# Patient Record
Sex: FEMALE | Race: WHITE | NOT HISPANIC OR LATINO | Employment: FULL TIME | URBAN - METROPOLITAN AREA
[De-identification: names, ages, dates, MRNs, and addresses within clinical notes are randomized per-mention and may not be internally consistent; named-entity substitution may affect disease eponyms.]

---

## 2017-06-08 ENCOUNTER — OFFICE VISIT - HEALTHEAST (OUTPATIENT)
Dept: FAMILY MEDICINE | Facility: CLINIC | Age: 52
End: 2017-06-08

## 2017-06-08 DIAGNOSIS — Z12.31 VISIT FOR SCREENING MAMMOGRAM: ICD-10-CM

## 2017-06-08 DIAGNOSIS — Z00.00 ROUTINE GENERAL MEDICAL EXAMINATION AT A HEALTH CARE FACILITY: ICD-10-CM

## 2017-06-08 DIAGNOSIS — M25.562 CHRONIC ARTHRALGIAS OF KNEES AND HIPS: ICD-10-CM

## 2017-06-08 DIAGNOSIS — E11.9 TYPE 2 DIABETES MELLITUS WITHOUT COMPLICATION, WITHOUT LONG-TERM CURRENT USE OF INSULIN (H): ICD-10-CM

## 2017-06-08 DIAGNOSIS — G89.29 CHRONIC ARTHRALGIAS OF KNEES AND HIPS: ICD-10-CM

## 2017-06-08 DIAGNOSIS — M25.552 CHRONIC ARTHRALGIAS OF KNEES AND HIPS: ICD-10-CM

## 2017-06-08 DIAGNOSIS — E78.2 MIXED HYPERLIPIDEMIA: ICD-10-CM

## 2017-06-08 DIAGNOSIS — M25.561 CHRONIC ARTHRALGIAS OF KNEES AND HIPS: ICD-10-CM

## 2017-06-08 DIAGNOSIS — I10 ESSENTIAL HYPERTENSION, BENIGN: ICD-10-CM

## 2017-06-08 DIAGNOSIS — E66.9 OBESITY, UNSPECIFIED: ICD-10-CM

## 2017-06-08 DIAGNOSIS — M25.551 CHRONIC ARTHRALGIAS OF KNEES AND HIPS: ICD-10-CM

## 2017-06-08 DIAGNOSIS — E55.9 VITAMIN D DEFICIENCY: ICD-10-CM

## 2017-06-08 LAB
CHOLEST SERPL-MCNC: 137 MG/DL
FASTING STATUS PATIENT QL REPORTED: YES
HBA1C MFR BLD: 6.9 % (ref 3.5–6)
HDLC SERPL-MCNC: 40 MG/DL
LDLC SERPL CALC-MCNC: 81 MG/DL
TRIGL SERPL-MCNC: 79 MG/DL

## 2017-06-08 ASSESSMENT — MIFFLIN-ST. JEOR: SCORE: 1695.67

## 2017-06-09 ENCOUNTER — COMMUNICATION - HEALTHEAST (OUTPATIENT)
Dept: FAMILY MEDICINE | Facility: CLINIC | Age: 52
End: 2017-06-09

## 2017-06-23 ENCOUNTER — RECORDS - HEALTHEAST (OUTPATIENT)
Dept: ADMINISTRATIVE | Facility: OTHER | Age: 52
End: 2017-06-23

## 2017-06-28 ENCOUNTER — HOSPITAL ENCOUNTER (OUTPATIENT)
Dept: MAMMOGRAPHY | Facility: HOSPITAL | Age: 52
Discharge: HOME | End: 2017-06-28
Attending: FAMILY MEDICINE

## 2017-06-28 DIAGNOSIS — Z00.00 ROUTINE GENERAL MEDICAL EXAMINATION AT A HEALTH CARE FACILITY: ICD-10-CM

## 2017-06-28 DIAGNOSIS — Z12.31 VISIT FOR SCREENING MAMMOGRAM: ICD-10-CM

## 2017-09-13 ENCOUNTER — COMMUNICATION - HEALTHEAST (OUTPATIENT)
Dept: FAMILY MEDICINE | Facility: CLINIC | Age: 52
End: 2017-09-13

## 2017-09-13 ENCOUNTER — OFFICE VISIT - HEALTHEAST (OUTPATIENT)
Dept: FAMILY MEDICINE | Facility: CLINIC | Age: 52
End: 2017-09-13

## 2017-09-13 DIAGNOSIS — E78.2 MIXED HYPERLIPIDEMIA: ICD-10-CM

## 2017-09-13 DIAGNOSIS — R23.2 HOT FLASHES: ICD-10-CM

## 2017-09-13 DIAGNOSIS — I10 ESSENTIAL HYPERTENSION, BENIGN: ICD-10-CM

## 2017-09-13 DIAGNOSIS — M67.921 BICEPS TENDINOPATHY, RIGHT: ICD-10-CM

## 2017-09-13 DIAGNOSIS — E11.9 TYPE 2 DIABETES MELLITUS WITHOUT COMPLICATION, WITHOUT LONG-TERM CURRENT USE OF INSULIN (H): ICD-10-CM

## 2017-09-13 LAB — HBA1C MFR BLD: 7.4 % (ref 3.5–6)

## 2017-09-13 RX ORDER — LORATADINE 10 MG/1
10 TABLET ORAL DAILY
Status: SHIPPED | COMMUNITY
Start: 2017-04-01

## 2017-09-14 ENCOUNTER — COMMUNICATION - HEALTHEAST (OUTPATIENT)
Dept: FAMILY MEDICINE | Facility: CLINIC | Age: 52
End: 2017-09-14

## 2017-09-14 ENCOUNTER — RECORDS - HEALTHEAST (OUTPATIENT)
Dept: ADMINISTRATIVE | Facility: OTHER | Age: 52
End: 2017-09-14

## 2017-10-04 ENCOUNTER — COMMUNICATION - HEALTHEAST (OUTPATIENT)
Dept: FAMILY MEDICINE | Facility: CLINIC | Age: 52
End: 2017-10-04

## 2017-10-05 ENCOUNTER — COMMUNICATION - HEALTHEAST (OUTPATIENT)
Dept: FAMILY MEDICINE | Facility: CLINIC | Age: 52
End: 2017-10-05

## 2017-11-04 ENCOUNTER — COMMUNICATION - HEALTHEAST (OUTPATIENT)
Dept: FAMILY MEDICINE | Facility: CLINIC | Age: 52
End: 2017-11-04

## 2017-11-04 DIAGNOSIS — I10 ESSENTIAL HYPERTENSION, BENIGN: ICD-10-CM

## 2017-11-14 ENCOUNTER — COMMUNICATION - HEALTHEAST (OUTPATIENT)
Dept: FAMILY MEDICINE | Facility: CLINIC | Age: 52
End: 2017-11-14

## 2017-12-06 ENCOUNTER — OFFICE VISIT - HEALTHEAST (OUTPATIENT)
Dept: FAMILY MEDICINE | Facility: CLINIC | Age: 52
End: 2017-12-06

## 2017-12-06 DIAGNOSIS — E78.2 MIXED HYPERLIPIDEMIA: ICD-10-CM

## 2017-12-06 DIAGNOSIS — E11.9 TYPE 2 DIABETES MELLITUS WITHOUT COMPLICATION, WITHOUT LONG-TERM CURRENT USE OF INSULIN (H): ICD-10-CM

## 2017-12-06 DIAGNOSIS — I10 ESSENTIAL HYPERTENSION, BENIGN: ICD-10-CM

## 2017-12-18 ENCOUNTER — COMMUNICATION - HEALTHEAST (OUTPATIENT)
Dept: FAMILY MEDICINE | Facility: CLINIC | Age: 52
End: 2017-12-18

## 2017-12-18 DIAGNOSIS — K21.9 GERD (GASTROESOPHAGEAL REFLUX DISEASE): ICD-10-CM

## 2018-02-05 ENCOUNTER — COMMUNICATION - HEALTHEAST (OUTPATIENT)
Dept: FAMILY MEDICINE | Facility: CLINIC | Age: 53
End: 2018-02-05

## 2018-02-05 DIAGNOSIS — I10 ESSENTIAL HYPERTENSION, BENIGN: ICD-10-CM

## 2018-03-06 ENCOUNTER — OFFICE VISIT - HEALTHEAST (OUTPATIENT)
Dept: FAMILY MEDICINE | Facility: CLINIC | Age: 53
End: 2018-03-06

## 2018-03-06 DIAGNOSIS — E11.9 TYPE 2 DIABETES MELLITUS WITHOUT COMPLICATION, WITHOUT LONG-TERM CURRENT USE OF INSULIN (H): ICD-10-CM

## 2018-03-06 DIAGNOSIS — E78.2 MIXED HYPERLIPIDEMIA: ICD-10-CM

## 2018-03-06 DIAGNOSIS — I10 ESSENTIAL HYPERTENSION, BENIGN: ICD-10-CM

## 2018-03-06 LAB
ANION GAP SERPL CALCULATED.3IONS-SCNC: 9 MMOL/L (ref 5–18)
BUN SERPL-MCNC: 15 MG/DL (ref 8–22)
CALCIUM SERPL-MCNC: 9.8 MG/DL (ref 8.5–10.5)
CHLORIDE BLD-SCNC: 105 MMOL/L (ref 98–107)
CO2 SERPL-SCNC: 29 MMOL/L (ref 22–31)
CREAT SERPL-MCNC: 0.77 MG/DL (ref 0.6–1.1)
GFR SERPL CREATININE-BSD FRML MDRD: >60 ML/MIN/1.73M2
GLUCOSE BLD-MCNC: 104 MG/DL (ref 70–125)
HBA1C MFR BLD: 6.9 % (ref 3.5–6)
POTASSIUM BLD-SCNC: 4.3 MMOL/L (ref 3.5–5)
SODIUM SERPL-SCNC: 143 MMOL/L (ref 136–145)

## 2018-04-30 ENCOUNTER — COMMUNICATION - HEALTHEAST (OUTPATIENT)
Dept: FAMILY MEDICINE | Facility: CLINIC | Age: 53
End: 2018-04-30

## 2018-04-30 DIAGNOSIS — E11.9 TYPE 2 DIABETES MELLITUS WITHOUT COMPLICATION, WITHOUT LONG-TERM CURRENT USE OF INSULIN (H): ICD-10-CM

## 2018-04-30 DIAGNOSIS — L30.9 DERMATITIS: ICD-10-CM

## 2018-05-24 ENCOUNTER — RECORDS - HEALTHEAST (OUTPATIENT)
Dept: ADMINISTRATIVE | Facility: OTHER | Age: 53
End: 2018-05-24

## 2018-06-11 ENCOUNTER — OFFICE VISIT - HEALTHEAST (OUTPATIENT)
Dept: FAMILY MEDICINE | Facility: CLINIC | Age: 53
End: 2018-06-11

## 2018-06-11 DIAGNOSIS — E11.9 TYPE 2 DIABETES MELLITUS WITHOUT COMPLICATION, WITHOUT LONG-TERM CURRENT USE OF INSULIN (H): ICD-10-CM

## 2018-06-11 DIAGNOSIS — R06.83 SNORING: ICD-10-CM

## 2018-06-11 DIAGNOSIS — E66.9 OBESITY: ICD-10-CM

## 2018-06-11 DIAGNOSIS — E78.2 MIXED HYPERLIPIDEMIA: ICD-10-CM

## 2018-06-11 DIAGNOSIS — Z00.00 ROUTINE GENERAL MEDICAL EXAMINATION AT A HEALTH CARE FACILITY: ICD-10-CM

## 2018-06-11 DIAGNOSIS — I10 ESSENTIAL HYPERTENSION, BENIGN: ICD-10-CM

## 2018-06-11 LAB
ALBUMIN SERPL-MCNC: 3.7 G/DL (ref 3.5–5)
ALP SERPL-CCNC: 79 U/L (ref 45–120)
ALT SERPL W P-5'-P-CCNC: 21 U/L (ref 0–45)
ANION GAP SERPL CALCULATED.3IONS-SCNC: 8 MMOL/L (ref 5–18)
AST SERPL W P-5'-P-CCNC: 17 U/L (ref 0–40)
BILIRUB SERPL-MCNC: 0.5 MG/DL (ref 0–1)
BUN SERPL-MCNC: 17 MG/DL (ref 8–22)
CALCIUM SERPL-MCNC: 9.5 MG/DL (ref 8.5–10.5)
CHLORIDE BLD-SCNC: 106 MMOL/L (ref 98–107)
CHOLEST SERPL-MCNC: 165 MG/DL
CO2 SERPL-SCNC: 27 MMOL/L (ref 22–31)
CREAT SERPL-MCNC: 0.79 MG/DL (ref 0.6–1.1)
FASTING STATUS PATIENT QL REPORTED: YES
GFR SERPL CREATININE-BSD FRML MDRD: >60 ML/MIN/1.73M2
GLUCOSE BLD-MCNC: 114 MG/DL (ref 70–125)
HBA1C MFR BLD: 6.3 % (ref 3.5–6)
HDLC SERPL-MCNC: 40 MG/DL
LDLC SERPL CALC-MCNC: 109 MG/DL
POTASSIUM BLD-SCNC: 4.2 MMOL/L (ref 3.5–5)
PROT SERPL-MCNC: 6.7 G/DL (ref 6–8)
SODIUM SERPL-SCNC: 141 MMOL/L (ref 136–145)
TRIGL SERPL-MCNC: 78 MG/DL

## 2018-06-11 ASSESSMENT — MIFFLIN-ST. JEOR: SCORE: 1690.38

## 2018-06-12 LAB
HBV SURFACE AB SERPL IA-ACNC: NEGATIVE M[IU]/ML
HBV SURFACE AG SERPL QL IA: NEGATIVE
HCV AB SERPL QL IA: NEGATIVE

## 2018-06-15 ENCOUNTER — AMBULATORY - HEALTHEAST (OUTPATIENT)
Dept: FAMILY MEDICINE | Facility: CLINIC | Age: 53
End: 2018-06-15

## 2018-06-15 ENCOUNTER — COMMUNICATION - HEALTHEAST (OUTPATIENT)
Dept: FAMILY MEDICINE | Facility: CLINIC | Age: 53
End: 2018-06-15

## 2018-07-02 ENCOUNTER — HOSPITAL ENCOUNTER (OUTPATIENT)
Dept: MAMMOGRAPHY | Facility: CLINIC | Age: 53
Discharge: HOME | End: 2018-07-02
Attending: FAMILY MEDICINE

## 2018-07-02 DIAGNOSIS — Z12.31 VISIT FOR SCREENING MAMMOGRAM: ICD-10-CM

## 2018-07-24 ENCOUNTER — OFFICE VISIT - HEALTHEAST (OUTPATIENT)
Dept: SLEEP MEDICINE | Facility: CLINIC | Age: 53
End: 2018-07-24

## 2018-07-24 DIAGNOSIS — G47.10 HYPERSOMNIA: ICD-10-CM

## 2018-07-24 DIAGNOSIS — G47.8 SLEEP DYSFUNCTION WITH SLEEP STAGE DISTURBANCE: ICD-10-CM

## 2018-07-24 DIAGNOSIS — R03.0 ELEVATED BLOOD PRESSURE READING: ICD-10-CM

## 2018-07-24 DIAGNOSIS — R06.83 SNORING: ICD-10-CM

## 2018-07-24 ASSESSMENT — MIFFLIN-ST. JEOR: SCORE: 1706.26

## 2018-08-05 ENCOUNTER — COMMUNICATION - HEALTHEAST (OUTPATIENT)
Dept: FAMILY MEDICINE | Facility: CLINIC | Age: 53
End: 2018-08-05

## 2018-08-05 DIAGNOSIS — E11.9 TYPE 2 DIABETES MELLITUS WITHOUT COMPLICATION, WITHOUT LONG-TERM CURRENT USE OF INSULIN (H): ICD-10-CM

## 2018-08-06 ENCOUNTER — COMMUNICATION - HEALTHEAST (OUTPATIENT)
Dept: FAMILY MEDICINE | Facility: CLINIC | Age: 53
End: 2018-08-06

## 2018-08-17 ENCOUNTER — COMMUNICATION - HEALTHEAST (OUTPATIENT)
Dept: SLEEP MEDICINE | Facility: CLINIC | Age: 53
End: 2018-08-17

## 2018-08-17 DIAGNOSIS — R06.83 SNORING: ICD-10-CM

## 2018-08-17 DIAGNOSIS — G47.10 HYPERSOMNIA: ICD-10-CM

## 2018-09-15 ENCOUNTER — COMMUNICATION - HEALTHEAST (OUTPATIENT)
Dept: FAMILY MEDICINE | Facility: CLINIC | Age: 53
End: 2018-09-15

## 2018-09-15 DIAGNOSIS — E78.2 MIXED HYPERLIPIDEMIA: ICD-10-CM

## 2018-09-15 DIAGNOSIS — E11.9 TYPE 2 DIABETES MELLITUS WITHOUT COMPLICATION, WITHOUT LONG-TERM CURRENT USE OF INSULIN (H): ICD-10-CM

## 2018-10-02 ENCOUNTER — RECORDS - HEALTHEAST (OUTPATIENT)
Dept: ADMINISTRATIVE | Facility: OTHER | Age: 53
End: 2018-10-02

## 2018-10-02 ENCOUNTER — RECORDS - HEALTHEAST (OUTPATIENT)
Dept: SLEEP MEDICINE | Facility: CLINIC | Age: 53
End: 2018-10-02

## 2018-10-02 DIAGNOSIS — G47.10 HYPERSOMNIA, UNSPECIFIED: ICD-10-CM

## 2018-10-02 DIAGNOSIS — R06.83 SNORING: ICD-10-CM

## 2018-10-05 ENCOUNTER — COMMUNICATION - HEALTHEAST (OUTPATIENT)
Dept: SLEEP MEDICINE | Facility: CLINIC | Age: 53
End: 2018-10-05

## 2018-10-05 DIAGNOSIS — G47.33 OSA (OBSTRUCTIVE SLEEP APNEA): ICD-10-CM

## 2018-10-08 ENCOUNTER — COMMUNICATION - HEALTHEAST (OUTPATIENT)
Dept: SLEEP MEDICINE | Facility: CLINIC | Age: 53
End: 2018-10-08

## 2018-10-08 ENCOUNTER — AMBULATORY - HEALTHEAST (OUTPATIENT)
Dept: SLEEP MEDICINE | Facility: CLINIC | Age: 53
End: 2018-10-08

## 2018-10-09 ENCOUNTER — AMBULATORY - HEALTHEAST (OUTPATIENT)
Dept: SLEEP MEDICINE | Facility: CLINIC | Age: 53
End: 2018-10-09

## 2018-10-11 ENCOUNTER — OFFICE VISIT - HEALTHEAST (OUTPATIENT)
Dept: FAMILY MEDICINE | Facility: CLINIC | Age: 53
End: 2018-10-11

## 2018-10-11 DIAGNOSIS — E66.01 MORBID OBESITY (H): ICD-10-CM

## 2018-10-11 DIAGNOSIS — I10 ESSENTIAL HYPERTENSION, BENIGN: ICD-10-CM

## 2018-10-11 DIAGNOSIS — E11.9 TYPE 2 DIABETES MELLITUS WITHOUT COMPLICATION, WITHOUT LONG-TERM CURRENT USE OF INSULIN (H): ICD-10-CM

## 2018-10-11 DIAGNOSIS — G47.33 OSA (OBSTRUCTIVE SLEEP APNEA): ICD-10-CM

## 2018-10-11 DIAGNOSIS — Z71.85 VACCINE COUNSELING: ICD-10-CM

## 2018-10-11 LAB
ANION GAP SERPL CALCULATED.3IONS-SCNC: 11 MMOL/L (ref 5–18)
BUN SERPL-MCNC: 12 MG/DL (ref 8–22)
CALCIUM SERPL-MCNC: 9.7 MG/DL (ref 8.5–10.5)
CHLORIDE BLD-SCNC: 105 MMOL/L (ref 98–107)
CO2 SERPL-SCNC: 27 MMOL/L (ref 22–31)
CREAT SERPL-MCNC: 0.79 MG/DL (ref 0.6–1.1)
CREAT UR-MCNC: 89.5 MG/DL
GFR SERPL CREATININE-BSD FRML MDRD: >60 ML/MIN/1.73M2
GLUCOSE BLD-MCNC: 108 MG/DL (ref 70–125)
HBA1C MFR BLD: 7.1 % (ref 3.5–6)
MICROALBUMIN UR-MCNC: 0.5 MG/DL (ref 0–1.99)
MICROALBUMIN/CREAT UR: 5.6 MG/G
POTASSIUM BLD-SCNC: 4.3 MMOL/L (ref 3.5–5)
SODIUM SERPL-SCNC: 143 MMOL/L (ref 136–145)

## 2018-10-19 ENCOUNTER — AMBULATORY - HEALTHEAST (OUTPATIENT)
Dept: SLEEP MEDICINE | Facility: CLINIC | Age: 53
End: 2018-10-19

## 2018-11-14 ENCOUNTER — COMMUNICATION - HEALTHEAST (OUTPATIENT)
Dept: FAMILY MEDICINE | Facility: CLINIC | Age: 53
End: 2018-11-14

## 2018-12-03 ENCOUNTER — COMMUNICATION - HEALTHEAST (OUTPATIENT)
Dept: FAMILY MEDICINE | Facility: CLINIC | Age: 53
End: 2018-12-03

## 2018-12-03 ENCOUNTER — OFFICE VISIT - HEALTHEAST (OUTPATIENT)
Dept: SLEEP MEDICINE | Facility: CLINIC | Age: 53
End: 2018-12-03

## 2018-12-03 DIAGNOSIS — L30.9 DERMATITIS: ICD-10-CM

## 2018-12-03 DIAGNOSIS — G47.8 SLEEP DYSFUNCTION WITH SLEEP STAGE DISTURBANCE: ICD-10-CM

## 2018-12-03 DIAGNOSIS — G47.33 OSA ON CPAP: ICD-10-CM

## 2018-12-17 ENCOUNTER — COMMUNICATION - HEALTHEAST (OUTPATIENT)
Dept: FAMILY MEDICINE | Facility: CLINIC | Age: 53
End: 2018-12-17

## 2018-12-17 DIAGNOSIS — K21.9 GERD (GASTROESOPHAGEAL REFLUX DISEASE): ICD-10-CM

## 2019-01-11 ENCOUNTER — OFFICE VISIT - HEALTHEAST (OUTPATIENT)
Dept: FAMILY MEDICINE | Facility: CLINIC | Age: 54
End: 2019-01-11

## 2019-01-11 ENCOUNTER — COMMUNICATION - HEALTHEAST (OUTPATIENT)
Dept: FAMILY MEDICINE | Facility: CLINIC | Age: 54
End: 2019-01-11

## 2019-01-11 ENCOUNTER — RECORDS - HEALTHEAST (OUTPATIENT)
Dept: ADMINISTRATIVE | Facility: OTHER | Age: 54
End: 2019-01-11

## 2019-01-11 DIAGNOSIS — I10 ESSENTIAL HYPERTENSION, BENIGN: ICD-10-CM

## 2019-01-11 DIAGNOSIS — E11.9 TYPE 2 DIABETES MELLITUS WITHOUT COMPLICATION, WITHOUT LONG-TERM CURRENT USE OF INSULIN (H): ICD-10-CM

## 2019-01-11 DIAGNOSIS — Z71.85 VACCINE COUNSELING: ICD-10-CM

## 2019-01-11 LAB
ANION GAP SERPL CALCULATED.3IONS-SCNC: 7 MMOL/L (ref 5–18)
BUN SERPL-MCNC: 16 MG/DL (ref 8–22)
CALCIUM SERPL-MCNC: 9.6 MG/DL (ref 8.5–10.5)
CHLORIDE BLD-SCNC: 107 MMOL/L (ref 98–107)
CO2 SERPL-SCNC: 28 MMOL/L (ref 22–31)
CREAT SERPL-MCNC: 0.78 MG/DL (ref 0.6–1.1)
GFR SERPL CREATININE-BSD FRML MDRD: >60 ML/MIN/1.73M2
GLUCOSE BLD-MCNC: 108 MG/DL (ref 70–125)
HBA1C MFR BLD: 6.6 % (ref 3.5–6)
POTASSIUM BLD-SCNC: 4.4 MMOL/L (ref 3.5–5)
RETINOPATHY: NEGATIVE
SODIUM SERPL-SCNC: 142 MMOL/L (ref 136–145)

## 2019-05-06 ENCOUNTER — OFFICE VISIT - HEALTHEAST (OUTPATIENT)
Dept: FAMILY MEDICINE | Facility: CLINIC | Age: 54
End: 2019-05-06

## 2019-05-06 DIAGNOSIS — Z82.49 FAMILY HISTORY OF ISCHEMIC HEART DISEASE: ICD-10-CM

## 2019-05-06 DIAGNOSIS — I10 ESSENTIAL HYPERTENSION, BENIGN: ICD-10-CM

## 2019-05-06 DIAGNOSIS — E66.01 MORBID OBESITY (H): ICD-10-CM

## 2019-05-06 DIAGNOSIS — Z00.00 ROUTINE GENERAL MEDICAL EXAMINATION AT A HEALTH CARE FACILITY: ICD-10-CM

## 2019-05-06 DIAGNOSIS — E11.9 TYPE 2 DIABETES MELLITUS WITHOUT COMPLICATION, WITHOUT LONG-TERM CURRENT USE OF INSULIN (H): ICD-10-CM

## 2019-05-06 LAB
ALBUMIN SERPL-MCNC: 4.2 G/DL (ref 3.5–5)
ALP SERPL-CCNC: 80 U/L (ref 45–120)
ALT SERPL W P-5'-P-CCNC: 21 U/L (ref 0–45)
ANION GAP SERPL CALCULATED.3IONS-SCNC: 12 MMOL/L (ref 5–18)
AST SERPL W P-5'-P-CCNC: 16 U/L (ref 0–40)
BILIRUB SERPL-MCNC: 0.7 MG/DL (ref 0–1)
BUN SERPL-MCNC: 15 MG/DL (ref 8–22)
CALCIUM SERPL-MCNC: 10.2 MG/DL (ref 8.5–10.5)
CHLORIDE BLD-SCNC: 105 MMOL/L (ref 98–107)
CHOLEST SERPL-MCNC: 167 MG/DL
CO2 SERPL-SCNC: 26 MMOL/L (ref 22–31)
CREAT SERPL-MCNC: 0.77 MG/DL (ref 0.6–1.1)
FASTING STATUS PATIENT QL REPORTED: YES
GFR SERPL CREATININE-BSD FRML MDRD: >60 ML/MIN/1.73M2
GLUCOSE BLD-MCNC: 99 MG/DL (ref 70–125)
HBA1C MFR BLD: 6 % (ref 3.5–6)
HDLC SERPL-MCNC: 43 MG/DL
LDLC SERPL CALC-MCNC: 104 MG/DL
POTASSIUM BLD-SCNC: 4 MMOL/L (ref 3.5–5)
PROT SERPL-MCNC: 7.3 G/DL (ref 6–8)
SODIUM SERPL-SCNC: 143 MMOL/L (ref 136–145)
TRIGL SERPL-MCNC: 101 MG/DL

## 2019-05-06 ASSESSMENT — MIFFLIN-ST. JEOR: SCORE: 1609.27

## 2019-05-14 ENCOUNTER — COMMUNICATION - HEALTHEAST (OUTPATIENT)
Dept: FAMILY MEDICINE | Facility: CLINIC | Age: 54
End: 2019-05-14

## 2019-06-07 ENCOUNTER — COMMUNICATION - HEALTHEAST (OUTPATIENT)
Dept: FAMILY MEDICINE | Facility: CLINIC | Age: 54
End: 2019-06-07

## 2019-06-07 DIAGNOSIS — I10 ESSENTIAL HYPERTENSION, BENIGN: ICD-10-CM

## 2019-06-28 ENCOUNTER — OFFICE VISIT - HEALTHEAST (OUTPATIENT)
Dept: CARDIOLOGY | Facility: CLINIC | Age: 54
End: 2019-06-28

## 2019-06-28 DIAGNOSIS — E66.01 MORBID OBESITY (H): ICD-10-CM

## 2019-06-28 DIAGNOSIS — Z82.49 FAMILY HISTORY OF ATHEROSCLEROSIS: ICD-10-CM

## 2019-06-28 DIAGNOSIS — E11.9 TYPE 2 DIABETES MELLITUS WITHOUT COMPLICATION, WITHOUT LONG-TERM CURRENT USE OF INSULIN (H): ICD-10-CM

## 2019-06-28 DIAGNOSIS — I10 ESSENTIAL HYPERTENSION, BENIGN: ICD-10-CM

## 2019-06-28 DIAGNOSIS — G47.33 OSA (OBSTRUCTIVE SLEEP APNEA): ICD-10-CM

## 2019-06-28 DIAGNOSIS — E78.2 MIXED HYPERLIPIDEMIA: ICD-10-CM

## 2019-06-28 ASSESSMENT — MIFFLIN-ST. JEOR: SCORE: 1626.88

## 2019-07-02 ENCOUNTER — AMBULATORY - HEALTHEAST (OUTPATIENT)
Dept: CARDIOLOGY | Facility: CLINIC | Age: 54
End: 2019-07-02

## 2019-07-02 DIAGNOSIS — I10 ESSENTIAL HYPERTENSION, BENIGN: ICD-10-CM

## 2019-07-02 DIAGNOSIS — E11.9 TYPE 2 DIABETES MELLITUS WITHOUT COMPLICATION, WITHOUT LONG-TERM CURRENT USE OF INSULIN (H): ICD-10-CM

## 2019-07-02 DIAGNOSIS — E78.2 MIXED HYPERLIPIDEMIA: ICD-10-CM

## 2019-07-12 ENCOUNTER — HOSPITAL ENCOUNTER (OUTPATIENT)
Dept: CT IMAGING | Facility: CLINIC | Age: 54
Discharge: HOME | End: 2019-07-12
Attending: INTERNAL MEDICINE

## 2019-07-12 DIAGNOSIS — E11.9 TYPE 2 DIABETES MELLITUS WITHOUT COMPLICATION, WITHOUT LONG-TERM CURRENT USE OF INSULIN (H): ICD-10-CM

## 2019-07-12 DIAGNOSIS — E78.2 MIXED HYPERLIPIDEMIA: ICD-10-CM

## 2019-07-12 DIAGNOSIS — I10 ESSENTIAL HYPERTENSION, BENIGN: ICD-10-CM

## 2019-07-12 LAB
CV CALCIUM SCORE AGATSTON LM: 0
CV CALCIUM SCORING AGATSON LAD: 13
CV CALCIUM SCORING AGATSTON CX: 0
CV CALCIUM SCORING AGATSTON RCA: 0
CV CALCIUM SCORING AGATSTON TOTAL: 13

## 2019-08-07 ENCOUNTER — HOSPITAL ENCOUNTER (OUTPATIENT)
Dept: MAMMOGRAPHY | Facility: CLINIC | Age: 54
Discharge: HOME | End: 2019-08-07
Attending: FAMILY MEDICINE

## 2019-08-07 DIAGNOSIS — Z12.31 VISIT FOR SCREENING MAMMOGRAM: ICD-10-CM

## 2019-08-15 ENCOUNTER — COMMUNICATION - HEALTHEAST (OUTPATIENT)
Dept: FAMILY MEDICINE | Facility: CLINIC | Age: 54
End: 2019-08-15

## 2019-08-15 DIAGNOSIS — E11.9 TYPE 2 DIABETES MELLITUS WITHOUT COMPLICATION, WITHOUT LONG-TERM CURRENT USE OF INSULIN (H): ICD-10-CM

## 2019-09-12 ENCOUNTER — COMMUNICATION - HEALTHEAST (OUTPATIENT)
Dept: FAMILY MEDICINE | Facility: CLINIC | Age: 54
End: 2019-09-12

## 2019-09-12 DIAGNOSIS — I10 ESSENTIAL HYPERTENSION, BENIGN: ICD-10-CM

## 2019-09-18 ENCOUNTER — COMMUNICATION - HEALTHEAST (OUTPATIENT)
Dept: FAMILY MEDICINE | Facility: CLINIC | Age: 54
End: 2019-09-18

## 2019-09-18 DIAGNOSIS — K21.9 GERD (GASTROESOPHAGEAL REFLUX DISEASE): ICD-10-CM

## 2019-09-18 DIAGNOSIS — E78.2 MIXED HYPERLIPIDEMIA: ICD-10-CM

## 2019-09-18 DIAGNOSIS — E11.9 TYPE 2 DIABETES MELLITUS WITHOUT COMPLICATION, WITHOUT LONG-TERM CURRENT USE OF INSULIN (H): ICD-10-CM

## 2019-10-10 ENCOUNTER — RECORDS - HEALTHEAST (OUTPATIENT)
Dept: ADMINISTRATIVE | Facility: OTHER | Age: 54
End: 2019-10-10

## 2019-10-17 RX ORDER — ACETAMINOPHEN 500 MG
TABLET ORAL
Status: SHIPPED | COMMUNITY
Start: 2019-10-11

## 2019-10-21 ENCOUNTER — OFFICE VISIT - HEALTHEAST (OUTPATIENT)
Dept: FAMILY MEDICINE | Facility: CLINIC | Age: 54
End: 2019-10-21

## 2019-10-21 DIAGNOSIS — I10 ESSENTIAL HYPERTENSION, BENIGN: ICD-10-CM

## 2019-10-21 DIAGNOSIS — Z71.85 VACCINE COUNSELING: ICD-10-CM

## 2019-10-21 DIAGNOSIS — Z23 NEED FOR INFLUENZA VACCINATION: ICD-10-CM

## 2019-10-21 DIAGNOSIS — M54.50 ACUTE LEFT-SIDED LOW BACK PAIN WITHOUT SCIATICA: ICD-10-CM

## 2019-10-21 DIAGNOSIS — E11.9 TYPE 2 DIABETES MELLITUS WITHOUT COMPLICATION, WITHOUT LONG-TERM CURRENT USE OF INSULIN (H): ICD-10-CM

## 2019-10-21 LAB
CREAT UR-MCNC: 82.7 MG/DL
HBA1C MFR BLD: 6.1 % (ref 3.5–6)
MICROALBUMIN UR-MCNC: <0.5 MG/DL (ref 0–1.99)
MICROALBUMIN/CREAT UR: NORMAL MG/G{CREAT}

## 2019-10-30 ENCOUNTER — COMMUNICATION - HEALTHEAST (OUTPATIENT)
Dept: FAMILY MEDICINE | Facility: CLINIC | Age: 54
End: 2019-10-30

## 2019-10-30 ENCOUNTER — COMMUNICATION - HEALTHEAST (OUTPATIENT)
Dept: SCHEDULING | Facility: CLINIC | Age: 54
End: 2019-10-30

## 2019-10-30 DIAGNOSIS — D22.9 CHANGE IN MOLE: ICD-10-CM

## 2019-11-07 ENCOUNTER — COMMUNICATION - HEALTHEAST (OUTPATIENT)
Dept: FAMILY MEDICINE | Facility: CLINIC | Age: 54
End: 2019-11-07

## 2019-11-12 ENCOUNTER — RECORDS - HEALTHEAST (OUTPATIENT)
Dept: ADMINISTRATIVE | Facility: OTHER | Age: 54
End: 2019-11-12

## 2019-12-11 ENCOUNTER — AMBULATORY - HEALTHEAST (OUTPATIENT)
Dept: FAMILY MEDICINE | Facility: CLINIC | Age: 54
End: 2019-12-11

## 2019-12-20 ENCOUNTER — AMBULATORY - HEALTHEAST (OUTPATIENT)
Dept: NURSING | Facility: CLINIC | Age: 54
End: 2019-12-20

## 2019-12-20 DIAGNOSIS — Z23 NEED FOR VACCINATION: ICD-10-CM

## 2020-01-07 ENCOUNTER — OFFICE VISIT - HEALTHEAST (OUTPATIENT)
Dept: FAMILY MEDICINE | Facility: CLINIC | Age: 55
End: 2020-01-07

## 2020-01-07 DIAGNOSIS — H66.003 NON-RECURRENT ACUTE SUPPURATIVE OTITIS MEDIA OF BOTH EARS WITHOUT SPONTANEOUS RUPTURE OF TYMPANIC MEMBRANES: ICD-10-CM

## 2020-01-07 DIAGNOSIS — H60.502 ACUTE OTITIS EXTERNA OF LEFT EAR, UNSPECIFIED TYPE: ICD-10-CM

## 2020-01-07 DIAGNOSIS — J02.0 STREPTOCOCCAL PHARYNGITIS: ICD-10-CM

## 2020-03-12 ENCOUNTER — COMMUNICATION - HEALTHEAST (OUTPATIENT)
Dept: FAMILY MEDICINE | Facility: CLINIC | Age: 55
End: 2020-03-12

## 2020-03-12 DIAGNOSIS — L30.9 DERMATITIS: ICD-10-CM

## 2020-03-15 RX ORDER — TRIAMCINOLONE ACETONIDE 5 MG/G
CREAM TOPICAL
Qty: 30 G | Refills: 6 | Status: SHIPPED | OUTPATIENT
Start: 2020-03-15 | End: 2022-01-24

## 2020-04-06 ENCOUNTER — AMBULATORY - HEALTHEAST (OUTPATIENT)
Dept: FAMILY MEDICINE | Facility: CLINIC | Age: 55
End: 2020-04-06

## 2020-04-07 ENCOUNTER — RECORDS - HEALTHEAST (OUTPATIENT)
Dept: HEALTH INFORMATION MANAGEMENT | Facility: CLINIC | Age: 55
End: 2020-04-07

## 2020-06-08 ENCOUNTER — COMMUNICATION - HEALTHEAST (OUTPATIENT)
Dept: FAMILY MEDICINE | Facility: CLINIC | Age: 55
End: 2020-06-08

## 2020-06-08 DIAGNOSIS — I10 ESSENTIAL HYPERTENSION, BENIGN: ICD-10-CM

## 2020-06-08 RX ORDER — AMLODIPINE BESYLATE 10 MG/1
TABLET ORAL
Qty: 90 TABLET | Refills: 3 | Status: SHIPPED | OUTPATIENT
Start: 2020-06-08 | End: 2021-12-21

## 2020-06-22 ENCOUNTER — COMMUNICATION - HEALTHEAST (OUTPATIENT)
Dept: FAMILY MEDICINE | Facility: CLINIC | Age: 55
End: 2020-06-22

## 2020-06-22 DIAGNOSIS — I10 ESSENTIAL HYPERTENSION, BENIGN: ICD-10-CM

## 2020-06-29 ENCOUNTER — COMMUNICATION - HEALTHEAST (OUTPATIENT)
Dept: FAMILY MEDICINE | Facility: CLINIC | Age: 55
End: 2020-06-29

## 2020-06-29 DIAGNOSIS — E78.2 MIXED HYPERLIPIDEMIA: ICD-10-CM

## 2020-06-29 DIAGNOSIS — E11.9 TYPE 2 DIABETES MELLITUS WITHOUT COMPLICATION, WITHOUT LONG-TERM CURRENT USE OF INSULIN (H): ICD-10-CM

## 2020-06-29 DIAGNOSIS — K21.9 GERD (GASTROESOPHAGEAL REFLUX DISEASE): ICD-10-CM

## 2020-08-17 ENCOUNTER — HOSPITAL ENCOUNTER (OUTPATIENT)
Dept: MAMMOGRAPHY | Facility: CLINIC | Age: 55
Discharge: HOME | End: 2020-08-17
Attending: FAMILY MEDICINE

## 2020-08-17 DIAGNOSIS — Z12.31 SCREENING MAMMOGRAM, ENCOUNTER FOR: ICD-10-CM

## 2020-09-03 ENCOUNTER — OFFICE VISIT - HEALTHEAST (OUTPATIENT)
Dept: FAMILY MEDICINE | Facility: CLINIC | Age: 55
End: 2020-09-03

## 2020-09-03 DIAGNOSIS — E66.01 MORBID OBESITY (H): ICD-10-CM

## 2020-09-03 DIAGNOSIS — E11.9 TYPE 2 DIABETES MELLITUS WITHOUT COMPLICATION, WITHOUT LONG-TERM CURRENT USE OF INSULIN (H): ICD-10-CM

## 2020-09-03 DIAGNOSIS — R12 HEARTBURN: ICD-10-CM

## 2020-09-03 DIAGNOSIS — Z00.00 ROUTINE GENERAL MEDICAL EXAMINATION AT A HEALTH CARE FACILITY: ICD-10-CM

## 2020-09-03 DIAGNOSIS — I10 ESSENTIAL HYPERTENSION, BENIGN: ICD-10-CM

## 2020-09-03 DIAGNOSIS — E78.2 MIXED HYPERLIPIDEMIA: ICD-10-CM

## 2020-09-03 LAB
ALBUMIN SERPL-MCNC: 3.8 G/DL (ref 3.5–5)
ALBUMIN UR-MCNC: NEGATIVE MG/DL
ALP SERPL-CCNC: 81 U/L (ref 45–120)
ALT SERPL W P-5'-P-CCNC: 21 U/L (ref 0–45)
ANION GAP SERPL CALCULATED.3IONS-SCNC: 9 MMOL/L (ref 5–18)
APPEARANCE UR: CLEAR
AST SERPL W P-5'-P-CCNC: 17 U/L (ref 0–40)
BACTERIA #/AREA URNS HPF: ABNORMAL HPF
BILIRUB SERPL-MCNC: 0.5 MG/DL (ref 0–1)
BILIRUB UR QL STRIP: NEGATIVE
BUN SERPL-MCNC: 14 MG/DL (ref 8–22)
CALCIUM SERPL-MCNC: 9.4 MG/DL (ref 8.5–10.5)
CHLORIDE BLD-SCNC: 107 MMOL/L (ref 98–107)
CHOLEST SERPL-MCNC: 171 MG/DL
CO2 SERPL-SCNC: 27 MMOL/L (ref 22–31)
COLOR UR AUTO: YELLOW
CREAT SERPL-MCNC: 0.75 MG/DL (ref 0.6–1.1)
CREAT UR-MCNC: 74.7 MG/DL
ERYTHROCYTE [DISTWIDTH] IN BLOOD BY AUTOMATED COUNT: 13.2 % (ref 11–14.5)
FASTING STATUS PATIENT QL REPORTED: YES
GFR SERPL CREATININE-BSD FRML MDRD: >60 ML/MIN/1.73M2
GLUCOSE BLD-MCNC: 117 MG/DL (ref 70–125)
GLUCOSE UR STRIP-MCNC: NEGATIVE MG/DL
HBA1C MFR BLD: 6.5 %
HCT VFR BLD AUTO: 40 % (ref 35–47)
HDLC SERPL-MCNC: 40 MG/DL
HGB BLD-MCNC: 13.1 G/DL (ref 12–16)
HGB UR QL STRIP: NEGATIVE
KETONES UR STRIP-MCNC: NEGATIVE MG/DL
LDLC SERPL CALC-MCNC: 113 MG/DL
LEUKOCYTE ESTERASE UR QL STRIP: ABNORMAL
MCH RBC QN AUTO: 29.8 PG (ref 27–34)
MCHC RBC AUTO-ENTMCNC: 32.8 G/DL (ref 32–36)
MCV RBC AUTO: 91 FL (ref 80–100)
MICROALBUMIN UR-MCNC: 0.51 MG/DL (ref 0–1.99)
MICROALBUMIN/CREAT UR: 6.8 MG/G
NITRATE UR QL: NEGATIVE
PH UR STRIP: 5.5 [PH] (ref 5–8)
PLATELET # BLD AUTO: 226 THOU/UL (ref 140–440)
PMV BLD AUTO: 13.6 FL (ref 8.5–12.5)
POTASSIUM BLD-SCNC: 4.2 MMOL/L (ref 3.5–5)
PROT SERPL-MCNC: 6.8 G/DL (ref 6–8)
RBC # BLD AUTO: 4.39 MILL/UL (ref 3.8–5.4)
RBC #/AREA URNS AUTO: ABNORMAL HPF
SODIUM SERPL-SCNC: 143 MMOL/L (ref 136–145)
SP GR UR STRIP: 1.02 (ref 1–1.03)
SQUAMOUS #/AREA URNS AUTO: ABNORMAL LPF
TRIGL SERPL-MCNC: 89 MG/DL
UROBILINOGEN UR STRIP-ACNC: ABNORMAL
WBC #/AREA URNS AUTO: ABNORMAL HPF
WBC: 6.2 THOU/UL (ref 4–11)

## 2020-09-03 ASSESSMENT — MIFFLIN-ST. JEOR: SCORE: 1703.99

## 2020-09-04 ENCOUNTER — AMBULATORY - HEALTHEAST (OUTPATIENT)
Dept: FAMILY MEDICINE | Facility: CLINIC | Age: 55
End: 2020-09-04

## 2020-09-04 DIAGNOSIS — E78.2 MIXED HYPERLIPIDEMIA: ICD-10-CM

## 2020-09-04 DIAGNOSIS — E11.9 TYPE 2 DIABETES MELLITUS WITHOUT COMPLICATION, WITHOUT LONG-TERM CURRENT USE OF INSULIN (H): ICD-10-CM

## 2020-09-04 LAB — BACTERIA SPEC CULT: NO GROWTH

## 2020-09-04 RX ORDER — ATORVASTATIN CALCIUM 80 MG/1
80 TABLET, FILM COATED ORAL DAILY
Qty: 90 TABLET | Refills: 4 | Status: SHIPPED | OUTPATIENT
Start: 2020-09-04 | End: 2021-10-05

## 2020-09-21 ENCOUNTER — COMMUNICATION - HEALTHEAST (OUTPATIENT)
Dept: FAMILY MEDICINE | Facility: CLINIC | Age: 55
End: 2020-09-21

## 2020-09-21 DIAGNOSIS — I10 ESSENTIAL HYPERTENSION, BENIGN: ICD-10-CM

## 2020-09-22 RX ORDER — LOSARTAN POTASSIUM AND HYDROCHLOROTHIAZIDE 25; 100 MG/1; MG/1
1 TABLET ORAL DAILY
Qty: 90 TABLET | Refills: 4 | Status: SHIPPED | OUTPATIENT
Start: 2020-09-22 | End: 2021-09-26

## 2021-03-26 ENCOUNTER — AMBULATORY - HEALTHEAST (OUTPATIENT)
Dept: FAMILY MEDICINE | Facility: CLINIC | Age: 56
End: 2021-03-26

## 2021-03-26 ENCOUNTER — RECORDS - HEALTHEAST (OUTPATIENT)
Dept: GENERAL RADIOLOGY | Facility: CLINIC | Age: 56
End: 2021-03-26

## 2021-03-26 ENCOUNTER — OFFICE VISIT - HEALTHEAST (OUTPATIENT)
Dept: FAMILY MEDICINE | Facility: CLINIC | Age: 56
End: 2021-03-26

## 2021-03-26 DIAGNOSIS — M25.552 PAIN IN LEFT HIP: ICD-10-CM

## 2021-03-26 DIAGNOSIS — M16.0 PRIMARY OSTEOARTHRITIS OF BOTH HIPS: ICD-10-CM

## 2021-03-26 DIAGNOSIS — E78.2 MIXED HYPERLIPIDEMIA: ICD-10-CM

## 2021-03-26 DIAGNOSIS — E66.01 MORBID OBESITY (H): ICD-10-CM

## 2021-03-26 DIAGNOSIS — E11.9 TYPE 2 DIABETES MELLITUS WITHOUT COMPLICATION, WITHOUT LONG-TERM CURRENT USE OF INSULIN (H): ICD-10-CM

## 2021-03-26 DIAGNOSIS — M25.552 HIP PAIN, LEFT: ICD-10-CM

## 2021-03-26 DIAGNOSIS — I10 ESSENTIAL HYPERTENSION, BENIGN: ICD-10-CM

## 2021-03-26 LAB
ANION GAP SERPL CALCULATED.3IONS-SCNC: 11 MMOL/L (ref 5–18)
BUN SERPL-MCNC: 13 MG/DL (ref 8–22)
CALCIUM SERPL-MCNC: 9.4 MG/DL (ref 8.5–10.5)
CHLORIDE BLD-SCNC: 105 MMOL/L (ref 98–107)
CHOLEST SERPL-MCNC: 176 MG/DL
CO2 SERPL-SCNC: 27 MMOL/L (ref 22–31)
CREAT SERPL-MCNC: 0.73 MG/DL (ref 0.6–1.1)
FASTING STATUS PATIENT QL REPORTED: YES
GFR SERPL CREATININE-BSD FRML MDRD: >60 ML/MIN/1.73M2
GLUCOSE BLD-MCNC: 118 MG/DL (ref 70–125)
HBA1C MFR BLD: 6.9 %
HDLC SERPL-MCNC: 43 MG/DL
LDLC SERPL CALC-MCNC: 101 MG/DL
POTASSIUM BLD-SCNC: 4.2 MMOL/L (ref 3.5–5)
SODIUM SERPL-SCNC: 143 MMOL/L (ref 136–145)
TRIGL SERPL-MCNC: 161 MG/DL

## 2021-03-31 ENCOUNTER — COMMUNICATION - HEALTHEAST (OUTPATIENT)
Dept: FAMILY MEDICINE | Facility: CLINIC | Age: 56
End: 2021-03-31

## 2021-03-31 DIAGNOSIS — K21.9 GERD (GASTROESOPHAGEAL REFLUX DISEASE): ICD-10-CM

## 2021-04-01 ENCOUNTER — AMBULATORY - HEALTHEAST (OUTPATIENT)
Dept: NURSING | Facility: CLINIC | Age: 56
End: 2021-04-01

## 2021-04-08 ENCOUNTER — RECORDS - HEALTHEAST (OUTPATIENT)
Dept: ADMINISTRATIVE | Facility: OTHER | Age: 56
End: 2021-04-08

## 2021-04-13 ENCOUNTER — OFFICE VISIT - HEALTHEAST (OUTPATIENT)
Dept: PHYSICAL THERAPY | Facility: REHABILITATION | Age: 56
End: 2021-04-13

## 2021-04-13 DIAGNOSIS — M62.81 GENERALIZED MUSCLE WEAKNESS: ICD-10-CM

## 2021-04-13 DIAGNOSIS — M16.0 BILATERAL PRIMARY OSTEOARTHRITIS OF HIP: ICD-10-CM

## 2021-04-22 ENCOUNTER — AMBULATORY - HEALTHEAST (OUTPATIENT)
Dept: NURSING | Facility: CLINIC | Age: 56
End: 2021-04-22

## 2021-05-26 ENCOUNTER — RECORDS - HEALTHEAST (OUTPATIENT)
Dept: ADMINISTRATIVE | Facility: CLINIC | Age: 56
End: 2021-05-26

## 2021-05-27 ENCOUNTER — RECORDS - HEALTHEAST (OUTPATIENT)
Dept: ADMINISTRATIVE | Facility: CLINIC | Age: 56
End: 2021-05-27

## 2021-05-28 ENCOUNTER — RECORDS - HEALTHEAST (OUTPATIENT)
Dept: ADMINISTRATIVE | Facility: CLINIC | Age: 56
End: 2021-05-28

## 2021-05-28 NOTE — PROGRESS NOTES
[facility] Maintenance Exam  [facility]      Assessment/Plan     1.  Healthy female exam.   Health maintenance discussed as appropriate for age and risk factors including:  Nutrition, exercise, alcohol use, tobacco use, safe sexual practices, dental health.  Cancer screening assessed and ordered as indicated. Routine labs as outlined below based on age and risk factors reviewed today. Immunizations reviewed and updated.       1. Routine general medical examination at a health care facility  - Lipid Cascade  - Glycosylated Hemoglobin A1c  - Comprehensive Metabolic Panel  - Ambulatory referral to Cardiology    2. Morbid obesity (H)  Reviewed LSM    3. Type 2 diabetes mellitus without complication, without long-term current use of insulin (H)  Addressed smoking status and aspirin therapy.  Recommended annual eye exam and dental cares. Reviewed foot cares and foot exam.  Blood pressure and lipid management reviewed today.  Vaccines reviewed and updated.  Plan for glucose management includes ongoing focus on healthy diabetic diet and increased activity, continue LSM.  Labs ordered as below including:     Lab Results   Component Value Date    HGBA1C 6.0 05/06/2019   , No results found for: LDL,   Lab Results   Component Value Date    CREATININE 0.77 05/06/2019       - Lipid Cascade  - Glycosylated Hemoglobin A1c  - Comprehensive Metabolic Panel    4. Benign Essential Hypertension  BP Readings from Last 3 Encounters:   05/06/19 112/62   01/11/19 156/90   10/11/18 142/80       well controlled today.  Plan for bloodpressure management includes ongoing focus on healthy DASH type diet and increased activity, encouraged to avoid tobacco products and limit alcohol use, stress reduction, continue amlodipine 10mg daily and losartan/hctz 100/25mg daily.  Labwork and meds ordered and reviewed as below  Lab Results   Component Value Date    K 4.0 05/06/2019      Lab Results   Component Value Date    CREATININE 0.77  05/06/2019       - Comprehensive Metabolic Panel    5. Family history of ischemic heart disease  Pt very worried about having a heart attack with extensive family history.  Continue healthy LSM and appreciate preventive cardiology consultation.    - Ambulatory referral to Cardiology      Return in about 4 months (around 9/6/2019) for Recheck.    Patient Education/AVS:  There are no Patient Instructions on file for this visit.    HPI:      Chief Complaint   Patient presents with     Annual Exam        Zaina Shaikh is a 53 y.o. female and is here for a comprehensive physical exam.     Other concerns today:   Blood pressure check since starting amlodipine this winter.  No side effects- no dizziness. Does get some mild ankle swelling but intermittent.      Back pain- Left lower back that radiates to her butt/hip.  Usually at end of her hour long Deidra class or with walking.  Can bey 7/10.  Feels like sciatica.  Has also noted bilateral hip achiness at night.  Hasn't tried any OTC meds, etc.  No changes in bowels/bladder.  No numbness/tingling in her legs.  No weakness.  Uncomfortable with sitting.  Wants to make sure no cancer.      Wants her heart checked    Ingrown hair in left breast area.  Wants this examined to make sure not cancer.  Will get mammo this summer.     Diabetes- checking sugars a couple days a week  107-113 fasting in past week.  Deidra, treadmill and walking outside.  Lost 25lb with weight watchers since Dec 2018.  Keeping up with eye exams, etc.      Does wear her hearing aids and keeps up with audiology checkups.       Healthy Habits: Body mass index is 36.27 kg/m .  The following high BMI interventions were performed this visit: encouragement to exercise and lifestyle education regarding diet  Regular Exercise: 3-4 days/week 60 min deidra, walking, etc  Healthy Diet:diabetic  Smoking:   Social History     Tobacco Use   Smoking Status Former Smoker     Packs/day: 0.50     Years: 32.00     Pack  years: 16.00     Types: Cigarettes     Start date:      Last attempt to quit: 2015     Years since quitting: 3.4   Smokeless Tobacco Former User     Quit date: 2015     Dental Visits Regularly: Yes  Eye exams: Yes  Pregnancy planning: na   Social History     Substance and Sexual Activity   Sexual Activity Not Currently     Partners: Male     Birth control/protection: Post-menopausal     Seat Belt: Yes  Prevention of Osteoporosis:vitamin D, calcium in diet  Last Dexa:na    Do you take any herbs or supplements that were not prescribed by a doctor? vitamin D      Cancer Screening:  Hemoccults/Colonoscopy: - repeat   Mammogram:  Due this summer  Pap Smear:  Repeat   Lung Cancer: not at risk       Immunization History   Administered Date(s) Administered     DT (pediatric) 2005     Hep A, historic 2007, 2007     Hep B, Adult 10/11/2018, 2019, 2019     Influenza,seasonal quad, PF, 36+MOS 10/11/2018     Pneumo Conj 13-V (2010&after) 2015     Pneumo Polysac 23-V 2017     Td, Adult, Absorbed 2017     Tdap 2007       OB History    Para Term  AB Living   0 0 0 0 0 0   SAB TAB Ectopic Multiple Live Births   0 0 0 0         Meds  Current Outpatient Medications on File Prior to Visit   Medication Sig Dispense Refill     amLODIPine (NORVASC) 10 MG tablet Take 1 tablet (10 mg total) by mouth daily. 30 tablet 4     aspirin 81 MG EC tablet Take 81 mg by mouth daily.       atorvastatin (LIPITOR) 40 MG tablet TAKE 1 TABLET(40 MG) BY MOUTH AT BEDTIME 90 tablet 3     cholecalciferol, vitamin D3, (VITAMIN D3) 2,000 unit Tab        CONTOUR NEXT TEST STRIPS strips TEST ONCE DAILY AS NEEDED 100 strip 3     generic lancets Use 1 each As Directed daily. Dispense brand per patient's insurance at pharmacy discretion. 50 each 6     loratadine (CLARITIN) 10 mg tablet Take 10 mg by mouth daily.       losartan-hydrochlorothiazide (HYZAAR) 100-25 mg per tablet  Take 1 tablet by mouth daily. 90 tablet 4     omeprazole (PRILOSEC) 20 MG capsule TAKE 1 CAPSULE(20 MG) BY MOUTH DAILY 90 capsule 2     triamcinolone (KENALOG) 0.5 % cream APPLY SPARINGLY TO THE AFFECTED AREA AND MASSAGE IN TWICE DAILY 30 g 6     No current facility-administered medications on file prior to visit.        Past Medical History  Past Medical History:   Diagnosis Date     Nicotine abuse 3/12/2015       Surgical History  History reviewed. No pertinent surgical history.    Allergies  Lisinopril    Family History  Family History   Problem Relation Age of Onset     Arthritis Mother      Heart disease Mother      Diabetes Mother      Hyperlipidemia Mother      Hypertension Mother      Nephrolithiasis Mother      Heart disease Father         Acute Anterolateral Myocardial Infarction     Diabetes Father      Hyperlipidemia Father      Diabetes Sister      Stroke Sister      Diabetes Brother      Colon cancer Paternal Grandmother 80        Adenocarcinoma of the large intestine     Hypertension Brother      Heart attack Brother      Stroke Sister      Diabetes Maternal Grandmother      Heart attack Paternal Grandfather      Sleep apnea Brother      No Medical Problems Sister      Breast cancer Neg Hx        Social History  Social History     Socioeconomic History     Marital status: Single     Spouse name: Not on file     Number of children: 0     Years of education: Not on file     Highest education level: Not on file   Occupational History     Occupation:      Employer: [employer]   Social Needs     Financial resource strain: Not hard at all     Food insecurity:     Worry: Never true     Inability: Never true     Transportation needs:     Medical: No     Non-medical: No   Tobacco Use     Smoking status: Former Smoker     Packs/day: 0.50     Years: 32.00     Pack years: 16.00     Types: Cigarettes     Start date: 1983     Last attempt to quit: 12/2015     Years since quitting: 3.4      "Smokeless tobacco: Former User     Quit date: 12/26/2015   Substance and Sexual Activity     Alcohol use: No     Comment: occasionally     Drug use: No     Sexual activity: Not Currently     Partners: Male     Birth control/protection: Post-menopausal   Lifestyle     Physical activity:     Days per week: 4 days     Minutes per session: 60 min     Stress: To some extent   Relationships     Social connections:     Talks on phone: Not on file     Gets together: Not on file     Attends [employer] service: Not on file     Active member of club or organization: Not on file     Attends meetings of clubs or organizations: Not on file     Relationship status: Not on file     Intimate partner violence:     Fear of current or ex partner: Not on file     Emotionally abused: Not on file     Physically abused: Not on file     Forced sexual activity: Not on file   Other Topics Concern     Not on file   Social History Narrative    Lives with mom         Review of Systems   Complete ROS including General, HEENT, CV, Pulmonary, GI, , Genital, Neuro, Psych, MSK, Heme, Endocrine all within normal except as noted in the HPI.      Objective:   /62 (Patient Site: Right Arm, Patient Position: Sitting, Cuff Size: Adult Large)   Pulse 68   Temp 98.7  F (37.1  C) (Oral)   Ht 5' 5.75\" (1.67 m)   Wt (!) 223 lb (101.2 kg)   BMI 36.27 kg/m   Body mass index is 36.27 kg/m .  Wt Readings from Last 3 Encounters:   05/06/19 (!) 223 lb (101.2 kg)   01/11/19 (!) 240 lb (108.9 kg)   10/11/18 (!) 244 lb (110.7 kg)     PHQ-9 Total Score: 2 (6/11/2018 11:00 AM)    No data recorded  PHQ-2 Total Score: 0 (5/6/2019  8:48 AM)  Depression Follow-up Plan: mental health care assessment (6/11/2018 11:00 AM)      Complete physical exam including:  General, HEENT, Neck, breast, back, CV, Pulmonary, Abdominal, extremities, skin, neuro, psych, lymph exams all within normal.    Abnormalities include:  GYN exam deferred      Results for orders placed or " performed in visit on 05/06/19   Lipid Cascade   Result Value Ref Range    Cholesterol 167 <=199 mg/dL    Triglycerides 101 <=149 mg/dL    HDL Cholesterol 43 (L) >=50 mg/dL    LDL Calculated 104 <=129 mg/dL    Patient Fasting > 8hrs? Yes    Glycosylated Hemoglobin A1c   Result Value Ref Range    Hemoglobin A1c 6.0 3.5 - 6.0 %   Comprehensive Metabolic Panel   Result Value Ref Range    Sodium 143 136 - 145 mmol/L    Potassium 4.0 3.5 - 5.0 mmol/L    Chloride 105 98 - 107 mmol/L    CO2 26 22 - 31 mmol/L    Anion Gap, Calculation 12 5 - 18 mmol/L    Glucose 99 70 - 125 mg/dL    BUN 15 8 - 22 mg/dL    Creatinine 0.77 0.60 - 1.10 mg/dL    GFR MDRD Af Amer >60 >60 mL/min/1.73m2    GFR MDRD Non Af Amer >60 >60 mL/min/1.73m2    Bilirubin, Total 0.7 0.0 - 1.0 mg/dL    Calcium 10.2 8.5 - 10.5 mg/dL    Protein, Total 7.3 6.0 - 8.0 g/dL    Albumin 4.2 3.5 - 5.0 g/dL    Alkaline Phosphatase 80 45 - 120 U/L    AST 16 0 - 40 U/L    ALT 21 0 - 45 U/L

## 2021-05-29 NOTE — TELEPHONE ENCOUNTER
Refill Approved    Rx renewed per Medication Renewal Policy. Medication was last renewed on 1/11/19.    Danelle Sinha, [facility] Triage/Med Refill 6/10/2019     Requested Prescriptions   Pending Prescriptions Disp Refills     amLODIPine (NORVASC) 10 MG tablet [Pharmacy Med Name: AMLODIPINE BESYLATE 10MG TABLETS] 30 tablet 0     Sig: TAKE 1 TABLET(10 MG) BY MOUTH DAILY       Calcium-Channel Blockers Protocol Passed - 6/7/2019  7:36 AM        Passed - PCP or prescribing provider visit in past 12 months or next 3 months     Last office visit with prescriber/PCP: 1/11/2019 Madisyn Stacy MD OR same dept: 1/11/2019 Madisyn Stacy MD OR same specialty: 1/11/2019 Madisyn Stacy MD  Last physical: 5/6/2019 Last MTM visit: Visit date not found   Next visit within 3 mo: Visit date not found  Next physical within 3 mo: Visit date not found  Prescriber OR PCP: Madisyn Stacy MD  Last diagnosis associated with med order: 1. Benign Essential Hypertension  - amLODIPine (NORVASC) 10 MG tablet [Pharmacy Med Name: AMLODIPINE BESYLATE 10MG TABLETS]; TAKE 1 TABLET(10 MG) BY MOUTH DAILY  Dispense: 30 tablet; Refill: 0    If protocol passes may refill for 12 months if within 3 months of last provider visit (or a total of 15 months).             Passed - Blood pressure filed in past 12 months     BP Readings from Last 1 Encounters:   05/06/19 112/62

## 2021-05-30 NOTE — PROGRESS NOTES
Thank you for asking the [facility] team to see Zaina Shaikh in consultation  to evaluate family history.      Assessment/Plan:   High cardiac risk given her family history, recent smoking, diabetes, obesity, hypertension, hyperlipidemia. Continue low dose aspirin and high intensity statin. Currently, she is asymptomatic. Zaina will pursue a coronary calcium score to help further risk stratify. If her score is high we discussed stress testing and increasing her atorvastatin to 80mg daily.     Otherwise, we discussed risk modification with the mediterranean diet, cardiovascular exercise, weight loss, never smoking again.    Plan f/u pending the coronary calcium score results       Current History:   Zaina Shaikh is a 53 y.o. obese woman with history of smoking (quit 2016), diabetes, hypertension, hyperlipidemia, and sleep apnea who comes to cardiology for risk stratification. Zaina has bad knees but does participate in patti 3 times per week and walk for an hour other days. She denies any chest pain, dyspnea, dizziness. She has mild pedal edema since starting amlodipine and does get palpitations when she gets anxious. There is no pnd/orthopnea. Zaina has lost 25 lbs this year with weight watchers. She has never undergone any cardiac testing per her recall and had a normal EKG in 2016. LDL was 104 on atorvastatin 40mg recently    Past Medical History:     Past Medical History:   Diagnosis Date     Diabetes mellitus (H)      Hyperlipidemia      Hypertension      Nicotine abuse 3/12/2015     Obesity      Sleep apnea        Past Surgical History:   History reviewed. No pertinent surgical history.    Family History:     Family History   Problem Relation Age of Onset     Arthritis Mother      Heart disease Mother      Diabetes Mother      Hyperlipidemia Mother      Hypertension Mother      Nephrolithiasis Mother      Heart disease Father         Acute Anterolateral Myocardial Infarction     Diabetes Father       Hyperlipidemia Father      Heart attack Father      Diabetes Sister      Stroke Sister      Diabetes Brother      Colon cancer Paternal Grandmother 80        Adenocarcinoma of the large intestine     Hypertension Brother      Heart attack Brother      Stroke Sister      Diabetes Maternal Grandmother      Heart attack Paternal Grandfather      Sleep apnea Brother      No Medical Problems Sister      Breast cancer Neg Hx        Social History:    reports that she quit smoking about 3 years ago. Her smoking use included cigarettes. She started smoking about 36 years ago. She has a 16.00 pack-year smoking history. She quit smokeless tobacco use about 3 years ago. She reports that she does not drink alcohol or use drugs.    Meds:     Current Outpatient Medications   Medication Sig Note     amLODIPine (NORVASC) 10 MG tablet Take 1 tablet (10 mg total) by mouth daily.      aspirin 81 MG EC tablet Take 81 mg by mouth daily.      atorvastatin (LIPITOR) 40 MG tablet TAKE 1 TABLET(40 MG) BY MOUTH AT BEDTIME      cholecalciferol, vitamin D3, (VITAMIN D3) 2,000 unit Tab       CONTOUR NEXT TEST STRIPS strips TEST ONCE DAILY AS NEEDED      generic lancets Use 1 each As Directed daily. Dispense brand per patient's insurance at pharmacy discretion.      loratadine (CLARITIN) 10 mg tablet Take 10 mg by mouth daily. 9/13/2017: take one a day to stay on top of allergy season Received from: Patient     losartan-hydrochlorothiazide (HYZAAR) 100-25 mg per tablet Take 1 tablet by mouth daily.      omeprazole (PRILOSEC) 20 MG capsule TAKE 1 CAPSULE(20 MG) BY MOUTH DAILY      triamcinolone (KENALOG) 0.5 % cream APPLY SPARINGLY TO THE AFFECTED AREA AND MASSAGE IN TWICE DAILY        Allergies:   Lisinopril    Review of Systems:   Review of Systems:   General: WNL  Eyes: WNL  Ears/Nose/Throat: Hearing Loss  Lungs: WNL  Heart: WNL  Stomach: WNL  Bladder: WNL  Muscle/Joints: WNL  Skin: WNL  Nervous System: WNL  Mental Health: WNL     Blood:  "WNL       Objective:      Physical Exam  @LASTENCWT:3@  5' 6\" (1.676 m)  @BMI:3@  /72 (Patient Site: Left Arm, Patient Position: Sitting, Cuff Size: Adult Large)   Pulse 68   Resp 16   Ht 5' 6\" (1.676 m)   Wt (!) 226 lb (102.5 kg)   BMI 36.48 kg/m      General Appearance:   Alert, cooperative and in no acute distress.   HEENT:  No scleral icterus; the mucous membranes were pink and moist.   Neck: JVP flat. No thyromegaly. No HJR   Chest: The spine was straight. The chest was symmetric.   Lungs:   Respirations unlabored; the lungs are clear to auscultation.   Cardiovascular:   S1 and S2 normal and without murmur. No clicks or rubs. No carotid bruits noted. Right DP, PT, and radial pulses 2+. Left DP, PT, and radial pulses 2+.   Abdomen:  No organomegaly, masses, bruits, or tenderness. Bowels sounds are present   Extremities: No cyanosis, clubbing, or edema.   Skin: No xanthelasma.   Neurologic: Mood and affect are appropriate.         Lab Review   Lab Results   Component Value Date     05/06/2019     01/11/2019     10/11/2018    K 4.0 05/06/2019    K 4.4 01/11/2019    K 4.3 10/11/2018     05/06/2019     01/11/2019     10/11/2018    CO2 26 05/06/2019    CO2 28 01/11/2019    CO2 27 10/11/2018    BUN 15 05/06/2019    BUN 16 01/11/2019    BUN 12 10/11/2018    CREATININE 0.77 05/06/2019    CREATININE 0.78 01/11/2019    CREATININE 0.79 10/11/2018    CALCIUM 10.2 05/06/2019    CALCIUM 9.6 01/11/2019    CALCIUM 9.7 10/11/2018     Lab Results   Component Value Date    WBC 5.9 06/08/2017    HGB 12.8 06/08/2017    HGB 12.9 05/08/2014    HGB 13.7 04/20/2012    HCT 38.6 06/08/2017    MCV 81 06/08/2017     06/08/2017     Lab Results   Component Value Date    CHOL 167 05/06/2019    CHOL 165 06/11/2018    CHOL 137 06/08/2017    TRIG 101 05/06/2019    TRIG 78 06/11/2018    TRIG 79 06/08/2017    HDL 43 (L) 05/06/2019    HDL 40 (L) 06/11/2018    HDL 40 (L) 06/08/2017     No results " found for: NII Ordoñez M.D.

## 2021-05-30 NOTE — PATIENT INSTRUCTIONS - HE
- Mediterranean diet, which is no processed foods and very limited animal fats    - Consider a coronary calcium score    - Cardiovascular exercise 30 minutes daily    - Never smoke again    - Weight loss

## 2021-05-31 VITALS — BODY MASS INDEX: 38.54 KG/M2 | WEIGHT: 237 LBS

## 2021-05-31 VITALS — WEIGHT: 242.04 LBS | HEIGHT: 66 IN | BODY MASS INDEX: 38.9 KG/M2

## 2021-05-31 VITALS — BODY MASS INDEX: 39.52 KG/M2 | WEIGHT: 243 LBS

## 2021-05-31 NOTE — TELEPHONE ENCOUNTER
Refill Approved    Rx renewed per Medication Renewal Policy. Medication was last renewed on 8/5/18.    Savannah Manuel, [facility] Triage/Med Refill 8/15/2019     Requested Prescriptions   Pending Prescriptions Disp Refills     CONTOUR NEXT TEST STRIPS strips [Pharmacy Med Name: CONTOUR NEXT TEST [id number]] 100 strip 0     Sig: TEST ONCE DAILY AS NEEDED       Diabetic Supplies Refill Protocol Passed - 8/15/2019  5:14 PM        Passed - Visit with PCP or prescribing provider visit in last 6 months     Last office visit with prescriber/PCP: 1/11/2019 Madisyn Stacy MD OR same dept: 1/11/2019 Madisyn Stacy MD OR same specialty: 1/11/2019 Madisyn Stacy MD  Last physical: 5/6/2019 Last MTM visit: Visit date not found   Next visit within 3 mo: Visit date not found  Next physical within 3 mo: Visit date not found  Prescriber OR PCP: Madisyn Stacy MD  Last diagnosis associated with med order: 1. Type 2 diabetes mellitus without complication, without long-term current use of insulin (H)  - CONTOUR NEXT TEST STRIPS strips [Pharmacy Med Name: CONTOUR NEXT TEST [id number]]; TEST ONCE DAILY AS NEEDED  Dispense: 100 strip; Refill: 0    If protocol passes may refill for 12 months if within 3 months of last provider visit (or a total of 15 months).             Passed - A1C in last 6 months     Hemoglobin A1c   Date Value Ref Range Status   05/06/2019 6.0 3.5 - 6.0 % Final

## 2021-06-01 VITALS — BODY MASS INDEX: 38.87 KG/M2 | WEIGHT: 239 LBS

## 2021-06-01 VITALS — WEIGHT: 243.5 LBS | BODY MASS INDEX: 39.13 KG/M2 | HEIGHT: 66 IN

## 2021-06-01 VITALS — BODY MASS INDEX: 38.57 KG/M2 | WEIGHT: 240 LBS | HEIGHT: 66 IN

## 2021-06-01 NOTE — TELEPHONE ENCOUNTER
Refill Approved    Rx renewed per Medication Renewal Policy. Medication was last renewed on 9/16/18.12/18/19    Savannah Manuel, [facility] Triage/Med Refill 9/18/2019     Requested Prescriptions   Pending Prescriptions Disp Refills     atorvastatin (LIPITOR) 40 MG tablet [Pharmacy Med Name: ATORVASTATIN 40MG TABLETS] 90 tablet 0     Sig: TAKE 1 TABLET(40 MG) BY MOUTH AT BEDTIME       Statins Refill Protocol (Hmg CoA Reductase Inhibitors) Passed - 9/18/2019  7:39 AM        Passed - PCP or prescribing provider visit in past 12 months      Last office visit with prescriber/PCP: 1/11/2019 Madisyn Stacy MD OR same dept: 1/11/2019 Madisyn Stacy MD OR same specialty: 1/11/2019 Madisyn Stacy MD  Last physical: 5/6/2019 Last MTM visit: Visit date not found   Next visit within 3 mo: Visit date not found  Next physical within 3 mo: Visit date not found  Prescriber OR PCP: Madisyn Stayc MD  Last diagnosis associated with med order: 1. Type 2 diabetes mellitus without complication, without long-term current use of insulin (H)  - atorvastatin (LIPITOR) 40 MG tablet [Pharmacy Med Name: ATORVASTATIN 40MG TABLETS]; TAKE 1 TABLET(40 MG) BY MOUTH AT BEDTIME  Dispense: 90 tablet; Refill: 0    2. Mixed hyperlipidemia  - atorvastatin (LIPITOR) 40 MG tablet [Pharmacy Med Name: ATORVASTATIN 40MG TABLETS]; TAKE 1 TABLET(40 MG) BY MOUTH AT BEDTIME  Dispense: 90 tablet; Refill: 0    3. GERD (gastroesophageal reflux disease)  - omeprazole (PRILOSEC) 20 MG capsule [Pharmacy Med Name: OMEPRAZOLE 20MG CAPSULES]; TAKE 1 CAPSULE(20 MG) BY MOUTH DAILY  Dispense: 90 capsule; Refill: 0    If protocol passes may refill for 12 months if within 3 months of last provider visit (or a total of 15 months).             omeprazole (PRILOSEC) 20 MG capsule [Pharmacy Med Name: OMEPRAZOLE 20MG CAPSULES] 90 capsule 0     Sig: TAKE 1 CAPSULE(20 MG) BY MOUTH DAILY       GI Medications Refill Protocol Passed - 9/18/2019  7:39 AM         Passed - PCP or prescribing provider visit in last 12 or next 3 months.     Last office visit with prescriber/PCP: 1/11/2019 Madisyn Stacy MD OR same dept: 1/11/2019 Madisyn Stacy MD OR same specialty: 1/11/2019 Madisyn Stacy MD  Last physical: 5/6/2019 Last MTM visit: Visit date not found   Next visit within 3 mo: Visit date not found  Next physical within 3 mo: Visit date not found  Prescriber OR PCP: Madisyn Stacy MD  Last diagnosis associated with med order: 1. Type 2 diabetes mellitus without complication, without long-term current use of insulin (H)  - atorvastatin (LIPITOR) 40 MG tablet [Pharmacy Med Name: ATORVASTATIN 40MG TABLETS]; TAKE 1 TABLET(40 MG) BY MOUTH AT BEDTIME  Dispense: 90 tablet; Refill: 0    2. Mixed hyperlipidemia  - atorvastatin (LIPITOR) 40 MG tablet [Pharmacy Med Name: ATORVASTATIN 40MG TABLETS]; TAKE 1 TABLET(40 MG) BY MOUTH AT BEDTIME  Dispense: 90 tablet; Refill: 0    3. GERD (gastroesophageal reflux disease)  - omeprazole (PRILOSEC) 20 MG capsule [Pharmacy Med Name: OMEPRAZOLE 20MG CAPSULES]; TAKE 1 CAPSULE(20 MG) BY MOUTH DAILY  Dispense: 90 capsule; Refill: 0    If protocol passes may refill for 12 months if within 3 months of last provider visit (or a total of 15 months).

## 2021-06-01 NOTE — TELEPHONE ENCOUNTER
Refill Approved    Rx renewed per Medication Renewal Policy. Medication was last renewed on 6/11/18.    Savannah Manuel, [facility] Triage/Med Refill 9/12/2019     Requested Prescriptions   Pending Prescriptions Disp Refills     losartan-hydrochlorothiazide (HYZAAR) 100-25 mg per tablet [Pharmacy Med Name: LOSARTAN/HCTZ 100/25MG TABLETS] 90 tablet 0     Sig: TAKE 1 TABLET BY MOUTH DAILY       Diuretics/Combination Diuretics Refill Protocol  Passed - 9/12/2019  8:38 PM        Passed - Visit with PCP or prescribing provider visit in past 12 months     Last office visit with prescriber/PCP: 1/11/2019 Madisyn Stacy MD OR same dept: 1/11/2019 Madisyn Stacy MD OR same specialty: 1/11/2019 Madisyn Stacy MD  Last physical: 5/6/2019 Last MTM visit: Visit date not found   Next visit within 3 mo: Visit date not found  Next physical within 3 mo: Visit date not found  Prescriber OR PCP: Madisyn Stacy MD  Last diagnosis associated with med order: 1. Benign Essential Hypertension  - losartan-hydrochlorothiazide (HYZAAR) 100-25 mg per tablet [Pharmacy Med Name: LOSARTAN/HCTZ 100/25MG TABLETS]; Take 1 tablet by mouth daily.  Dispense: 90 tablet; Refill: 0    If protocol passes may refill for 12 months if within 3 months of last provider visit (or a total of 15 months).             Passed - Serum Potassium in past 12 months      Lab Results   Component Value Date    Potassium 4.0 05/06/2019             Passed - Serum Sodium in past 12 months      Lab Results   Component Value Date    Sodium 143 05/06/2019             Passed - Blood pressure on file in past 12 months     BP Readings from Last 1 Encounters:   06/28/19 120/72             Passed - Serum Creatinine in past 12 months      Creatinine   Date Value Ref Range Status   05/06/2019 0.77 0.60 - 1.10 mg/dL Final

## 2021-06-02 ENCOUNTER — RECORDS - HEALTHEAST (OUTPATIENT)
Dept: ADMINISTRATIVE | Facility: CLINIC | Age: 56
End: 2021-06-02

## 2021-06-02 VITALS — WEIGHT: 244 LBS | BODY MASS INDEX: 39.38 KG/M2

## 2021-06-02 VITALS — WEIGHT: 240 LBS | BODY MASS INDEX: 38.74 KG/M2

## 2021-06-02 NOTE — TELEPHONE ENCOUNTER
,  Pt called requesting referral for Dermatology change in mole. Please place referral or let me know if you wantn to see her .  Thank you

## 2021-06-02 NOTE — TELEPHONE ENCOUNTER
"Pt calling requesting a referral to Dermatology for mole.   \"mole\" on buttock- upper right.   \"at first I thought it was a tiny bruise\"- discoloration of skin around mole.   Not tender to touch.   Not warm to touch.   Mole is smaller than a aime, discoloration around it is about quarter sized.   Is not raised.   Just noticed last night.   No other associated symptoms, does report an ache in right hip- but this is not new.     Look like mole she has always had there, but the discoloration around it is new.     Had a mole removed on the same side about 2 years ago at a dermatology clinic in [place], [place]. Pt was unsure of the name of clinic.     Discussed with patient if changes in area especially warm touch, redness, fever s/s of infection should call clinic back to be seen by PCP. Pt verbalized understanding.       Pt is requesting a referral be placed to see Dermatology.       Alonzo PanchalRN Care Connection Triage      Reason for Disposition    Patient wants to be seen    Protocols used: SKIN LESION - MOLES OR GROWTHS-A-OH      "

## 2021-06-02 NOTE — PROGRESS NOTES
[facility] Office Visit    Chief Complaint:  Chief Complaint   Patient presents with     Follow-up     10/10/19 back pain     Concerns     a1c         Assessment/Plan:  1. Type 2 diabetes mellitus without complication, without long-term current use of insulin (H)  Well  Controlled.  Addressed smoking status and aspirin therapy.  Recommended annual eye exam and dental cares. Reviewed foot cares and foot exam.  Blood pressure and lipid management reviewed today.  Vaccines reviewed and updated.  Plan for glucose management includes ongoing focus on healthy diabetic diet and increased activity, continue LSM only.  Labs ordered as below including:     Lab Results   Component Value Date    HGBA1C 6.1 (H) 10/21/2019   , No results found for: LDL,   Lab Results   Component Value Date    CREATININE 0.77 05/06/2019       - Glycosylated Hemoglobin A1c; Future  - Microalbumin, Random Urine  - Glycosylated Hemoglobin A1c    2. Acute left-sided low back pain without sciatica  Decreased pain with rest.  Recommend course of PT to learn home program and prevent future back pain.  MRI in 6-8 weeks if ongoing sx.    - Ambulatory referral to PT/OT    3. Benign Essential Hypertension  BP Readings from Last 3 Encounters:   10/21/19 122/66   10/05/19 137/63   06/28/19 120/72       well controlled today.  Plan for bloodpressure management includes ongoing focus on healthy DASH type diet and increased activity, encouraged to avoid tobacco products and limit alcohol use, stress reduction, continue amlodine 10mg daily.  Labwork and meds ordered and reviewed as below  Lab Results   Component Value Date    K 4.0 05/06/2019      Lab Results   Component Value Date    CREATININE 0.77 05/06/2019         4. Vaccine counseling  - Varicella Zoster, Recombinant Vaccine IM    5. Need for influenza vaccination  - Influenza, Recombinant, Inj, Quadrivalent, PF, 18+YRS      No follow-ups on file.  The following high BMI interventions were  performed this visit: encouragement to exercise and lifestyle education regarding diet    Patient Education/AVS:  There are no Patient Instructions on file for this visit.    HPI:   Zaina Shaikh is a 54 y.o. female c/o f/u from recent ED visit for excruciating left flank/lower back pain. Pt notes that a shot in the butt in ED both times really helped the best.  Heating packs and icing helped.  Pt is stressed about the CT scan report.  We reviewed all her tests and scans from ED today and reassured pt.  Pt notes she does get an ache in her right butt/lateral hip/thigh for months.  No more groin pain since she stopped wearing flip flops.  Continues to do [employer]ra but hasn't been able to since starting her back pain.  Wondering if this is where she injured herself since she has been pushing it more at [employer]. Since her last ED visit has been doing tylenol every 6 hours and using a lumbar cushion at work.  Heat and ice during the day.  Using muscle relaxer at bedtime to help with achiness that starts in evenings.  Notes an overall achiness and no more sharp pain.      Pt notes she has been under more stress with her mom needing more help due to wrist fracture and multiple falls.  Best friend's dad becoming sick.  Boyfriend medical issues regarding peripheral vascular disease.  Car broke down during all this.  Mom is currently in TCU after her second fall.      Hasn't been checking sugars lately and tends to be 110-120 fasting.  Has been doing Weight Watchers.  Has been not eating as strictly with all her stress and not being able to follow her regular routine caring for others.      ROS:  Constitutional, CV, Resp, GI, , MSK, skin, neuro, psych all negative except as outlined in the HPI above and patient denies any other symptoms.      History summarized from1-2:ED 10/5/19-seen for left lower back pain that started this day. Normal ua.  Dx with muscle spasm. Treated with muscle relaxer, narcotic pain pill and medrol  chapin.       ED 10/10/19- pt seen for flank pain starting 5 days prior.  Left lower back that wasn't getting better with treatment from 10/5/19 in ED as above.  Pain was getting worse. Worse when she lays flat.  CT scanning as below not showing any concern for left kidney. Treated with left flank/lower back pain without sciatica.  Zanaflex, tylenol/ibuprofen and f/u with pcp to discuss PT next.    Radiology tests reviewed-1: CT abd/pelvis 10/10/19- tiny lymph nodes and stranding of central mesentery- nonspecific.  Normal left kidney.    Lab tests reviewed-1: normal ua, cmp, hm, d-dimer, lipase etc  ucx negative.     Physical Exam:  /66 (Patient Site: Right Arm, Patient Position: Sitting, Cuff Size: Adult Large)   Pulse 67   Temp 98.1  F (36.7  C) (Oral)   Resp 16   Wt (!) 224 lb (101.6 kg)   LMP 06/10/2017 (Approximate)   SpO2 98%   BMI 36.15 kg/m   Body mass index is 36.15 kg/m . Patient's last menstrual period was 06/10/2017 (approximate).  Vital signs reviewed  Wt Readings from Last 3 Encounters:   10/21/19 (!) 224 lb (101.6 kg)   10/05/19 (!) 224 lb (101.6 kg)   06/28/19 (!) 226 lb (102.5 kg)     Social History     Tobacco Use   Smoking Status Former Smoker     Packs/day: 0.50     Years: 32.00     Pack years: 16.00     Types: Cigarettes     Start date: 1983     Last attempt to quit: 12/2015     Years since quitting: 3.8   Smokeless Tobacco Former User     Quit date: 12/26/2015     Social History     Substance and Sexual Activity   Sexual Activity Not Currently     Partners: Male     Birth control/protection: Post-menopausal     No data recorded  No data recorded  PHQ-2 Total Score: 0 (5/6/2019  8:48 AM)    No data recorded    All normal as below except abnormalities include: vague tenderness in left mid to lower back in soft tissue only.    General is a  54 y.o. female sitting comfortably in no apparent distress.   Neck: Supple without lymphadenopathy or thyromegally  CV: Regular rate and rhythm  S1S2 without rubs, murmurs or gallops,   Lungs: Clear to auscultation bilaterally  Abd:  +BS, soft NT/ND,  No masses or organomegally  Extremities: Warm, No Edema, 2+ Pedal and radial pulses bilaterally  Skin: No lesions or rashes noted  Neuro/MSK: Able to ambulate around the exam room with equal movement, strength and normal coordination of the upper and lower extremeties symmetrically    Results for orders placed or performed in visit on 10/21/19   Glycosylated Hemoglobin A1c   Result Value Ref Range    Hemoglobin A1c 6.1 (H) 3.5 - 6.0 %       Med list and active problem list reviewed and updated as part of this encounter    Current Outpatient Medications on File Prior to Visit   Medication Sig Dispense Refill     acetaminophen (TYLENOL) 500 MG tablet        amLODIPine (NORVASC) 10 MG tablet Take 1 tablet (10 mg total) by mouth daily. 90 tablet 3     aspirin 81 MG EC tablet Take 81 mg by mouth daily.       atorvastatin (LIPITOR) 40 MG tablet TAKE 1 TABLET(40 MG) BY MOUTH AT BEDTIME 90 tablet 2     cholecalciferol, vitamin D3, (VITAMIN D3) 2,000 unit Tab        CONTOUR NEXT TEST STRIPS strips TEST ONCE DAILY AS NEEDED 100 strip 3     generic lancets Use 1 each As Directed daily. Dispense brand per patient's insurance at pharmacy discretion. 50 each 6     loratadine (CLARITIN) 10 mg tablet Take 10 mg by mouth daily.       losartan-hydrochlorothiazide (HYZAAR) 100-25 mg per tablet TAKE 1 TABLET BY MOUTH DAILY 90 tablet 2     omeprazole (PRILOSEC) 20 MG capsule TAKE 1 CAPSULE(20 MG) BY MOUTH DAILY 90 capsule 2     tiZANidine (ZANAFLEX) 4 MG tablet        triamcinolone (KENALOG) 0.5 % cream APPLY SPARINGLY TO THE AFFECTED AREA AND MASSAGE IN TWICE DAILY 30 g 6     No current facility-administered medications on file prior to visit.          Madisyn Stacy MD

## 2021-06-02 NOTE — TELEPHONE ENCOUNTER
Referral Request  Type of referral: dermatologist  Who s requesting: Patient  Why the request: mole has changed in appearance.  Transferred to RN for assessment.   Have you been seen for this request: No:  Appointment Offered:  transferred to RN for advise.   Does patient have a preference on a group/provider? Went to a dermatologist in [place] in past .  Ok to return there.   Okay to leave a detailed message?  Yes

## 2021-06-03 VITALS
TEMPERATURE: 98.1 F | BODY MASS INDEX: 36.15 KG/M2 | DIASTOLIC BLOOD PRESSURE: 66 MMHG | RESPIRATION RATE: 16 BRPM | SYSTOLIC BLOOD PRESSURE: 122 MMHG | OXYGEN SATURATION: 98 % | WEIGHT: 224 LBS | HEART RATE: 67 BPM

## 2021-06-03 VITALS — BODY MASS INDEX: 36.32 KG/M2 | WEIGHT: 226 LBS | HEIGHT: 66 IN

## 2021-06-03 VITALS — WEIGHT: 223 LBS | BODY MASS INDEX: 35.84 KG/M2 | HEIGHT: 66 IN

## 2021-06-03 NOTE — TELEPHONE ENCOUNTER
Who is calling:  Patient is calling.  Reason for Call:  A dermatology referral order was faxed to them on 10/31/19.  Fax number is:  [phone].  Patient states that they have not received the referral order and is asking us to refax it.    Date of last appointment with primary care: 10/21/19  Okay to leave a detailed message: Yes

## 2021-06-03 NOTE — TELEPHONE ENCOUNTER
Called and let patient know we faxed referral order to [facility] at fax number [phone]

## 2021-06-04 VITALS
TEMPERATURE: 98.4 F | HEART RATE: 82 BPM | HEIGHT: 66 IN | SYSTOLIC BLOOD PRESSURE: 131 MMHG | BODY MASS INDEX: 39.05 KG/M2 | DIASTOLIC BLOOD PRESSURE: 79 MMHG | WEIGHT: 243 LBS

## 2021-06-04 VITALS
OXYGEN SATURATION: 99 % | DIASTOLIC BLOOD PRESSURE: 78 MMHG | SYSTOLIC BLOOD PRESSURE: 135 MMHG | BODY MASS INDEX: 37.14 KG/M2 | TEMPERATURE: 98.2 F | HEART RATE: 81 BPM | WEIGHT: 230.1 LBS

## 2021-06-05 VITALS
OXYGEN SATURATION: 99 % | WEIGHT: 249 LBS | RESPIRATION RATE: 16 BRPM | DIASTOLIC BLOOD PRESSURE: 82 MMHG | SYSTOLIC BLOOD PRESSURE: 132 MMHG | BODY MASS INDEX: 40.19 KG/M2 | HEART RATE: 69 BPM

## 2021-06-05 NOTE — PATIENT INSTRUCTIONS - HE
"You were seen today for an infection of the middle ear, also called otitis media.    Treatment:  - Use antibiotics as prescribed until completion, even if symptoms improve  - May use tylenol or ibuprofen for pain and discomfort  - Should notice symptom improvement in the next 36-48 hours  - Recommend daily use of a probiotic while taking prescribed medication (a common brand is Culturelle, yogurt with \"active cultures\" are also appropriate)    When to come back sooner for re-evaluation?  - If symptoms have not begun improving after 72 hours of taking antibiotics  - Develop a fever or current fever worsens  - Become short of breath  - Neck stiffness  - Difficulty swallowing       You were also seen today for an infection of the outer ear, also called otitis externa.    Treatment:  - Use antibiotic drops as prescribed until completion, even if symptoms improve  - May use tylenol or ibuprofen for pain and discomfort  - Should notice symptom improvement in the next 72 hours  - Avoid swimming or getting fluid in the infected ear until 24 hours after symptoms resolve    When to return for re-evaluation?  - If symptoms have not begun improving in 72 hours  - Develop a fever of 100.4F or current fever worsens  - Becomes short of breath  - Neck stiffness  - Difficulty swallowing   - Worsening ear pain despite treatment  - Spreading redness, swelling, and tenderness    Throat    Based on your symptoms, I suspect you have a bacterial tonsil infection known as strep throat We will treat with a course of antibiotics. Please complete the full course of antibiotics. You can take your medication with food and with a probiotic such as Culturelle to prevent stomach irritation. You will be contagious for 24 hours following initiation of the medication.    You may use Tylenol or Motrin for pain and fevers.    May drink warm tea, gargle saline solution, or use throat lozenges to sooth throat pain.    Change toothbrush after 48 hours of " starting the antibiotic to prevent reinfection.    Watch for resolution of symptoms in the next few days. If you continue to have high fevers, begin to have difficulty swallowing or breathing, notice worsening neck pain, or difficulty moving neck, please return to clinic or present to the emergency room immediately. Otherwise, follow up with your primary care provider as needed.

## 2021-06-05 NOTE — PROGRESS NOTES
"  Assessment & Plan:       1. Non-recurrent acute suppurative otitis media of both ears without spontaneous rupture of tympanic membranes  amoxicillin (AMOXIL) 875 MG tablet   2. Streptococcal pharyngitis  amoxicillin (AMOXIL) 875 MG tablet   3. Acute otitis externa of left ear, unspecified type  neomycin-polymyxin-hydrocortisone (CORTISPORIN) otic solution     Medical Decision Making  Patient tested positive for strep pharyngitis and has signs of acute bilateral otitis media, with patient signs of otitis externa in the left ear canal.  Will treat patient with oral antibiotics as well as antibiotic eardrops.  Otherwise recommended over-the-counter analgesics, salt water gargles, and warm tea with honey as needed.  Discussed avoidance of water exposure in the ears.  Discussed signs of worsening symptoms and when to follow-up with PCP if no symptom improvement.    Patient Instructions   You were seen today for an infection of the middle ear, also called otitis media.    Treatment:  - Use antibiotics as prescribed until completion, even if symptoms improve  - May use tylenol or ibuprofen for pain and discomfort  - Should notice symptom improvement in the next 36-48 hours  - Recommend daily use of a probiotic while taking prescribed medication (a common brand is Culturelle, yogurt with \"active cultures\" are also appropriate)    When to come back sooner for re-evaluation?  - If symptoms have not begun improving after 72 hours of taking antibiotics  - Develop a fever or current fever worsens  - Become short of breath  - Neck stiffness  - Difficulty swallowing       You were also seen today for an infection of the outer ear, also called otitis externa.    Treatment:  - Use antibiotic drops as prescribed until completion, even if symptoms improve  - May use tylenol or ibuprofen for pain and discomfort  - Should notice symptom improvement in the next 72 hours  - Avoid swimming or getting fluid in the infected ear until 24 " hours after symptoms resolve    When to return for re-evaluation?  - If symptoms have not begun improving in 72 hours  - Develop a fever of 100.4F or current fever worsens  - Becomes short of breath  - Neck stiffness  - Difficulty swallowing   - Worsening ear pain despite treatment  - Spreading redness, swelling, and tenderness    Throat    Based on your symptoms, I suspect you have a bacterial tonsil infection known as strep throat We will treat with a course of antibiotics. Please complete the full course of antibiotics. You can take your medication with food and with a probiotic such as Culturelle to prevent stomach irritation. You will be contagious for 24 hours following initiation of the medication.    You may use Tylenol or Motrin for pain and fevers.    May drink warm tea, gargle saline solution, or use throat lozenges to sooth throat pain.    Change toothbrush after 48 hours of starting the antibiotic to prevent reinfection.    Watch for resolution of symptoms in the next few days. If you continue to have high fevers, begin to have difficulty swallowing or breathing, notice worsening neck pain, or difficulty moving neck, please return to clinic or present to the emergency room immediately. Otherwise, follow up with your primary care provider as needed.              Subjective:       Zaina Shaikh is a 54 y.o. female here for evaluation of ear pain and sore throat.  Onset of symptoms was 1 week ago with gradual worsening since then.  Patient now states that she woke up this morning with significant left ear discomfort.  She has a history of wearing hearing aids.  She otherwise has noticed some mild subjective fevers and a mild cough.  Patient has been taking NyQuil and Vicks VapoRub without improvement.    The following portions of the patient's history were reviewed and updated as appropriate: allergies, current medications and problem list.    Review of Systems  Pertinent items are noted in HPI.      Allergies  Allergies   Allergen Reactions     Lisinopril Cough       Family History   Problem Relation Age of Onset     Arthritis Mother      Heart disease Mother      Diabetes Mother      Hyperlipidemia Mother      Hypertension Mother      Nephrolithiasis Mother      Heart disease Father         Acute Anterolateral Myocardial Infarction     Diabetes Father      Hyperlipidemia Father      Heart attack Father      Diabetes Sister      Stroke Sister      Diabetes Brother      Colon cancer Paternal Grandmother 80        Adenocarcinoma of the large intestine     Hypertension Brother      Heart attack Brother      Stroke Sister      Diabetes Maternal Grandmother      Heart attack Paternal Grandfather      Sleep apnea Brother      No Medical Problems Sister      Breast cancer Neg Hx        Social History     Socioeconomic History     Marital status: Single     Spouse name: None     Number of children: 0     Years of education: None     Highest education level: None   Occupational History     Occupation:      Employer: [employer]   Social Needs     Financial resource strain: Not hard at all     Food insecurity:     Worry: Never true     Inability: Never true     Transportation needs:     Medical: No     Non-medical: No   Tobacco Use     Smoking status: Former Smoker     Packs/day: 0.50     Years: 32.00     Pack years: 16.00     Types: Cigarettes     Start date:      Last attempt to quit: 2015     Years since quittin.1     Smokeless tobacco: Former User     Quit date: 2015   Substance and Sexual Activity     Alcohol use: No     Comment: occasionally     Drug use: No     Sexual activity: Not Currently     Partners: Male     Birth control/protection: Post-menopausal   Lifestyle     Physical activity:     Days per week: 4 days     Minutes per session: 60 min     Stress: To some extent   Relationships     Social connections:     Talks on phone: None     Gets together: None      Attends Restorationism service: None     Active member of club or organization: None     Attends meetings of clubs or organizations: None     Relationship status: None     Intimate partner violence:     Fear of current or ex partner: None     Emotionally abused: None     Physically abused: None     Forced sexual activity: None   Other Topics Concern     None   Social History Narrative    Lives with mom         Objective:       /78 (Patient Site: Right Arm, Patient Position: Sitting, Cuff Size: Adult Large)   Pulse 81   Temp 98.2  F (36.8  C) (Oral)   Wt (!) 230 lb 1.6 oz (104.4 kg)   LMP 06/10/2017 (Approximate)   SpO2 99%   BMI 37.14 kg/m    General appearance: alert, appears stated age, cooperative, no distress and non-toxic  Head: Normocephalic, without obvious abnormality, atraumatic  Ears: Left: TM intact with purulent fluid and bulging, TM is injected, external canal appears erythematous and slightly swollen, no tenderness to tragus or auricle palpation.  Right: TM intact with purulent fluid and bulging, no erythema; external ear is not erythematous, no tenderness to tragus or auricle palpation  Nose: no discharge  Throat: Severe tonsillar swelling with erythema bilaterally and exudate, mucous membranes moist, lips and tongue normal  Neck: marked anterior cervical adenopathy and supple, symmetrical, trachea midline  Lungs: clear to auscultation bilaterally  Heart: regular rate and rhythm, S1, S2 normal, no murmur, click, rub or gallop

## 2021-06-06 NOTE — TELEPHONE ENCOUNTER
RN cannot approve Refill Request    RN can NOT refill this medication med is not covered by policy/route to provider     . Last office visit: 10/21/2019 Madisyn Stayc MD Last Physical: 5/6/2019 Last MTM visit: Visit date not found Last visit same specialty: 10/21/2019 Madisyn Stacy MD.  Next visit within 3 mo: Visit date not found  Next physical within 3 mo: Visit date not found      Savannah Manuel, [facility] Triage/Med Refill 3/14/2020    Requested Prescriptions   Pending Prescriptions Disp Refills     triamcinolone (KENALOG) 0.5 % cream [Pharmacy Med Name: TRIAMCINOLONE 0.5% CREAM 15GM] 30 g 6     Sig: APPLY SPARINGLY TO THE AFFECTED AREA AND MASSAGE IN TWICE DAILY       There is no refill protocol information for this order

## 2021-06-07 NOTE — PROGRESS NOTES
Chart reviewed by Ambulatory Quality and Data team    Please abstract the following data from this visit with this patient into the appropriate field in Epic:    Tests that can be patient reported without a hard copy:        Other Tests found in the patient's chart through Chart Review/Care Everywhere:    Eye exam with ophthalmology on this date: 1/11/2019 with documentation of no evidence of diabetic retinopathy.    Note to [employer]st[employer]: If this section is blank, no results were found via Chart Review/Care Everywhere.

## 2021-06-08 NOTE — TELEPHONE ENCOUNTER
Requested Prescriptions     Pending Prescriptions Disp Refills     amLODIPine (NORVASC) 10 MG tablet [Pharmacy Med Name: AMLODIPINE BESYLATE 10MG TABLETS] 90 tablet 3     Sig: TAKE 1 TABLET(10 MG) BY MOUTH DAILY

## 2021-06-11 NOTE — PROGRESS NOTES
[facility] Maintenance Exam  [facility]      Assessment/Plan     1.  Healthy female exam.   Health maintenance discussed as appropriate for age and risk factors including:  Nutrition, exercise, alcohol use, tobacco use, safe sexual practices, dental health.  Cancer screening assessed and ordered as indicated. Routine labs as outlined below based on age and risk factors reviewed today. Immunizations reviewed and updated.       1. Routine general medical examination at a health care facility  - [facility](CBC w/o Differential)  - Urinalysis-UC if Indicated    2. Type 2 diabetes mellitus without complication, without long-term current use of insulin (H)  Well  Controlled.  Addressed smoking status and aspirin therapy.  Recommended annual eye exam and dental cares. Reviewed foot cares and foot exam.  Blood pressure and lipid management reviewed today.  Vaccines reviewed and updated.  Plan for glucose management includes ongoing focus on healthy diabetic diet and increased activity, recheck in 6 months. Continue to work on LSM without meds.  Labs ordered as below including:     Lab Results   Component Value Date    HGBA1C 6.5 (H) 09/03/2020   , No results found for: LDL,   Lab Results   Component Value Date    CREATININE 0.77 05/06/2019       - Glycosylated Hemoglobin A1c  - Comprehensive Metabolic Panel  - Lipid Cascade  - Microalbumin, Random Urine    3. Benign Essential Hypertension  BP Readings from Last 3 Encounters:   09/03/20 131/79   01/07/20 135/78   10/21/19 122/66       well controlled today.  Plan for bloodpressure management includes ongoing focus on healthy DASH type diet and increased activity, encouraged to avoid tobacco products and limit alcohol use, stress reduction, continue amlodipine 10mg daily and losartan/hctz 100/25mg daily.  Labwork and meds ordered and reviewed as below  Lab Results   Component Value Date    K 4.0 05/06/2019      Lab Results   Component Value Date    CREATININE 0.77  2019       - Comprehensive Metabolic Panel  - Lipid Cascade    4. Obesity (BMI 35.0-39.9) with comorbidity (H)  Weight gain noted over past year related to deaths in family and decreased self cares. Pt committed to restarting exercise and weight watchers this fall.      5. Mixed hyperlipidemia  Continue statin- goal LDL <100  - Lipid Cascade    6. Heartburn  On PPI daily and not able to wean off at this time due ot ongoing but well controlled sx with omeprazole daily.  Check for anemia.    - HM2(CBC w/o Differential)      Return in about 6 months (around 3/3/2021) for Clinic visit.    Patient Education/AVS:  There are no Patient Instructions on file for this visit.    HPI:      Chief Complaint   Patient presents with     Annual Exam        Zaina Shaikh is a 54 y.o. female and is here for a comprehensive physical exam.     Other concerns today:   Mom  last winter and sister in law  this month suddenly of cancer.     Weight is up and notes diabetes numbers are up as well.  Was doing well on weight watchers until January.  Needs to get back to walking and exercise- Deidra classes are gone with Covid.      Eye exam due in   Hearing exam in   Dental exam this year    Has tried PPI every other day and symptomatic    Healthy Habits: Body mass index is 39.22 kg/m .  The following high BMI interventions were performed this visit: encouragement to exercise and lifestyle education regarding diet  Regular Exercise: limited right now due to covid and no more Deidra classes  Healthy Diet:at times- Weigh Watchers  Smoking:   Social History     Tobacco Use   Smoking Status Former Smoker     Packs/day: 0.50     Years: 32.00     Pack years: 16.00     Types: Cigarettes     Start date:      Last attempt to quit: 2015     Years since quittin.7   Smokeless Tobacco Former User     Quit date: 2015     Dental Visits Regularly: Yes  Eye exams: Yes  Pregnancy planning: na   Social History     Substance  and Sexual Activity   Sexual Activity Not Currently     Partners: Male     Birth control/protection: Post-menopausal     Seat Belt: Yes  Prevention of Osteoporosis:dairy in diet  Last Dexa:na    Do you take any herbs or supplements that were not prescribed by a doctor? no      Cancer Screening:  Hemoccults/Colonoscopy:  colonoscopy repeat 10 years  Mammogram:  - repeat 1-2 years  Pap Smear:  - repeat   Lung Cancer: na      Immunization History   Administered Date(s) Administered     DT (pediatric) 2005     Hep A, historic 2007, 2007     Hep B, Adult 10/11/2018, 2019, 2019     INFLUENZA,RECOMBINANT,INJ,PF QUADRIVALENT 18+YRS 10/21/2019, 2020     INFLUENZA,SEASONAL QUAD, PF, =/> 6months 10/11/2018     Pneumo Conj 13-V (2010&after) 2015     Pneumo Polysac 23-V 2017     Td, Adult, Absorbed 2017     Tdap 2007     ZOSTER, RECOMBINANT, IM 10/21/2019, 2019       OB History    Para Term  AB Living   0 0 0 0 0 0   SAB TAB Ectopic Multiple Live Births   0 0 0 0         Meds  Current Outpatient Medications on File Prior to Visit   Medication Sig Dispense Refill     acetaminophen (TYLENOL) 500 MG tablet        amLODIPine (NORVASC) 10 MG tablet TAKE 1 TABLET(10 MG) BY MOUTH DAILY 90 tablet 3     aspirin 81 MG EC tablet Take 81 mg by mouth daily.       atorvastatin (LIPITOR) 40 MG tablet TAKE 1 TABLET(40 MG) BY MOUTH AT BEDTIME 90 tablet 2     cholecalciferol, vitamin D3, (VITAMIN D3) 2,000 unit Tab        CONTOUR NEXT TEST STRIPS strips TEST ONCE DAILY AS NEEDED 100 strip 3     generic lancets Use 1 each As Directed daily. Dispense brand per patient's insurance at pharmacy discretion. 50 each 6     loratadine (CLARITIN) 10 mg tablet Take 10 mg by mouth daily.       losartan-hydrochlorothiazide (HYZAAR) 100-25 mg per tablet TAKE 1 TABLET BY MOUTH DAILY 90 tablet 0     omeprazole (PRILOSEC) 20 MG capsule TAKE 1 CAPSULE(20 MG) BY MOUTH  DAILY 90 capsule 2     triamcinolone (KENALOG) 0.5 % cream APPLY SPARINGLY TO THE AFFECTED AREA AND MASSAGE IN TWICE DAILY 30 g 6     [DISCONTINUED] tiZANidine (ZANAFLEX) 4 MG tablet        No current facility-administered medications on file prior to visit.        Past Medical History  Past Medical History:   Diagnosis Date     Nicotine abuse 3/12/2015     Sleep apnea        Surgical History  History reviewed. No pertinent surgical history.    Allergies  Lisinopril    Family History  Family History   Problem Relation Age of Onset     Arthritis Mother      Heart disease Mother      Diabetes Mother      Hyperlipidemia Mother      Hypertension Mother      Nephrolithiasis Mother      Heart disease Father         Acute Anterolateral Myocardial Infarction     Diabetes Father      Hyperlipidemia Father      Heart attack Father      Diabetes Sister      Stroke Sister      Diabetes Brother      Colon cancer Paternal Grandmother 80        Adenocarcinoma of the large intestine     Hypertension Brother      Heart attack Brother      Stroke Sister      Diabetes Maternal Grandmother      Heart attack Paternal Grandfather      Sleep apnea Brother      No Medical Problems Sister      Breast cancer Neg Hx        Social History  Social History     Socioeconomic History     Marital status: Single     Spouse name: Not on file     Number of children: 0     Years of education: Not on file     Highest education level: Not on file   Occupational History     Occupation: [place]     Employer: [employer]   Social Needs     Financial resource strain: Not hard at all     Food insecurity     Worry: Never true     Inability: Never true     Transportation needs     Medical: No     Non-medical: No   Tobacco Use     Smoking status: Former Smoker     Packs/day: 0.50     Years: 32.00     Pack years: 16.00     Types: Cigarettes     Start date:      Last attempt to quit: 2015     Years since quittin.7     Smokeless  "tobacco: Former User     Quit date: 2015   Substance and Sexual Activity     Alcohol use: No     Comment: occasionally     Drug use: No     Sexual activity: Not Currently     Partners: Male     Birth control/protection: Post-menopausal   Lifestyle     Physical activity     Days per week: 4 days     Minutes per session: 60 min     Stress: To some extent   Relationships     Social connections     Talks on phone: Not on file     Gets together: Not on file     Attends Restorationist service: Not on file     Active member of club or organization: Not on file     Attends meetings of clubs or organizations: Not on file     Relationship status: Not on file     Intimate partner violence     Fear of current or ex partner: No     Emotionally abused: No     Physically abused: No     Forced sexual activity: No   Other Topics Concern     Not on file   Social History Narrative    Lives alone in condo- mom  .           Review of Systems   Complete ROS including General, HEENT, CV, Pulmonary, GI, , Genital, Neuro, Psych, MSK, Heme, Endocrine all within normal except as noted in the HPI.      Objective:   /79 (Patient Site: Right Arm, Patient Position: Sitting, Cuff Size: Adult Large)   Pulse 82   Temp 98.4  F (36.9  C) (Oral)   Ht 5' 6\" (1.676 m)   Wt (!) 243 lb (110.2 kg)   LMP 06/10/2017 (Approximate)   BMI 39.22 kg/m   Body mass index is 39.22 kg/m .  Wt Readings from Last 3 Encounters:   20 (!) 243 lb (110.2 kg)   20 (!) 230 lb 1.6 oz (104.4 kg)   10/21/19 (!) 224 lb (101.6 kg)     No data recorded  No data recorded  PHQ-2 Total Score: 0 (9/3/2020  8:39 AM)      Complete physical exam including:  General, HEENT, Neck, breast, back, CV, Pulmonary, Abdominal, extremities, skin, neuro, psych, lymph, genital exams all within normal.    Abnormalities include:  All normal.  Pelvic exam deferred as pt is asymptomatic and not due for screening.  Breast exam deferred as pt had normal mammogram and " asymptomatic.       Results for orders placed or performed in visit on 09/03/20   Glycosylated Hemoglobin A1c   Result Value Ref Range    Hemoglobin A1c 6.5 (H) <=5.6 %

## 2021-06-11 NOTE — PROGRESS NOTES
Your cholesterol (LDL) should ideally be under 100.  I'm going to increase your atorvastatin from 40 to 80mg still once a day to help your body be as healthy as possible.     Your kidney and liver tests are healthy.    Normal blood counts- no anemia.   No urine infection.     Let me know if you have any questions.     Keep up the good work!    Madisyn Stacy

## 2021-06-11 NOTE — PROGRESS NOTES
[facility] Maintenance Exam  [facility]      Assessment/Plan     1.  Healthy female exam.   Health maintenance discussed as appropriate for age and risk factors including:  Nutrition, exercise, alcohol use, tobacco use, safe sexual practices, dental health.  Cancer screening assessed and ordered as indicated. Routine labs as outlined below based on age and risk factors reviewed today. Immunizations reviewed and updated.       1. Routine general medical examination at a health care facility  - Mammo Screening Bilateral; Future  - Td, Adult, Adsorbed (blue label)  - HM2(CBC w/o Differential)    2. Type 2 diabetes mellitus without complication, without long-term current use of insulin  Addressed smoking status and aspirin therapy.  Recommended annual eye exam and dental cares. Reviewed foot cares and foot exam.  Blood pressure and lipid management reviewed today.  Vaccines reviewed and updated.  Plan for glucose management includes ongoing focus on healthy diabetic diet and increased activity, continue off meds.  Labs ordered as below including:     Lab Results   Component Value Date    HGBA1C 6.9 (H) 06/08/2017   , No results found for: LDL,   Lab Results   Component Value Date    CREATININE 0.78 06/08/2017     - Comprehensive Metabolic Panel  - Glycosylated Hemoglobin A1c  - Lipid Cascade  - Pneumococcal polysaccharide vaccine 23-valent 1 yo or older, subq/IM    3. Vitamin D deficiency  Continue supplementation    4. Osteoarthrosis Of The Knee  Topical NSAIDs, cortisone injection, PT, ortho consult when needed.      5. Obesity  LSM    6. Mixed hyperlipidemia  LSM.  Continue statin  - Lipid [place]    7. Benign Essential Hypertension  Well controlled.  Continue losartan- hctz 50/12.5mg daily.    - Comprehensive Metabolic Panel    8. Visit for screening mammogram  - Mammo Screening Bilateral; Future    9. Chronic arthralgias of knees and hips  - Erythrocyte Sedimentation Rate  - Thyroid Cascade  -  Rheumatoid Factor Quant  - Lyme Antibody [place]    Return in about 4 months (around 10/8/2017).    Patient Education/AVS:  There are no Patient Instructions on file for this visit.    HPI:      Chief Complaint   Patient presents with     Annual Exam     joint pains would like to be tested        Zaina Shaikh is a 51 y.o. female and is here for a comprehensive physical exam.     Other concerns today:   Achy legs/knees- worse on the left.  Worse in morning 3/10.  Annoying.  Gets better during the day.  Icing knees. Able to do low impact Deidra.  Right upper arm gets sore when typing on the computer.  Uses biofreeze as needed    Mom was sick all winter and she didn't have time to get in to doctor as recommended.     Got a period in April and May.  Mild hot flashes.       Healthy Habits: Body mass index is 39.36 kg/(m^2).  The following high BMI interventions were performed this visit: encouragement to exercise and lifestyle education regarding diet  Regular Exercise: deidra 2-3x/week  Healthy Diet:nothing formal or regular- stressed out with mom's healthy problem  Smoking:   History   Smoking Status     Former Smoker     Packs/day: 0.25     Types: Cigarettes     Start date: 1983     Quit date: 12/2015   Smokeless Tobacco     Former User     Quit date: 12/26/2015     Comment: 1 cig per month     Dental Visits Regularly: Yes  Eye exams: No  Pregnancy planning: na   History   Sexual Activity     Sexual activity: No     Seat Belt: Yes  Prevention of Osteoporosis:Deidra, obese, limited dairy  Last Dexa:na    Do you take any herbs or supplements that were not prescribed by a doctor? no      Cancer Screening:  Hemoccults/Colonoscopy: normal colonoscopy 2014  Mammogram:  Due this year  Pap Smear:  Due 2021  Lung Cancer: na      Immunization History   Administered Date(s) Administered     DT (pediatric) 02/09/2005     Hep A, historic 03/28/2007, 09/26/2007     Pneumo Conj 13-V (2010&after) 05/19/2015     Pneumo Polysac 23-V  2017     Td, Adult, Absorbed 2017     Tdap 2007       OB History    Para Term  AB Living   0 0 0 0 0 0   SAB TAB Ectopic Multiple Live Births   0 0 0 0              Meds  Current Outpatient Prescriptions on File Prior to Visit   Medication Sig Dispense Refill     aspirin 81 MG EC tablet Take 81 mg by mouth daily.       cholecalciferol, vitamin D3, (VITAMIN D3) 2,000 unit Tab        losartan-hydrochlorothiazide (HYZAAR) 50-12.5 mg per tablet Take 1 tablet by mouth daily. 90 tablet 4     omeprazole (PRILOSEC) 20 MG capsule Take 1 capsule (20 mg total) by mouth daily. 90 capsule 4     simvastatin (ZOCOR) 80 MG tablet Take 1 tablet (80 mg total) by mouth once daily. 30 tablet 11     triamcinolone (KENALOG) 0.5 % cream APPLY SPARINGLY TO THE AFFECTED AREA AND MASSAGE IN TWICE DAILY. 30 g 2     GLUC IGLESIAS/CHONDRO IGLESIAS A/VIT C/MN (GLUCOSAMINE 1500 COMPLEX ORAL) 1 capsule daily.       No current facility-administered medications on file prior to visit.        Past Medical History  Past Medical History:   Diagnosis Date     Nicotine abuse 3/12/2015       Surgical History  No past surgical history on file.    Allergies  Lisinopril    Family History  Family History   Problem Relation Age of Onset     Arthritis Mother      Heart disease Mother      Diabetes Mother      Hyperlipidemia Mother      Kidney disease Mother      nephrolithiasis, uric acid stones     Hypertension Mother      Heart disease Father      Acute Anterolateral Myocardial Infarction     Diabetes Father      Hyperlipidemia Father      Diabetes Sister      Stroke Sister      Diabetes Brother      GI problems Paternal Grandmother      Adenocarcinoma of the large intestine     Heart disease Brother      Benign Essential Hypertension     Heart attack Brother      Breast cancer Neg Hx        Social History  Social History     Social History     Marital status: Single     Spouse name: N/A     Number of children: 0     Years of education:  "N/A     Occupational History      [employer]     Social History Main Topics     Smoking status: Former Smoker     Packs/day: 0.25     Types: Cigarettes     Start date: 1983     Quit date: 12/2015     Smokeless tobacco: Former User     Quit date: 12/26/2015      Comment: 1 cig per month     Alcohol use No     Drug use: No     Sexual activity: No     Other Topics Concern     Not on file     Social History Narrative    Lives with mom         Review of Systems   Complete ROS including General, HEENT, CV, Pulmonary, GI, , Genital, Neuro, Psych, MSK, Heme, Endocrine all within normal except as noted in the HPI.      Objective:   /88 (Patient Site: Right Arm, Patient Position: Sitting, Cuff Size: Adult Large)  Pulse 92  Temp 97.7  F (36.5  C) (Oral)   Resp 20  Ht 5' 5.75\" (1.67 m)  Wt (!) 242 lb 0.6 oz (109.8 kg)  LMP 05/11/2017  BMI 39.36 kg/m2 Body mass index is 39.36 kg/(m^2).  Wt Readings from Last 3 Encounters:   06/08/17 (!) 242 lb 0.6 oz (109.8 kg)   08/25/16 (!) 241 lb (109.3 kg)   05/26/16 (!) 239 lb (108.4 kg)     No Data Recorded  No Data Recorded  PHQ-2 Total Score: 0 (6/8/2017  9:00 AM)    Complete physical exam including:  General, HEENT, Neck, back, CV, Pulmonary, Abdominal, extremities, skin, neuro, psych, lymph all within normal.    Abnormalities include:  No pelvic exam b/c asymptomatic  Wearing hearing aids  Declined breast exam     Results for orders placed or performed in visit on 06/08/17   Comprehensive Metabolic Panel   Result Value Ref Range    Sodium 140 136 - 145 mmol/L    Potassium 3.9 3.5 - 5.0 mmol/L    Chloride 103 98 - 107 mmol/L    CO2 27 22 - 31 mmol/L    Anion Gap, Calculation 10 5 - 18 mmol/L    Glucose 110 70 - 125 mg/dL    BUN 14 8 - 22 mg/dL    Creatinine 0.78 0.60 - 1.10 mg/dL    GFR MDRD Af Amer >60 >60 mL/min/1.73m2    GFR MDRD Non Af Amer >60 >60 mL/min/1.73m2    Bilirubin, Total 0.6 0.0 - 1.0 mg/dL    Calcium 9.4 8.5 - 10.5 mg/dL    " Protein, Total 7.1 6.0 - 8.0 g/dL    Albumin 3.9 3.5 - 5.0 g/dL    Alkaline Phosphatase 63 45 - 120 U/L    AST 22 0 - 40 U/L    ALT 20 0 - 45 U/L   Glycosylated Hemoglobin A1c   Result Value Ref Range    Hemoglobin A1c 6.9 (H) 3.5 - 6.0 %   Lipid Cascade   Result Value Ref Range    Cholesterol 137 <=199 mg/dL    Triglycerides 79 <=149 mg/dL    HDL Cholesterol 40 (L) >=50 mg/dL    LDL Calculated 81 <=129 mg/dL    Patient Fasting > 8hrs? Yes    Erythrocyte Sedimentation Rate   Result Value Ref Range    Sed Rate 14 0 - 20 mm/hr   Thyroid Cascade   Result Value Ref Range    TSH 1.95 0.30 - 5.00 uIU/mL   Rheumatoid Factor Quant   Result Value Ref Range    Rheumatoid Factor Quantitative 18.4 0 - 30 IU/mL   Lyme Antibody Cascade   Result Value Ref Range    Lyme Antibody Cascade 0.17 <0.90 Index Value   HM2(CBC w/o Differential)   Result Value Ref Range    WBC 5.9 4.0 - 11.0 thou/uL    RBC 4.76 3.80 - 5.40 mill/uL    Hemoglobin 12.8 12.0 - 16.0 g/dL    Hematocrit 38.6 35.0 - 47.0 %    MCV 81 80 - 100 fL    MCH 27.0 27.0 - 34.0 pg    MCHC 33.2 32.0 - 36.0 g/dL    RDW 13.2 11.0 - 14.5 %    Platelets 231 140 - 440 thou/uL    MPV 10.5 (H) 7.0 - 10.0 fL

## 2021-06-14 NOTE — PROGRESS NOTES
[facility] Office Visit    Chief Complaint:  Chief Complaint   Patient presents with     Follow-up     Diabetes     Consult     Discuss metformin         Assessment/Plan:  1. Type 2 diabetes mellitus without complication, without long-term current use of insulin  Addressed smoking status and aspirin therapy.  Recommended annual eye exam and dental cares. Reviewed foot cares and foot exam.  Blood pressure and lipid management reviewed today.  Vaccines reviewed and updated.  Plan for glucose management includes ongoing focus on healthy diabetic diet and increased activity, continue off medications based on her glucose readings reviewed today.  F/u in 3 months to recheck labs and start meds if A1c still over 7.  Labs ordered as below including:     Lab Results   Component Value Date    HGBA1C 7.4 (H) 09/13/2017   , No results found for: LDL,   Lab Results   Component Value Date    CREATININE 0.81 09/13/2017         2. Mixed hyperlipidemia  Try going off lipitor x 1 month to see if this help with her body aches.  If better then restart at 1/2 dose.      3. Benign Essential Hypertension  BP Readings from Last 3 Encounters:   12/06/17 130/80   09/13/17 132/88   06/08/17 130/88       well controlled today.  Plan for bloodpressure management includes ongoing focus on healthy DASH type diet and increased activity, encouraged to avoid tobacco products and limit alcohol use, stress reduction, continue losartan hctz 50/12.5mg daily.  Labwork and meds ordered and reviewed as below  Lab Results   Component Value Date    K 4.5 09/13/2017      Lab Results   Component Value Date    CREATININE 0.81 09/13/2017         Return in about 3 months (around 3/6/2018) for Recheck.  The following high BMI interventions were performed this visit: encouragement to exercise and lifestyle education regarding diet    Patient Education/AVS:  Patient Instructions   Ok to stop lipitor for a month to see if you leg cramps get  better  Okay to cut lipitor in 1/2 to see if this helps    Be sure to push your fluids and stretch    Low Iron, potassium, magnesium, calcium can all cause leg cramps    No need for metformin at this time      HPI:   Zaina Shaikh is a 52 y.o. female c/o f/u on diabetes and possibly starting metformin.  Pt dx with diabetes in past several months but was resistant to starting meds or meeting with diabetes educator.  Did get a glucose meter and has started checking her sugars and trying to change her diet and exercise to balance out her sugars.  Has lost 6 pounds.  Overall her sugars have been pretty good- only 3 days with fasting sugars over 130s and in the evenings a couple hours after eating around 7:30 her sugars 130-160s.  Finds that extra helping of food at a meal or a sweet treat will make her sugars high.  Has been counting carbs and taking 3-4/meal.  Has not started her metformin and would like to wait as long as possible.     Fasting postmeal evening  12/6/17 122   12/5 127  141  12/4 120  159  12/3 121  147  12/2 110  12/1 117  147  11/30 124  154  11/29 121  143     Has been working with ortho and found she has a bone spur in her right shoulder but not ready for surgery.  Did get a cortisone shot with ortho and it helped but having pain on heavy  days.  Can repeat cortisone shot soon.      Has continued with patti 3x/week but has noted since starting her cholesterol pill that she is getting more muscle aches in her groin and her calves.  Workout is the same as usual.  Started lipitor 40mg at her last visit mid September and started noted increased achiness in past 2-3 weeks.     No period in past 6 months.      ROS:  Constitutional, CV, Resp, GI, , MSK, skin, neuro, psych all negative except as outlined in the HPI above.    Lab tests reviewed-1: 2017    Physical Exam:  /80 (Patient Site: Left Arm, Patient Position: Sitting, Cuff Size: Adult Large)  Pulse 72  Temp 97.3  F (36.3  C)  (Oral)   Wt (!) 237 lb (107.5 kg)  LMP 06/06/2016  BMI 38.54 kg/m2 Body mass index is 38.54 kg/(m^2). Patient's last menstrual period was 06/06/2016.  Vital signs reviewed  Wt Readings from Last 3 Encounters:   12/06/17 (!) 237 lb (107.5 kg)   09/13/17 (!) 243 lb (110.2 kg)   06/08/17 (!) 242 lb 0.6 oz (109.8 kg)     History   Smoking Status     Former Smoker     Packs/day: 0.25     Types: Cigarettes     Start date: 1983     Quit date: 12/2015   Smokeless Tobacco     Former User     Quit date: 12/26/2015     Comment: 1 cig per month     History   Sexual Activity     Sexual activity: No     No Data Recorded  No Data Recorded  PHQ-2 Total Score: 0 (6/8/2017  9:00 AM)  No Data Recorded    All normal as below except abnormalities include: all normal  General is a  52 y.o. female sitting comfortably in no apparent distress.   Neck: Supple without lymphadenopathy or thyromegally  CV: Regular rate and rhythm S1S2 without rubs, murmurs or gallops,   Lungs: Clear to auscultation bilaterally  Extremities: Warm, No Edema, 2+ Pedal and radial pulses bilaterally  Skin: No lesions or rashes noted  Neuro/MSK: Able to ambulate around the exam room with equal movement, strength and normal coordination of the upper and lower extremeties symmetrically    Results for orders placed or performed in visit on 09/13/17   Glycosylated Hemoglobin A1c   Result Value Ref Range    Hemoglobin A1c 7.4 (H) 3.5 - 6.0 %   Microalbumin, Random Urine   Result Value Ref Range    Microalbumin, Random Urine 0.92 0.00 - 1.99 mg/dL    Creatinine, Urine 193.4 mg/dL    Microalbumin/Creatinine Ratio Random Urine 4.8 <=19.9 mg/g   Basic Metabolic Panel   Result Value Ref Range    Sodium 142 136 - 145 mmol/L    Potassium 4.5 3.5 - 5.0 mmol/L    Chloride 103 98 - 107 mmol/L    CO2 30 22 - 31 mmol/L    Anion Gap, Calculation 9 5 - 18 mmol/L    Glucose 113 70 - 125 mg/dL    Calcium 9.8 8.5 - 10.5 mg/dL    BUN 18 8 - 22 mg/dL    Creatinine 0.81 0.60 - 1.10 mg/dL     GFR MDRD Af Amer >60 >60 mL/min/1.73m2    GFR MDRD Non Af Amer >60 >60 mL/min/1.73m2   Follicle Stimulating Hormone (FSH)   Result Value Ref Range    FSH 38.9 mIU/mL   Thyroid Cascade   Result Value Ref Range    TSH 2.58 0.30 - 5.00 uIU/mL       Med list and active problem list reviewed and updated as part of this encounter    Current Outpatient Prescriptions on File Prior to Visit   Medication Sig Dispense Refill     aspirin 81 MG EC tablet Take 81 mg by mouth daily.       atorvastatin (LIPITOR) 40 MG tablet Take 1 tablet (40 mg total) by mouth at bedtime. 30 tablet 11     blood glucose test strips Use 1 each As Directed daily. Dispense brand per patient's insurance at pharmacy discretion. 50 strip 6     cholecalciferol, vitamin D3, (VITAMIN D3) 2,000 unit Tab        generic lancets Use 1 each As Directed daily. Dispense brand per patient's insurance at pharmacy discretion. 50 each 6     loratadine (CLARITIN) 10 mg tablet Take 10 mg by mouth daily.       losartan-hydrochlorothiazide (HYZAAR) 50-12.5 mg per tablet Take 1 tablet by mouth daily. 90 tablet 0     omeprazole (PRILOSEC) 20 MG capsule Take 1 capsule (20 mg total) by mouth daily. 90 capsule 4     triamcinolone (KENALOG) 0.5 % cream APPLY SPARINGLY TO THE AFFECTED AREA AND MASSAGE IN TWICE DAILY. 30 g 2     No current facility-administered medications on file prior to visit.          Madisyn Stacy MD

## 2021-06-16 PROBLEM — M67.921 BICEPS TENDINOPATHY, RIGHT: Status: ACTIVE | Noted: 2017-09-13

## 2021-06-16 PROBLEM — G47.33 OSA (OBSTRUCTIVE SLEEP APNEA): Status: ACTIVE | Noted: 2018-10-11

## 2021-06-16 PROBLEM — E66.01 MORBID OBESITY (H): Status: ACTIVE | Noted: 2018-10-11

## 2021-06-16 PROBLEM — R23.2 HOT FLASHES: Status: ACTIVE | Noted: 2017-09-13

## 2021-06-16 PROBLEM — Z82.49 FAMILY HISTORY OF ATHEROSCLEROSIS: Status: ACTIVE | Noted: 2019-06-28

## 2021-06-16 NOTE — PROGRESS NOTES
[facility] IN-OFFICE Visit  Phone : none    Chief Complaint:  Chief Complaint   Patient presents with     Diabetes       Assessment/Plan:  1. Morbid obesity (H)  Weight gain noted.  Will start LSM now that she is getting covid vaccine and improved weather.      2. Type 2 diabetes mellitus without complication, without long-term current use of insulin (H)  Well  Controlled.  Addressed smoking status and aspirin therapy.  Recommended annual eye exam and dental cares. Reviewed foot cares and foot exam.  Blood pressure and lipid management reviewed today.  Vaccines reviewed and updated.  Plan for glucose management includes ongoing focus on healthy diabetic diet and increased activity, conitnue without meds and follow-up in 6 months.  Labs ordered as below including:     Lab Results   Component Value Date    HGBA1C 6.9 (H) 03/26/2021   , No results found for: LDL,   Lab Results   Component Value Date    CREATININE 0.75 09/03/2020       - Lipid Cascade  - Glycosylated Hemoglobin A1c  - Basic Metabolic Panel    3. Benign Essential Hypertension  BP Readings from Last 3 Encounters:   03/26/21 132/82   09/03/20 131/79   01/07/20 135/78       well controlled today.  Plan for bloodpressure management includes ongoing focus on healthy DASH type diet and increased activity, encouraged to avoid tobacco products and limit alcohol use, stress reduction, continue losartan/hctz 100/25mg daily and amlodipine 10mg daily along with LSM.  Labwork and meds ordered and reviewed as below  Lab Results   Component Value Date    K 4.2 09/03/2020      Lab Results   Component Value Date    CREATININE 0.75 09/03/2020       - Basic Metabolic Panel    4. Mixed hyperlipidemia  Continue lipitor 80mg daily recently increased after last visit as ldl was over 100.  - Lipid Cascade    5. Hip pain, left  likley OA based on exam and symptoms today.  Xray today for further evaluation and pt likely next.    - XR Hip Left 2  or More VWS; Future      Return in about 6 months (around 2021) for Annual physical.  The following high BMI interventions were performed this visit: encouragement to exercise and lifestyle education regarding diet    Patient Education/AVS:  Patient Instructions       HPI:   Zaina Shaikh is a 55 y.o. female and presents to clinic today for the following health issues follow-up on diabetes.  Was going to really take care of herself since last visit and really hasn't.      Fasting this week 165  Due for eye exam.  Did complete her hearing and dental checkup.        Back at gym but only able to use the machines for 10-15 minutes before she gets pain in left leg.  Achy at night, better with tylenol.  Worried about a blocked vein, etc.  Wondering about arthritis. Gets hip/groin pain.  Very sedentary at work  2-3hrs at a time.      Haven't been checking her sugars.  Still a bit depressed about death of her mom along with covid isolation, etc.     Old Records-1: Outside allergies, meds, problems and immunizations were reconciled as needed from [facility]  Lab tests reviewed-2020    Social History     Social History Narrative    Lives alone in [facility]- mom  .         Physical Exam:  /82 (Patient Site: Left Arm, Patient Position: Sitting, Cuff Size: Adult Large)   Pulse 69   Resp 16   Wt (!) 249 lb (112.9 kg)   LMP 06/10/2017 (Approximate)   SpO2 99%   BMI 40.19 kg/m   Body mass index is 40.19 kg/m . Patient's last menstrual period was 06/10/2017 (approximate).  Vital signs reviewed  Wt Readings from Last 3 Encounters:   21 (!) 249 lb (112.9 kg)   20 (!) 243 lb (110.2 kg)   20 (!) 230 lb 1.6 oz (104.4 kg)     No data recorded  No data recorded  PHQ-2 Total Score: 0 (9/3/2020  8:39 AM)    No data recorded    All normal as below except abnormalities include: grossly normal exam.  No limp. Some pain in left groin with rotation.  No IT band pain.  No pain of Sciatic  notch.    General is a  55 y.o. female sitting comfortably in no apparent distress wearing a mask.  HEENT:  Eye exam normal   Neck: Supple without lymphadenopathy or thyromegaly  CV: Regular rate and rhythm S1S2 without rubs, murmurs or gallops,   Lungs: Clear to auscultation bilaterally  Abd:  +BS, soft NT/ND,  No masses or organomegaly  Extremities: Warm, No Edema, 2+ Pedal and radial pulses bilaterally  Skin: No lesions or rashes noted  Neuro/MSK: Able to ambulate around the exam room with equal movement, strength and normal coordination of the upper and lower extremeties symmetrically    Madisyn Stacy MD  [facility]

## 2021-06-16 NOTE — TELEPHONE ENCOUNTER
Refill Approved    Rx renewed per Medication Renewal Policy. Medication was last renewed on 6/29/20.    Brien Youngblood, [facility] Triage/Med Refill 4/2/2021     Requested Prescriptions   Pending Prescriptions Disp Refills     omeprazole (PRILOSEC) 20 MG capsule [Pharmacy Med Name: OMEPRAZOLE 20MG CAPSULES] 90 capsule 2     Sig: TAKE 1 CAPSULE(20 MG) BY MOUTH DAILY       GI Medications Refill Protocol Passed - 3/31/2021  8:06 PM        Passed - PCP or prescribing provider visit in last 12 or next 3 months.     Last office visit with prescriber/PCP: Visit date not found OR same dept: 3/26/2021 Madisyn Stacy MD OR same specialty: 3/26/2021 Madisyn Stacy MD  Last physical: Visit date not found Last MTM visit: Visit date not found   Next visit within 3 mo: Visit date not found  Next physical within 3 mo: Visit date not found  Prescriber OR PCP: Storm Garcia MD  Last diagnosis associated with med order: 1. GERD (gastroesophageal reflux disease)  - omeprazole (PRILOSEC) 20 MG capsule [Pharmacy Med Name: OMEPRAZOLE 20MG CAPSULES]; TAKE 1 CAPSULE(20 MG) BY MOUTH DAILY  Dispense: 90 capsule; Refill: 2    If protocol passes may refill for 12 months if within 3 months of last provider visit (or a total of 15 months).

## 2021-06-16 NOTE — PROGRESS NOTES
[facility] Office Visit    Chief Complaint:  Chief Complaint   Patient presents with     Follow-up     diabetes, hypertension         Assessment/Plan:  1. Type 2 diabetes mellitus without complication, without long-term current use of insulin  Addressed smoking status and aspirin therapy.  Recommended annual eye exam and dental cares. Reviewed foot cares and foot exam.  Blood pressure and lipid management reviewed today.  Vaccines reviewed and updated.  Plan for glucose management includes ongoing focus on healthy diabetic diet and increased activity.  No need for meds at this point.  Continue to monitor daily with glucose meter and p0aloygg in clinic.  Labs ordered as below including:     Lab Results   Component Value Date    HGBA1C 6.9 (H) 03/06/2018   , No results found for: LDL,   Lab Results   Component Value Date    CREATININE 0.81 09/13/2017       - Glycosylated Hemoglobin A1c  - Basic Metabolic Panel    2. Mixed hyperlipidemia  Continue lipitor 40mg daily and f/u in 3 months for CPE and fasting labs    3. Benign Essential Hypertension  BP Readings from Last 3 Encounters:   03/06/18 (!) 142/98   12/06/17 130/80   09/13/17 132/88       uncontrolled today.  Plan for bloodpressure management includes ongoing focus on healthy DASH type diet and increased activity, encouraged to avoid tobacco products and limit alcohol use, stress reduction, increase losartan/hctz to 100/12.5mg daily and recheck in 3 months.  Labwork and meds ordered and reviewed as below  Lab Results   Component Value Date    K 4.5 09/13/2017      Lab Results   Component Value Date    CREATININE 0.81 09/13/2017       - Basic Metabolic Panel  - losartan-hydrochlorothiazide (HYZAAR) 100-12.5 mg per tablet; Take 1 tablet by mouth daily.  Dispense: 90 tablet; Refill: 4      Return in about 3 months (around 6/6/2018) for Annual physical.  The following high BMI interventions were performed this visit: encouragement to exercise and  lifestyle education regarding diet  I have counseled the patient for tobacco cessation and the follow up will occur  at the next visit.    Patient Education/AVS:  Patient Instructions   Take 2 bp meds/day until they are gone and when you get your new bottle it will just be one a day      HPI:   Zaina Shaikh is a 52 y.o. female c/o f/u on her high blood pressure, diabetes and leg cramps.      Stopped the lipitor and the leg cramps got better.  Restarted her lipitor again in January at same dose 40mg and the pain never came back.  Pt notes that a certain pair of shoes may have caused the problems.      Overall doing well on her diet and exercise.  Finds using her glucose meter helps her not overeat or pass on cravings.  Hardly ever eating sweets at this point.  Going to "[employer] 3x/week and staying active around the house.  Fruits/veggies daily.  Knows about weight watcher meals that she can eat.  Eating more tuna fish.  Using fruit for her sweet treat as needed.  Yogurt for treats.      Did bring in glucose meter:  Fasting sugars daily reviewed :110-130s    2-3hrs after eating 130-180    Sister just had a stroke but does not have diabetes.      ROS:  Constitutional, CV, Resp, GI, , MSK, skin, neuro, psych all negative except as outlined in the HPI above.    Lab tests reviewed-1: 2017    Physical Exam:  BP (!) 142/98 (Patient Site: Right Arm, Patient Position: Sitting, Cuff Size: Adult Large)  Pulse 80  Resp 20  Wt (!) 239 lb (108.4 kg)  BMI 38.87 kg/m2 Body mass index is 38.87 kg/(m^2). No LMP recorded. Patient is not currently having periods (Reason: Premenopausal).  Vital signs reviewed  Wt Readings from Last 3 Encounters:   03/06/18 (!) 239 lb (108.4 kg)   12/06/17 (!) 237 lb (107.5 kg)   09/13/17 (!) 243 lb (110.2 kg)     History   Smoking Status     Former Smoker     Packs/day: 0.25     Types: Cigarettes     Start date: 1983     Quit date: 12/2015   Smokeless Tobacco     Former User     Quit date:  12/26/2015     Comment: 1 cig per month     History   Sexual Activity     Sexual activity: No     No Data Recorded  No Data Recorded  PHQ-2 Total Score: 0 (6/8/2017  9:00 AM)  No Data Recorded    All normal as below except abnormalities include: all normal  General is a  52 y.o. female sitting comfortably in no apparent distress.   Neck: Supple without lymphadenopathy or thyromegally  CV: Regular rate and rhythm S1S2 without rubs, murmurs or gallops,   Lungs: Clear to auscultation bilaterally  Extremities: Warm, No Edema, 2+ Pedal and radial pulses bilaterally  Skin: No lesions or rashes noted  Neuro/MSK: Able to ambulate around the exam room with equal movement, strength and normal coordination of the upper and lower extremeties symmetrically    Results for orders placed or performed in visit on 03/06/18   Glycosylated Hemoglobin A1c   Result Value Ref Range    Hemoglobin A1c 6.9 (H) 3.5 - 6.0 %       Med list and active problem list reviewed and updated as part of this encounter    Current Outpatient Prescriptions on File Prior to Visit   Medication Sig Dispense Refill     aspirin 81 MG EC tablet Take 81 mg by mouth daily.       atorvastatin (LIPITOR) 40 MG tablet Take 1 tablet (40 mg total) by mouth at bedtime. 30 tablet 11     blood glucose test strips Use 1 each As Directed daily. Dispense brand per patient's insurance at pharmacy discretion. 50 strip 6     cholecalciferol, vitamin D3, (VITAMIN D3) 2,000 unit Tab        generic lancets Use 1 each As Directed daily. Dispense brand per patient's insurance at pharmacy discretion. 50 each 6     loratadine (CLARITIN) 10 mg tablet Take 10 mg by mouth daily.       omeprazole (PRILOSEC) 20 MG capsule TAKE 1 CAPSULE(20 MG) BY MOUTH DAILY 90 capsule 3     triamcinolone (KENALOG) 0.5 % cream APPLY SPARINGLY TO THE AFFECTED AREA AND MASSAGE IN TWICE DAILY. 30 g 2     [DISCONTINUED] losartan-hydrochlorothiazide (HYZAAR) 50-12.5 mg per tablet TAKE 1 TABLET BY MOUTH DAILY 90  tablet 1     No current facility-administered medications on file prior to visit.          Madisyn Stacy MD

## 2021-06-16 NOTE — PROGRESS NOTES
[facility]   Hip Initial Evaluation    Patient Name: Zaina Shaikh  Date of evaluation: 4/13/2021  Referral Diagnosis: Primary osteoarthritis of both hips  Referring provider: Madisyn Stacy MD  Visit Diagnosis:     ICD-10-CM    1. Bilateral primary osteoarthritis of hip  M16.0    2. Generalized muscle weakness  M62.81        Assessment:        Zaina Shaikh is a 55 y.o. female who presents to therapy today with chief complaints of B hip pain, more pain on the left side. Onset date of sx was 3/26/21, worsening hip pain noticeable in the last 6 months.  Pt reported h/o chronic bilateral knee and hip pain, no traumatic events.  Pain symptoms are described as achy and are exacerbated by prolonged sitting.  Functional impairments include stiffness after sitting for long periods of time, decrease ability to walk > 1/2 mile.  Pt demo's signs and sx consistent with bilateral hip osteoarthritis and lower extremity weakness.   Skilled PT is required to increase bilateral hip strength, decrease hip pain, and [facility] an improved quality of life and healthy lifestyle.     Goals:  Pt. will be independent with home exercise program in : 4 weeks    Pt will: be able to walk 1-2 miles in 6 weeks with reports of hip pain <4/10  Pt will: increase strength in bilateral hip flexion and abduction to 4/5 MMT to decrease pain and allow for return to recreational activities      Patient's expectations/goals are realistic.    Barriers to Learning or Achieving Goals:  No Barriers. Expressed interest in only doing a few sessions depending on insurance coverage.        Plan / Patient Instructions:        Plan of Care:   Communication with: Referral Source  Patient Related Instruction: Nature of Condition;Body mechanics;Posture;Treatment plan and rationale;Precautions;Next steps;Self Care instruction;Basis of treatment;Expected outcome  Times per Week: 1  Number of Weeks:  6  Number of Visits: 6  Precautions / Restrictions : when patient met goals, when patient progress plateaus, when patient can independently manage diagnosis.    POC was discussed, HEP was provided and educated upon.   Treatment techniques, plan of care, and goals were discussed with the patient.  The patient agrees to the plan as outlined.  The plan of care is dynamic and will be modified on an ongoing basis.    Plan for next visit: review HEP, progress hip strengthening/stretching, STM to hip musculature as needed      Subjective:        Social information:   Living Situation:condo   Occupation: desk job, full-time   Work Status:Working full time   Equipment Available: None    Pain Rating:3  Pain rating at best: 1  Pain rating at worst: 9  Pain description: aching    Functional limitations are described as occurring with:   increase pain with sitting for prolonged periods, sitting in low surfaces, walking for more than 1/2 mile, difficulty to exercise due to increased hip pain at times     Patient reports benefit from:  anti-inflammatory, heat, biofreeze (only a little help), pain worsens with exercises, sitting slumped makes pain worse.        Objective:      Note: Items left blank indicates the item was not performed or not indicated at the time of the evaluation.    Patient Outcome Measures :    Not measured at this time.     Hip Examination   1. Bilateral primary osteoarthritis of hip     2. Generalized muscle weakness       Precautions/Restrictions: None  Involved Side: Bilateral  Gait Observation: lateral sway, slight bilateral trendelenburg indicating B hip weakness   Lumbar Clearing: Does not provoke symptoms    Hip ROM:    Date: 4/13/2021     Hip ROM( ) AROM in degrees AROM in degrees AROM in degrees    Right Left Right Left Right Left   Hip Flexion (0-120 )         Hip Abduction (0-45 )         Hip External Rotation (0-50 )         Hip Internal Rotation (0-40 )         Hip Extension (0-15 )          PROM  in degrees PROM in degrees PROM in degrees    Right Left Right Left Right Left   Hip Flexion (0-120 ) 115 deg and painful 115 deg and painful       Hip Abduction (0-45 ) 40 deg 40 deg       Hip External Rotation (0-50 ) 40 deg 40 deg       Hip Internal Rotation (0-40 ) 15 deg 10 deg       Hip Extension (0-15 ) 10 deg 10 deg          Hip/Knee Strength     Date: 4/13/2021     Hip/Knee Strength (/5) MMT MMT MMT    Right Left Right Left Right Left   Hip Flexion 3 3-       Hip Abduction 3 3-       Hip Adduction 5 5       Hip Extension 2+ 2+       Hip External Rotation         Hip Internal Rotation         Knee Extension 5 5       Knee Flexion 5 5         Ankle/Foot Strength (/5) MMT MMT MMT    Right Left Right Left Right Left   Dorsiflexion 5 5          Flexibility: B hip flexor tightness with bilateral positive David test   Palpation: tenderness with gluteus medius palpation, no pain on bony landmarks      Hip Special Tests     OA Right (+/-) Left (+/-) Intra Articular Right (+/-) Left (+/-)   Hip Scour + + RALPH - -   Test Cluster  -Hip pain  -Hip IR <15   -Hip Flex <115  + + FADIR + +   Test Cluster  -Painful Hip IR  ->50 years old  -Morning Stiffness <60 min + + Passive Supine Rotation Test     Misc. Right (+/-) Left (+/-) Stinchfield Test (SLR Against Resistance)     Ely s - - DEXRIT     Corin s   DIRI     Trendelenburg + + Posterior Rim Impingement     SIJ Right (+/-) Left (+/-) Lateral Rim Impingement     SIJ Compression - - Other     SIJ Distraction - - Other     POSH Test   Other     Sacral Thrust - - Other       Appt time: 3:15 - 4:15 pm    Treatment Today     TREATMENT MINUTES COMMENTS   Evaluation 20 -Patient educated on pathology  -Discussed POC   Self-care/ Home management     Manual therapy 10 STM to bilateral gluteal medius musculature, edu for use of tennis ball/soup can for self tissue mobilization against wall.    Neuromuscular Re-education     Therapeutic Activity     Therapeutic Exercises 25  -Demo/performance of HEP  Exercise #1: Bridge x 10 reps  Comment #1: clamshells x 10 reps each side, cues for keeping hips stacked  Exercise #2: S/L hip abduction x 10 reps each side, cues for keeping hips stacked  Comment #2: SLR x 10 reps each side  Exercise #3: standing hip flexor stretch x 2 reps, 20 sec holds each side with arm raise for increased stretch     Gait training     Modality__________________                Total 55    Blank areas are intentional and mean the treatment did not include these items.     PT Evaluation Code: (Please list factors)  Patient History/Comorbidities: see above  Examination: see above  Clinical Presentation: stable  Clinical Decision Making: low    Patient History/  Comorbidities Examination  (body structures and functions, activity limitations, and/or participation restrictions) Clinical Presentation Clinical Decision Making (Complexity)   No documented Comorbidities or personal factors 1-2 Elements Stable and/or uncomplicated Low   1-2 documented comorbidities or personal factor 3 Elements Evolving clinical presentation with changing characteristics Moderate   3-4 documented comorbidities or personal factors 4 or more Unstable and unpredictable High          Madisyn Malone, PT, DPT  4/13/2021  3:13 PM

## 2021-06-18 NOTE — PATIENT INSTRUCTIONS - HE
Patient Instructions by Madisyn Stacy MD at 3/26/2021  9:20 AM     Author: Madisyn Stacy MD Service: -- Author Type: Physician    Filed: 3/26/2021 10:20 AM Encounter Date: 3/26/2021 Status: Addendum    : Madisyn Stacy MD (Physician)    Related Notes: Original Note by Madisyn Stacy MD (Physician) filed at 3/26/2021  9:22 AM

## 2021-06-18 NOTE — PROGRESS NOTES
"[facility] Health Maintenance Exam  [facility]      Assessment/Plan     1.  Healthy female exam.   Health maintenance discussed as appropriate for age and risk factors including:  Nutrition, exercise, alcohol use, tobacco use, safe sexual practices, dental health.  Cancer screening assessed and ordered as indicated. Routine labs as outlined below based on age and risk factors reviewed today. Immunizations reviewed and updated.       1. Type 2 diabetes mellitus without complication, without long-term current use of insulin  Well controlled without medications.  Continue to work on LSM.    - Glycosylated Hemoglobin A1c    2. Routine general medical examination at a health care facility  - Lipid Cascade  - Comprehensive Metabolic Panel  - Hepatitis C Antibody (Anti-HCV)  - Hepatitis B Surface Antibody (Anti-HBs) Vaccine Check  - Hepatitis B Surface Antigen (HBsAG)    3. Benign Essential Hypertension  Increase hyzaar to 100/25mg daily and recheck in 4 months.  Sleep study to eval for DELL  - Comprehensive Metabolic Panel  - losartan-hydrochlorothiazide (HYZAAR) 100-25 mg per tablet; Take 1 tablet by mouth daily.  Dispense: 90 tablet; Refill: 4    4. Mixed hyperlipidemia  Continue statin tx  - Lipid Cascade    5. Obesity  LSM reviewed    6. Snoring  - Ambulatory referral to Sleep Medicine      Return in about 4 months (around 10/11/2018) for Recheck.    Patient Education/AVS:  There are no Patient Instructions on file for this visit.    HPI:      Chief Complaint   Patient presents with     Annual Exam     fasting labwork        Zaina Shaikh is a 52 y.o. female and is here for a comprehensive physical exam.     Other concerns today:   ED f/u- found a bruise on her stomach wihtout known cause.  On recall remembers saying \"ow\" but not sure why.  Bruise is finally fading.      Diabetes- sugars don't seem to be as good. Sugars were great on vacation- road trip to [facility] and was very relaxed.  Worried about her " boyfriend and mom's health.  Fasting sugars daily and based on recall.  Higher if she eats after 6:30pm.  Usually numbers 120-130s.  186 after eating [employer]. Has never seen over 200 at bedtime.      Sleep apnea?  Snores really bad. Ready to do her sleep study if needed.     Why is bp higher over past couple of months.      Healthy Habits: Body mass index is 38.74 kg/(m^2).  The following high BMI interventions were performed this visit: encouragement to exercise and lifestyle education regarding diet  Regular Exercise: Deidra and walking a couple days a week  Healthy Diet:toast with bp and fruit for breakfast, tuna with limited bread for lunch, early dinner by 5pm  Smoking:   History   Smoking Status     Former Smoker     Packs/day: 0.25     Types: Cigarettes     Start date:      Quit date: 2015   Smokeless Tobacco     Former User     Quit date: 2015     Comment: 1 cig per month     Dental Visits Regularly: Yes  Eye exams: Yes due this summer  Pregnancy planning: na   History   Sexual Activity     Sexual activity: Not Currently     Partners: Male     Birth control/ protection: Post-menopausal     Seat Belt: Yes  Prevention of Osteoporosis:dairy  Last Dexa:na    Do you take any herbs or supplements that were not prescribed by a doctor? vitamin D      Cancer Screening:  Hemoccults/Colonoscopy: normal - repeat   Mammogram:  Due this summer  Pap Smear:  Due   Lung Cancer: na      Immunization History   Administered Date(s) Administered     DT (pediatric) 2005     Hep A, historic 2007, 2007     Pneumo Conj 13-V (2010&after) 2015     Pneumo Polysac 23-V 2017     Td, Adult, Absorbed 2017     Tdap 2007       OB History    Para Term  AB Living   0 0 0 0 0 0   SAB TAB Ectopic Multiple Live Births   0 0 0 0              Meds  Current Outpatient Prescriptions on File Prior to Visit   Medication Sig Dispense Refill     aspirin 81 MG EC tablet Take  81 mg by mouth daily.       atorvastatin (LIPITOR) 40 MG tablet Take 1 tablet (40 mg total) by mouth at bedtime. 30 tablet 11     blood glucose test (CONTOUR NEXT TEST STRIPS) strips Use 1 each As Directed as needed. 100 strip 0     cholecalciferol, vitamin D3, (VITAMIN D3) 2,000 unit Tab        generic lancets Use 1 each As Directed daily. Dispense brand per patient's insurance at pharmacy discretion. 50 each 6     loratadine (CLARITIN) 10 mg tablet Take 10 mg by mouth daily.       omeprazole (PRILOSEC) 20 MG capsule TAKE 1 CAPSULE(20 MG) BY MOUTH DAILY 90 capsule 3     triamcinolone (KENALOG) 0.5 % cream APPLY SPARINGLY TO THE AFFECTED AREA AND MASSAGE IN TWICE DAILY 30 g 0     No current facility-administered medications on file prior to visit.        Past Medical History  Past Medical History:   Diagnosis Date     Nicotine abuse 3/12/2015       Surgical History  History reviewed. No pertinent surgical history.    Allergies  Lisinopril    Family History  Family History   Problem Relation Age of Onset     Arthritis Mother      Heart disease Mother      Diabetes Mother      Hyperlipidemia Mother      Hypertension Mother      Nephrolithiasis Mother      Heart disease Father      Acute Anterolateral Myocardial Infarction     Diabetes Father      Hyperlipidemia Father      Diabetes Sister      Stroke Sister      Diabetes Brother      Colon cancer Paternal Grandmother 80     Adenocarcinoma of the large intestine     Hypertension Brother      Heart attack Brother      Stroke Sister      Diabetes Maternal Grandmother      Heart attack Paternal Grandfather      No Medical Problems Sister      Breast cancer Neg Hx        Social History  Social History     Social History     Marital status: Single     Spouse name: N/A     Number of children: 0     Years of education: N/A     Occupational History      [employer]     Social History Main Topics     Smoking status: Former Smoker     Packs/day: 0.25      "Types: Cigarettes     Start date: 1983     Quit date: 12/2015     Smokeless tobacco: Former User     Quit date: 12/26/2015      Comment: 1 cig per month     Alcohol use No      Comment: occasionally     Drug use: No     Sexual activity: Not Currently     Partners: Male     Birth control/ protection: Post-menopausal     Other Topics Concern     Not on file     Social History Narrative    Lives with mom         Review of Systems   Complete ROS including General, HEENT, CV, Pulmonary, GI, , Genital, Neuro, Psych, MSK, Heme, Endocrine all within normal except as noted in the HPI.      Objective:   BP (!) 159/92  Pulse 76  Resp 20  Ht 5' 6\" (1.676 m)  Wt (!) 240 lb (108.9 kg)  BMI 38.74 kg/m2 Body mass index is 38.74 kg/(m^2).  Wt Readings from Last 3 Encounters:   06/11/18 (!) 240 lb (108.9 kg)   03/06/18 (!) 239 lb (108.4 kg)   12/06/17 (!) 237 lb (107.5 kg)     PHQ-9 Total Score: 2 (6/11/2018 11:00 AM)  No Data Recorded  PHQ-2 Total Score: 0 (6/11/2018 11:00 AM)  Depression Follow-up Plan: mental health care assessment (6/11/2018 11:00 AM)    Complete physical exam including:  General, HEENT, Neck, breast, back, CV, Pulmonary, Abdominal, extremities, skin, neuro, psych, lymph, genital exams all within normal.    Abnormalities include:  All normal.  GYN and breast exam deferred per pt.  Asymptomatic- getting regular mammograms.  Pt declines.  Not due for pap smear.       Results for orders placed or performed in visit on 06/11/18   Glycosylated Hemoglobin A1c   Result Value Ref Range    Hemoglobin A1c 6.3 (H) 3.5 - 6.0 %   Lipid Cascade   Result Value Ref Range    Cholesterol 165 <=199 mg/dL    Triglycerides 78 <=149 mg/dL    HDL Cholesterol 40 (L) >=50 mg/dL    LDL Calculated 109 <=129 mg/dL    Patient Fasting > 8hrs? Yes    Comprehensive Metabolic Panel   Result Value Ref Range    Sodium 141 136 - 145 mmol/L    Potassium 4.2 3.5 - 5.0 mmol/L    Chloride 106 98 - 107 mmol/L    CO2 27 22 - 31 mmol/L    Anion " Gap, Calculation 8 5 - 18 mmol/L    Glucose 114 70 - 125 mg/dL    BUN 17 8 - 22 mg/dL    Creatinine 0.79 0.60 - 1.10 mg/dL    GFR MDRD Af Amer >60 >60 mL/min/1.73m2    GFR MDRD Non Af Amer >60 >60 mL/min/1.73m2    Bilirubin, Total 0.5 0.0 - 1.0 mg/dL    Calcium 9.5 8.5 - 10.5 mg/dL    Protein, Total 6.7 6.0 - 8.0 g/dL    Albumin 3.7 3.5 - 5.0 g/dL    Alkaline Phosphatase 79 45 - 120 U/L    AST 17 0 - 40 U/L    ALT 21 0 - 45 U/L   Hepatitis C Antibody (Anti-HCV)   Result Value Ref Range    Hepatitis C Ab Negative Negative   Hepatitis B Surface Antibody (Anti-HBs) Vaccine Check   Result Value Ref Range    HBV Surface Ab (Vaccine Check) Negative (!) Positive   Hepatitis B Surface Antigen (HBsAG)   Result Value Ref Range    Hepatitis B Surface Ag Negative Negative

## 2021-06-19 NOTE — PROGRESS NOTES
Dear Dr. Madisyn Stacy Md  [address] [place] [id number]    Thank you for the opportunity to participate in the care of Ms. Zaina Shaikh.    She is a 52 y.o. female who comes to the clinic with a chief complaint of excessive daytime sleepiness that is been going on for at least 2 years.  While the patient denies any episodes of witnessed apnea she has been told that she does have loud snoring during sleep.  The patient's review of systems is otherwise unremarkable.     Ideal Sleep-Wake Cycle(devoid of societal pressure):    Patient would try to initiate sleep at around 9 PM with a sleep latency of variable length. The patient would have 3-5 awakening. Final wake up time is around 5:30 AM.    Past Medical History  Past Medical History:   Diagnosis Date     Nicotine abuse 3/12/2015        Past Surgical History  History reviewed. No pertinent surgical history.     Meds  Current Outpatient Prescriptions   Medication Sig Dispense Refill     aspirin 81 MG EC tablet Take 81 mg by mouth daily.       atorvastatin (LIPITOR) 40 MG tablet Take 1 tablet (40 mg total) by mouth at bedtime. 30 tablet 11     blood glucose test (CONTOUR NEXT TEST STRIPS) strips Use 1 each As Directed as needed. 100 strip 0     cholecalciferol, vitamin D3, (VITAMIN D3) 2,000 unit Tab        generic lancets Use 1 each As Directed daily. Dispense brand per patient's insurance at pharmacy discretion. 50 each 6     loratadine (CLARITIN) 10 mg tablet Take 10 mg by mouth daily.       losartan-hydrochlorothiazide (HYZAAR) 100-25 mg per tablet Take 1 tablet by mouth daily. 90 tablet 4     omeprazole (PRILOSEC) 20 MG capsule TAKE 1 CAPSULE(20 MG) BY MOUTH DAILY 90 capsule 3     triamcinolone (KENALOG) 0.5 % cream APPLY SPARINGLY TO THE AFFECTED AREA AND MASSAGE IN TWICE DAILY 30 g 0     No current facility-administered medications for this visit.         Allergies  Lisinopril     Social History  Social History     Social History     Marital status:  Single     Spouse name: N/A     Number of children: 0     Years of education: N/A     Occupational History      [employer]     Social History Main Topics     Smoking status: Former Smoker     Packs/day: 0.25     Types: Cigarettes     Start date: 1983     Quit date: 12/2015     Smokeless tobacco: Former User     Quit date: 12/26/2015      Comment: 1 cig per month     Alcohol use No      Comment: occasionally     Drug use: No     Sexual activity: Not Currently     Partners: Male     Birth control/ protection: Post-menopausal     Other Topics Concern     Not on file     Social History Narrative    Lives with mom        Family History  Family History   Problem Relation Age of Onset     Arthritis Mother      Heart disease Mother      Diabetes Mother      Hyperlipidemia Mother      Hypertension Mother      Nephrolithiasis Mother      Heart disease Father      Acute Anterolateral Myocardial Infarction     Diabetes Father      Hyperlipidemia Father      Diabetes Sister      Stroke Sister      Diabetes Brother      Colon cancer Paternal Grandmother 80     Adenocarcinoma of the large intestine     Hypertension Brother      Heart attack Brother      Stroke Sister      Diabetes Maternal Grandmother      Heart attack Paternal Grandfather      Sleep apnea Brother      No Medical Problems Sister      Breast cancer Neg Hx         Review of Systems:  Constitutional: Negative except as noted in HPI.   Eyes: Negative except as noted in HPI.   ENT: Negative except as noted in HPI.   Cardiovascular: Negative except as noted in HPI.   Respiratory: Negative except as noted in HPI.   Gastrointestinal: Negative except as noted in HPI.   Genitourinary: Negative except as noted in HPI.   Musculoskeletal: Negative except as noted in HPI.   Integumentary: Negative except as noted in HPI.   Neurological: Negative except as noted in HPI.   Psychiatric: Negative except as noted in HPI.   Endocrine: Negative except as  "noted in HPI.   Hematologic/Lymphatic: Negative except as noted in HPI.      STOP BANG 7/24/2018   Do you snore loudly (louder than talking or loud enough to be heard through closed doors)? 1   Do you often feel tired, fatigued, or sleepy during daytime? 1   Has anyone observed you stop breathing in your sleep? 0   Do you have or are you being treated for high blood pressure? 1   BMI more than 35 kg/m2 1   Age over 50 years old? 1   Neck circumference greater than 16 inches? 1   Gender male? 0   Total Score 6   Epworths Sleepiness Scale 7/24/2018   Sitting and reading 1   Watching TV 0   Sitting, inactive in a public place (e.g. a theatre or a meeting) 0   As a passenger in a car for an hour without a break 2   Lying down to rest in the afternoon when circumstances permit 2   Sitting and talking to someone 0   Sitting quietly after a lunch without alcohol 0   In a car, while stopped for a few minutes in traffic 0   Total score 5   Rooming 7/24/2018   Usual bedtime 9pm   Sleep Latency depends   Awakenings depends   Wake Up Time 520   Energy Drinks 0   Coffee 0   Cola 1-2 daily   Difficulty falling asleep (No Data)   Difficulty staying asleep (No Data)   Excessive daytime tiredness (No Data)   Excessive daytime sleepiness (No Data)   Dozing off while driving No   Shift Worker No   Sleep Walking? No   Sleep Talking? No   Kicking or punching? No   Restless legs symptoms No       Physical Exam:  /70  Pulse 78  Ht 5' 6\" (1.676 m)  Wt (!) 243 lb 8 oz (110.5 kg)  SpO2 100%  BMI 39.3 kg/m2  BMI:Body mass index is 39.3 kg/(m^2).   GEN: NAD, morbidly obese  Head: Normocephalic.  EYES: PERRLA, EOMI  ENT: Oropharynx is clear, mallampatti class 3+ airway. Uvula is intact  Nasal mucosa is moist without erythema  Neck : Thyroid is within normal limits. neck circ 18\"  CV: Regular rate and rhythm, S1 & S2 positive.  LUNGS: Bilateral breathsounds heard.   ABDOMEN: Positive bowel sounds in all quadrants, soft, no rebound or " guarding  MUSCULOSKELETAL: Bilateral trace leg swelling  SKIN: warm, dry, no rashes  Neurological: Alert, oriented to time, place, and person.  Psych: normal mood, normal affect     Labs/Studies:     Lab Results   Component Value Date    WBC 5.9 06/08/2017    HGB 12.8 06/08/2017    HCT 38.6 06/08/2017    MCV 81 06/08/2017     06/08/2017         Chemistry        Component Value Date/Time     06/11/2018 0923    K 4.2 06/11/2018 0923     06/11/2018 0923    CO2 27 06/11/2018 0923    BUN 17 06/11/2018 0923    CREATININE 0.79 06/11/2018 0923     06/11/2018 0923        Component Value Date/Time    CALCIUM 9.5 06/11/2018 0923    ALKPHOS 79 06/11/2018 0923    AST 17 06/11/2018 0923    ALT 21 06/11/2018 0923    BILITOT 0.5 06/11/2018 0923            No results found for: FERRITIN  Lab Results   Component Value Date    TSH 2.58 09/13/2017         Assessment and Plan:  In summary Zaina Shaikh is a 52 y.o. year old female here for sleep disturbance.  1.  Hypersomnia   Ms. Zaina Shaikh has high risk for obstructive sleep apnea based on the hypersomnia, snoring and a crowded airway. I educated the patient on the underlying pathophysiology of obstructive sleep apnea. We reviewed the risks associated with sleep apnea, including increased cardiovascular risk and overall death. We talked about treatments briefly. I recommend getting a Home sleep study or an split-night nocturnal polysomnography.  The patient would prefer the latter.  The patient should return to the clinic to discuss results and treatment option in a patient-centered approach.  2.  Snoring  3.  Other sleep disturbance  4. Elevated blood pressure reading  I will have the patient's blood pressure readings repeated during this clinic visit. I strongly advised the patient to follow up with PCP in one week.    Patient verbalized understanding of these issues, agrees with the plan and all questions were answered today. Patient was given an  opportuntity to voice any other symptoms or concerns not listed above. Patient did not have any other symptoms or concerns.      Patient told to return in one week after the sleep study is interpreted.      Jacob Moody DO  Board Certified in Internal Medicine and Sleep Medicine  [facility].    (Note created with Dragon voice recognition and unintended spelling errors and word substitutions may occur)

## 2021-06-20 NOTE — PROGRESS NOTES
[facility] Office Visit    Chief Complaint:  Chief Complaint   Patient presents with     Follow-up     A1c results         Assessment/Plan:  1. Type 2 diabetes mellitus without complication, without long-term current use of insulin (H)  Addressed smoking status and aspirin therapy.  Recommended annual eye exam and dental cares. Reviewed foot cares and foot exam.  Blood pressure and lipid management reviewed today.  Vaccines reviewed and updated.  Plan for glucose management includes ongoing focus on healthy diabetic diet and increased activity, anticipated bump in a1c related to increased stress and poor eating habits. Pt declines meds and desires to get back to her diet and exercise program and recheck in 3months.  Labs ordered as below including:     Lab Results   Component Value Date    HGBA1C 7.1 (H) 10/11/2018   , No results found for: LDL,   Lab Results   Component Value Date    CREATININE 0.79 10/11/2018       - Glycosylated Hemoglobin A1c  - Basic Metabolic Panel  - Microalbumin, Random Urine    2. Benign Essential Hypertension  BP Readings from Last 3 Encounters:   10/11/18 142/80   07/24/18 145/70   06/11/18 (!) 159/92       uncontrolled today.  Plan for bloodpressure management includes ongoing focus on healthy DASH type diet and increased activity, encouraged to avoid tobacco products and limit alcohol use, stress reduction, likely related to increased stress and poor diet/exercise routine lately.  Pt wants to get back to her routine now and recheck in 3months. Starting CPAP should also help bring down bp. Labwork and meds ordered and reviewed as below  Lab Results   Component Value Date    K 4.3 10/11/2018      Lab Results   Component Value Date    CREATININE 0.79 10/11/2018       - Basic Metabolic Panel    3. DELL (obstructive sleep apnea)  Starting cpap soon- should help with bp control    4. Vaccine counseling  - Hepatitis B Vaccine Age 20 years and above  - Influenza, Seasonal  Quad, Preservative Free 36+ Months    5. Morbid obesity (H)  Continue to work on diet and exercise program.      Return in about 3 months (around 1/11/2019) for Recheck.  The following high BMI interventions were performed this visit: encouragement to exercise and lifestyle education regarding diet    Patient Education/AVS:  Patient Instructions   Be sure to get your eye exam this fall      HPI:   Zaina Shaikh is a 52 y.o. female c/o f/u on her diabetes and high blood pressure.  Last visit in June increased hyzaar to 100/25mg daily.  Did see sleep clinic for sleep apnea evaluation and CPAP ordered to start this week.  Has been checking bp at home 120-140s/60-80s.      Stressful summer- boyfriend needed surgery for PVD, best friend was on life support for a long time with heart problems.  Mom has been getting sicker with her heart failure.  Has been worrying a lot and sister has told her she needs anxiety pills but now that everyone else is settled she is doing much better.  Multiple sisters are on meds for mental health.  Hasn't been very good about her diet but has still been able to make it to [place] 1-3x/week.  Staying social with friends.      ROS:  Constitutional, CV, Resp, GI, , MSK, skin, neuro, psych all negative except as outlined in the HPI above and patient denies any other symptoms.      History summarized from1-2:sleep apnea consult from this fall outlining dx of DELL and need for cpap.  Lab tests reviewed-1: 2018    Physical Exam:  /80 (Patient Site: Right Arm, Patient Position: Sitting, Cuff Size: Adult Large)  Pulse 80  Wt (!) 244 lb (110.7 kg)  BMI 39.38 kg/m2 Body mass index is 39.38 kg/(m^2). No LMP recorded. Patient is not currently having periods (Reason: Premenopausal).  Vital signs reviewed  Wt Readings from Last 3 Encounters:   10/11/18 (!) 244 lb (110.7 kg)   07/24/18 (!) 243 lb 8 oz (110.5 kg)   06/11/18 (!) 240 lb (108.9 kg)     History   Smoking Status     Former Smoker      Packs/day: 0.25     Types: Cigarettes     Start date: 1983     Quit date: 12/2015   Smokeless Tobacco     Former User     Quit date: 12/26/2015     Comment: 1 cig per month     History   Sexual Activity     Sexual activity: Not Currently     Partners: Male     Birth control/ protection: Post-menopausal     No Data Recorded  PHQ-9 Total Score: 2 (6/11/2018 11:00 AM)  PHQ-2 Total Score: 0 (6/11/2018 11:00 AM)  Depression Follow-up Plan: mental health care assessment (6/11/2018 11:00 AM)  No Data Recorded    All normal as below except abnormalities include: ALL NORMAL  General is a  52 y.o. female sitting comfortably in no apparent distress.   Neck: Supple without lymphadenopathy or thyromegally  CV: Regular rate and rhythm S1S2 without rubs, murmurs or gallops,   Lungs: Clear to auscultation bilaterally  Extremities: Warm, No Edema, 2+ Pedal and radial pulses bilaterally  Skin: No lesions or rashes noted  Neuro/MSK: Able to ambulate around the exam room with equal movement, strength and normal coordination of the upper and lower extremeties symmetrically    Results for orders placed or performed in visit on 10/11/18   Glycosylated Hemoglobin A1c   Result Value Ref Range    Hemoglobin A1c 7.1 (H) 3.5 - 6.0 %   Basic Metabolic Panel   Result Value Ref Range    Sodium 143 136 - 145 mmol/L    Potassium 4.3 3.5 - 5.0 mmol/L    Chloride 105 98 - 107 mmol/L    CO2 27 22 - 31 mmol/L    Anion Gap, Calculation 11 5 - 18 mmol/L    Glucose 108 70 - 125 mg/dL    Calcium 9.7 8.5 - 10.5 mg/dL    BUN 12 8 - 22 mg/dL    Creatinine 0.79 0.60 - 1.10 mg/dL    GFR MDRD Af Amer >60 >60 mL/min/1.73m2    GFR MDRD Non Af Amer >60 >60 mL/min/1.73m2   Microalbumin, Random Urine   Result Value Ref Range    Microalbumin, Random Urine 0.50 0.00 - 1.99 mg/dL    Creatinine, Urine 89.5 mg/dL    Microalbumin/Creatinine Ratio Random Urine 5.6 <=19.9 mg/g       Med list and active problem list reviewed and updated as part of this encounter    Current  Outpatient Prescriptions on File Prior to Visit   Medication Sig Dispense Refill     aspirin 81 MG EC tablet Take 81 mg by mouth daily.       atorvastatin (LIPITOR) 40 MG tablet TAKE 1 TABLET(40 MG) BY MOUTH AT BEDTIME 90 tablet 3     cholecalciferol, vitamin D3, (VITAMIN D3) 2,000 unit Tab        CONTOUR NEXT TEST STRIPS strips TEST ONCE DAILY AS NEEDED 100 strip 3     generic lancets Use 1 each As Directed daily. Dispense brand per patient's insurance at pharmacy discretion. 50 each 6     loratadine (CLARITIN) 10 mg tablet Take 10 mg by mouth daily.       losartan-hydrochlorothiazide (HYZAAR) 100-25 mg per tablet Take 1 tablet by mouth daily. 90 tablet 4     omeprazole (PRILOSEC) 20 MG capsule TAKE 1 CAPSULE(20 MG) BY MOUTH DAILY 90 capsule 3     triamcinolone (KENALOG) 0.5 % cream APPLY SPARINGLY TO THE AFFECTED AREA AND MASSAGE IN TWICE DAILY 30 g 0     No current facility-administered medications on file prior to visit.          Madisyn Stacy MD

## 2021-06-20 NOTE — PROGRESS NOTES
Order for Durable Medical Equipment was processed and equipment ordered.     DME provider: [facility]    Date Faxed: 10/8/18    Ordering Provider: Dr. Moody    Equipment ordered: CPAP

## 2021-06-20 NOTE — PROGRESS NOTES
Order for Durable Medical Equipment was processed and equipment ordered.     DME provider: [facility]    Date Faxed: 10/9/2018    Ordering Provider: Dr. Moody    Equipment ordered: CPAP

## 2021-06-21 NOTE — PROGRESS NOTES
Patient was offered choice of vendor and chose [facility].  Patient Zaina A Esboldt was set up at [facility] on 10/19/18. Patient received a Resmed [id number] Auto. Pressures were set at 5-15.   Patient s ramp is 5 cm H2O for off and FLEX/EPR is EPR 2.  Patient received a Resmed Mask name: Vickey FX for her  pillow Size med, heated tubing and heated humidifier.    Pacee Her

## 2021-06-22 ENCOUNTER — COMMUNICATION - HEALTHEAST (OUTPATIENT)
Dept: FAMILY MEDICINE | Facility: CLINIC | Age: 56
End: 2021-06-22

## 2021-06-22 DIAGNOSIS — I10 ESSENTIAL HYPERTENSION, BENIGN: ICD-10-CM

## 2021-06-22 RX ORDER — AMLODIPINE BESYLATE 10 MG/1
TABLET ORAL
Qty: 90 TABLET | Refills: 1 | Status: SHIPPED | OUTPATIENT
Start: 2021-06-22 | End: 2021-09-10

## 2021-06-22 NOTE — PROGRESS NOTES
Dear Dr. Madisyn Stacy MD  [address] [place] [id number],    Thank you for the opportunity to participate in the care of Zaina Shaikh.     She is a 53 y.o.  female patient who comes to the sleep medicine clinic for review of her sleep study and compliance download data. The study was completed on 10/2/18 which showed the the patient had mild obstructive sleep apnea with an apnea hypopnea index of 7.6 events per hour with the lowest O2 sat of 84%.  The patient was informed of the results and offer the option to initiate pressure therapy which she agreed.  Since starting CPAP therapy the patient states that she is dreaming a lot more compared to before.  Furthermore she is no longer suffering from drowsy driving.    Compliance Download data for 30 days:  Pressure setting: APAP 5-15 CWP  Residual AHI: 1.4 events per hour  Leak: Minimal  Compliance: 73%  Mask Tolerance: Good  Skin irritation: None    Current Outpatient Medications   Medication Sig Dispense Refill     aspirin 81 MG EC tablet Take 81 mg by mouth daily.       atorvastatin (LIPITOR) 40 MG tablet TAKE 1 TABLET(40 MG) BY MOUTH AT BEDTIME 90 tablet 3     cholecalciferol, vitamin D3, (VITAMIN D3) 2,000 unit Tab        CONTOUR NEXT TEST STRIPS strips TEST ONCE DAILY AS NEEDED 100 strip 3     generic lancets Use 1 each As Directed daily. Dispense brand per patient's insurance at pharmacy discretion. 50 each 6     loratadine (CLARITIN) 10 mg tablet Take 10 mg by mouth daily.       losartan-hydrochlorothiazide (HYZAAR) 100-25 mg per tablet Take 1 tablet by mouth daily. 90 tablet 4     omeprazole (PRILOSEC) 20 MG capsule TAKE 1 CAPSULE(20 MG) BY MOUTH DAILY 90 capsule 3     triamcinolone (KENALOG) 0.5 % cream APPLY SPARINGLY TO THE AFFECTED AREA AND MASSAGE IN TWICE DAILY 30 g 0     No current facility-administered medications for this visit.        Allergies   Allergen Reactions     Lisinopril Cough       Physical Exam:  Pulse 77   SpO2 98%   BMI:There  is no height or weight on file to calculate BMI.   GEN: NAD, obese  Psych: normal mood, normal affect     Labs/Studies:  - We reviewed the results of the overnight PSG as described on the HPI.     Lab Results   Component Value Date    WBC 5.9 06/08/2017    HGB 12.8 06/08/2017    HCT 38.6 06/08/2017    MCV 81 06/08/2017     06/08/2017         Chemistry        Component Value Date/Time     10/11/2018 0936    K 4.3 10/11/2018 0936     10/11/2018 0936    CO2 27 10/11/2018 0936    BUN 12 10/11/2018 0936    CREATININE 0.79 10/11/2018 0936     10/11/2018 0936        Component Value Date/Time    CALCIUM 9.7 10/11/2018 0936    ALKPHOS 79 06/11/2018 0923    AST 17 06/11/2018 0923    ALT 21 06/11/2018 0923    BILITOT 0.5 06/11/2018 0923            No results found for: FERRITIN        Assessment and Plan:  In summary Zaina Shaikh is a 53 y.o. year old female here for review of her sleep study compliance download data.  1. Obstructive Sleep Apnea  I congratulated the patient on her excellent CPAP usage.  I will narrow her CPAP pressure range to 5-11 CWP.  I welcome her to follow-up with me in 2 years  2.  Other sleep disturbance     Patient verbalized understanding of these issues, agrees with the plan and all questions were answered today. Patient was given an opportuntity to voice any other symptoms or concerns not listed above. Patient did not have any other symptoms or concerns.        Jacob Moody DO  Board Certified in Internal Medicine and Sleep Medicine  [facility].    We spent a total of 10 minutes of face-to-face encounter and more than 50% of the encounter was used for counseling or coordination of care.    (Note created with Dragon voice recognition and unintended spelling errors and word substitutions may occur)

## 2021-06-23 NOTE — PATIENT INSTRUCTIONS - HE
Ground flax and Portillo seeds.      Have your eye doctor send me their report from this winter's checkup    Start amlodipine 10mg once a day with your other pills

## 2021-06-23 NOTE — PROGRESS NOTES
[facility] Office Visit    Chief Complaint:  Chief Complaint   Patient presents with     Follow-up     A1C     Medication     Headcold         Assessment/Plan:  1. Type 2 diabetes mellitus without complication, without long-term current use of insulin (H)  Addressed smoking status and aspirin therapy.  Recommended annual eye exam and dental cares. Reviewed foot cares and foot exam.  Blood pressure and lipid management reviewed today.  Vaccines reviewed and updated.  Plan for glucose management includes ongoing focus on healthy diabetic diet and increased activity, no need for medications at this time.  Labs ordered as below including:     Lab Results   Component Value Date    HGBA1C 6.6 (H) 01/11/2019   , No results found for: LDL,   Lab Results   Component Value Date    CREATININE 0.78 01/11/2019       - Glycosylated Hemoglobin A1c  - Basic Metabolic Panel    2. Benign Essential Hypertension  BP Readings from Last 3 Encounters:   01/11/19 156/90   10/11/18 142/80   07/24/18 145/70       uncontrolled today.  Plan for bloodpressure management includes ongoing focus on healthy DASH type diet and increased activity, encouraged to avoid tobacco products and limit alcohol use, stress reduction, last few bp readings have been high despite efforts at diet and exercise (that has reduced a1c).  cont. losaratn-hctz 100/25mg daily and add amlodipine 10mg daily.  Labwork and meds ordered and reviewed as below  Lab Results   Component Value Date    K 4.4 01/11/2019      Lab Results   Component Value Date    CREATININE 0.78 01/11/2019       - Basic Metabolic Panel  - amLODIPine (NORVASC) 10 MG tablet; Take 1 tablet (10 mg total) by mouth daily.  Dispense: 30 tablet; Refill: 4    3. Vaccine counseling  #2 today  - Hepatitis B Vaccine Age 20 years and above      Return in about 4 months (around 5/11/2019) for Recheck.  The following high BMI interventions were performed this visit: encouragement to exercise and  lifestyle education regarding diet    Patient Education/AVS:  Patient Instructions   Ground flax and Portillo seeds.      Have your eye doctor send me their report from this winter's checkup    Start amlodipine 10mg once a day with your other pills      HPI:   Zaina Shaikh is a 53 y.o. female c/o f/u on diabetes and bad head cold.  Hep B #2 today.    Joined weight watchers and lost 9 pounds after gaining weight after her last visit.  Found that she was overeating in evenings.  Fasting sugars were getting to be 140-160s.  Since switching her diet around fastings are 110-120s.  BP at home 130/80s in the morning and 120/80s in evenings.  Did get cpap but hard to wear with head cold.  Not on any meds for her head cold.  Has a fit bit and getting 00184 steps most days, especially if she does her [employer] class.  Getting to [employer] 3x/week without any problems.  Has eye apt scheduled later this winter.      Started to get a tickle in her throat last week.  This week has been exhausted and really struggling to get out of bed.  Last 2-3 days has had increased mucous/drainage.  No fever.  No cough.  Sister has been sick with bronchitis for weeks.      Has had increased pain in her lower back but suspects that this is related to not having a good BM in days- unusual this week.  Usually has BM twice a day.       ROS:  Constitutional, CV, Resp, GI, , MSK, skin, neuro, psych all negative except as outlined in the HPI above and patient denies any other symptoms.      Lab tests reviewed-1: 2018    Physical Exam:  /90   Pulse 76   Temp 97.5  F (36.4  C) (Oral)   Wt (!) 240 lb (108.9 kg)   BMI 38.74 kg/m   Body mass index is 38.74 kg/m . No LMP recorded. Patient is premenopausal.  Vital signs reviewed  Wt Readings from Last 3 Encounters:   01/11/19 (!) 240 lb (108.9 kg)   10/11/18 (!) 244 lb (110.7 kg)   07/24/18 (!) 243 lb 8 oz (110.5 kg)     Social History     Tobacco Use   Smoking Status Former Smoker     Packs/day: 0.25      Types: Cigarettes     Start date: 1983     Last attempt to quit: 12/2015     Years since quitting: 3.1   Smokeless Tobacco Former User     Quit date: 12/26/2015   Tobacco Comment    1 cig per month     Social History     Substance and Sexual Activity   Sexual Activity Not Currently     Partners: Male     Birth control/protection: Post-menopausal     No Data Recorded  PHQ-9 Total Score: 2 (6/11/2018 11:00 AM)    PHQ-2 Total Score: 0 (6/11/2018 11:00 AM)  Depression Follow-up Plan: mental health care assessment (6/11/2018 11:00 AM)    No Data Recorded    All normal as below except abnormalities include: grossly normal exam today  General is a  53 y.o. female sitting comfortably in no apparent distress.   Neck: Supple without lymphadenopathy or thyromegally  CV: Regular rate and rhythm S1S2 without rubs, murmurs or gallops,   Lungs: Clear to auscultation bilaterally  Extremities: Warm, No Edema, 2+ Pedal and radial pulses bilaterally  Skin: No lesions or rashes noted  Neuro/MSK: Able to ambulate around the exam room with equal movement, strength and normal coordination of the upper and lower extremeties symmetrically    Results for orders placed or performed in visit on 01/11/19   Glycosylated Hemoglobin A1c   Result Value Ref Range    Hemoglobin A1c 6.6 (H) 3.5 - 6.0 %   Basic Metabolic Panel   Result Value Ref Range    Sodium 142 136 - 145 mmol/L    Potassium 4.4 3.5 - 5.0 mmol/L    Chloride 107 98 - 107 mmol/L    CO2 28 22 - 31 mmol/L    Anion Gap, Calculation 7 5 - 18 mmol/L    Glucose 108 70 - 125 mg/dL    Calcium 9.6 8.5 - 10.5 mg/dL    BUN 16 8 - 22 mg/dL    Creatinine 0.78 0.60 - 1.10 mg/dL    GFR MDRD Af Amer >60 >60 mL/min/1.73m2    GFR MDRD Non Af Amer >60 >60 mL/min/1.73m2       Med list and active problem list reviewed and updated as part of this encounter    Current Outpatient Medications on File Prior to Visit   Medication Sig Dispense Refill     aspirin 81 MG EC tablet Take 81 mg by mouth daily.        atorvastatin (LIPITOR) 40 MG tablet TAKE 1 TABLET(40 MG) BY MOUTH AT BEDTIME 90 tablet 3     cholecalciferol, vitamin D3, (VITAMIN D3) 2,000 unit Tab        CONTOUR NEXT TEST STRIPS strips TEST ONCE DAILY AS NEEDED 100 strip 3     generic lancets Use 1 each As Directed daily. Dispense brand per patient's insurance at pharmacy discretion. 50 each 6     loratadine (CLARITIN) 10 mg tablet Take 10 mg by mouth daily.       losartan-hydrochlorothiazide (HYZAAR) 100-25 mg per tablet Take 1 tablet by mouth daily. 90 tablet 4     omeprazole (PRILOSEC) 20 MG capsule TAKE 1 CAPSULE(20 MG) BY MOUTH DAILY 90 capsule 2     triamcinolone (KENALOG) 0.5 % cream APPLY SPARINGLY TO THE AFFECTED AREA AND MASSAGE IN TWICE DAILY 30 g 6     No current facility-administered medications on file prior to visit.          Madisyn Stacy MD

## 2021-06-26 ENCOUNTER — HEALTH MAINTENANCE LETTER (OUTPATIENT)
Age: 56
End: 2021-06-26

## 2021-07-13 ENCOUNTER — RECORDS - HEALTHEAST (OUTPATIENT)
Dept: ADMINISTRATIVE | Facility: CLINIC | Age: 56
End: 2021-07-13

## 2021-07-21 ENCOUNTER — RECORDS - HEALTHEAST (OUTPATIENT)
Dept: ADMINISTRATIVE | Facility: CLINIC | Age: 56
End: 2021-07-21

## 2021-08-05 ENCOUNTER — MYC MEDICAL ADVICE (OUTPATIENT)
Dept: FAMILY MEDICINE | Facility: CLINIC | Age: 56
End: 2021-08-05

## 2021-08-05 DIAGNOSIS — E11.9 TYPE 2 DIABETES MELLITUS WITHOUT COMPLICATION, WITHOUT LONG-TERM CURRENT USE OF INSULIN (H): ICD-10-CM

## 2021-08-06 NOTE — PROGRESS NOTES
[facility] Office Visit    Chief Complaint:  Chief Complaint   Patient presents with     Follow-up     diabetes         Assessment/Plan:  1. Type 2 diabetes mellitus without complication, without long-term current use of insulin  Addressed smoking status and aspirin therapy.  Recommended annual eye exam and dental cares. Reviewed foot cares and foot exam.  Blood pressure and lipid management reviewed today.  Vaccines reviewed and updated.  Plan for glucose management includes ongoing focus on healthy diabetic diet and increased activity, would recommend starting to check her sugars to start with.  If her sugars are over 120 fasting and over 200 after she eats then would recommend starting metformin and titration up as tolerated.  Labs ordered as below including:     Lab Results   Component Value Date    HGBA1C 7.4 (H) 09/13/2017   , No results found for: LDL,   Lab Results   Component Value Date    CREATININE 0.81 09/13/2017       - Glycosylated Hemoglobin A1c  - Microalbumin, Random Urine  - Basic Metabolic Panel  - metFORMIN (GLUCOPHAGE XR) 500 MG 24 hr tablet; Take 1 tablet (500 mg total) by mouth daily with supper.  Dispense: 30 tablet; Refill: 3  - Ambulatory referral to Diabetes Education (Existing Diagnosis)  - atorvastatin (LIPITOR) 40 MG tablet; Take 1 tablet (40 mg total) by mouth at bedtime.  Dispense: 30 tablet; Refill: 11    2. Benign Essential Hypertension  BP Readings from Last 3 Encounters:   09/13/17 132/88   06/08/17 130/88   08/25/16 136/84       well-controlled today.  Plan for bloodpressure management includes ongoing focus on healthy DASH type diet and increased activity, encouraged to avoid tobacco products and limit alcohol use, stress reduction, losartan-hctz 50/12.5mg daily.  Labwork and meds ordered and reviewed as below  Lab Results   Component Value Date    K 4.5 09/13/2017      Lab Results   Component Value Date    CREATININE 0.81 09/13/2017       - Basic Metabolic  Panel    3. Hot flashes  Consider black cohash, etc.  Consider effexor  - Follicle Stimulating Hormone (FSH)  - Thyroid Cascade    4. Mixed hyperlipidemia  - atorvastatin (LIPITOR) 40 MG tablet; Take 1 tablet (40 mg total) by mouth at bedtime.  Dispense: 30 tablet; Refill: 11    5. Biceps tendinopathy, right  - Ambulatory referral to Orthopedics      Return in about 3 months (around 12/13/2017).  The following high BMI interventions were performed this visit: encouragement to exercise and lifestyle education regarding diet    Patient Education/AVS:  Patient Instructions   For hotflashes-  Consider Black Cohash  Consider topical or oral estrogen  Celexa or paxil really help with hotflashes          HPI:   Zaina Shaikh is a 51 y.o. female c/o f/u on diabetes, hot flashes, right shoulder/upper arm pain.     Getting bad hot flashes all day long.  Older sisters get them but not as bad or often.  LMP June.      Pain in right upper arm worse after typing all day.  Radiates up to neck and down to elbows.  Can only type about 1.5hrs before she starts to get severe pain.     Diabetes- [facility]ra 3x/week.  Sedentary work but otherwise very active.  Does really well with her diet during the work week but cheats a lot on the weekend.  Likes to snack on cruntchy carbs- chips, pretzels, etc.  Not checking sugars yet.  Just dx with diabetes last year.  Hates the thought of being on another medication.      Lab tests reviewed-1: 2017    Physical Exam:  /88 (Patient Site: Left Arm, Patient Position: Sitting, Cuff Size: Adult Large)  Pulse 88  Resp 20  Wt (!) 243 lb (110.2 kg)  LMP 06/10/2017 (Approximate)  BMI 39.52 kg/m2 Body mass index is 39.52 kg/(m^2). Patient's last menstrual period was 06/10/2017 (approximate).  Vital signs reviewed  Wt Readings from Last 3 Encounters:   09/13/17 (!) 243 lb (110.2 kg)   06/08/17 (!) 242 lb 0.6 oz (109.8 kg)   08/25/16 (!) 241 lb (109.3 kg)     History   Smoking Status     Former Smoker      Packs/day: 0.25     Types: Cigarettes     Start date: 1983     Quit date: 12/2015   Smokeless Tobacco     Former User     Quit date: 12/26/2015     Comment: 1 cig per month     History   Sexual Activity     Sexual activity: No     No Data Recorded  No Data Recorded  PHQ-2 Total Score: 0 (6/8/2017  9:00 AM)  No Data Recorded    All normal as below except abnormalities include: pt appears anxious but at baseline.    General is a  51 y.o. female sitting comfortably in no apparent distress.   Neck: Supple without lymphadenopathy or thyromegally  CV: Regular rate and rhythm S1S2 without rubs, murmurs or gallops,   Lungs: Clear to auscultation bilaterally  Extremities: Warm, No Edema, 2+ Pedal and radial pulses bilaterally  Skin: No lesions or rashes noted  Neuro/MSK: Able to ambulate around the exam room with equal movement, strength and normal coordination of the upper and lower extremeties symmetrically    Results for orders placed or performed in visit on 09/13/17   Glycosylated Hemoglobin A1c   Result Value Ref Range    Hemoglobin A1c 7.4 (H) 3.5 - 6.0 %   Microalbumin, Random Urine   Result Value Ref Range    Microalbumin, Random Urine 0.92 0.00 - 1.99 mg/dL    Creatinine, Urine 193.4 mg/dL    Microalbumin/Creatinine Ratio Random Urine 4.8 <=19.9 mg/g   Basic Metabolic Panel   Result Value Ref Range    Sodium 142 136 - 145 mmol/L    Potassium 4.5 3.5 - 5.0 mmol/L    Chloride 103 98 - 107 mmol/L    CO2 30 22 - 31 mmol/L    Anion Gap, Calculation 9 5 - 18 mmol/L    Glucose 113 70 - 125 mg/dL    Calcium 9.8 8.5 - 10.5 mg/dL    BUN 18 8 - 22 mg/dL    Creatinine 0.81 0.60 - 1.10 mg/dL    GFR MDRD Af Amer >60 >60 mL/min/1.73m2    GFR MDRD Non Af Amer >60 >60 mL/min/1.73m2   Follicle Stimulating Hormone (FSH)   Result Value Ref Range    FSH 38.9 mIU/mL   Thyroid Cascade   Result Value Ref Range    TSH 2.58 0.30 - 5.00 uIU/mL       ROS:  10 point review of symptoms all negative except as outlined in the HPI  above.    Med list and active problem list reviewed and updated as part of this encounter    Current Outpatient Prescriptions on File Prior to Visit   Medication Sig Dispense Refill     aspirin 81 MG EC tablet Take 81 mg by mouth daily.       cholecalciferol, vitamin D3, (VITAMIN D3) 2,000 unit Tab        losartan-hydrochlorothiazide (HYZAAR) 50-12.5 mg per tablet Take 1 tablet by mouth daily. 90 tablet 4     omeprazole (PRILOSEC) 20 MG capsule Take 1 capsule (20 mg total) by mouth daily. 90 capsule 4     triamcinolone (KENALOG) 0.5 % cream APPLY SPARINGLY TO THE AFFECTED AREA AND MASSAGE IN TWICE DAILY. 30 g 2     No current facility-administered medications on file prior to visit.          Madisyn Stacy MD    This document was created using voice recognition software which may contain typographical errors.

## 2021-09-10 ENCOUNTER — OFFICE VISIT (OUTPATIENT)
Dept: FAMILY MEDICINE | Facility: CLINIC | Age: 56
End: 2021-09-10
Payer: COMMERCIAL

## 2021-09-10 ENCOUNTER — MYC MEDICAL ADVICE (OUTPATIENT)
Dept: FAMILY MEDICINE | Facility: CLINIC | Age: 56
End: 2021-09-10

## 2021-09-10 ENCOUNTER — ANCILLARY PROCEDURE (OUTPATIENT)
Dept: MAMMOGRAPHY | Facility: CLINIC | Age: 56
End: 2021-09-10
Attending: FAMILY MEDICINE
Payer: COMMERCIAL

## 2021-09-10 VITALS
BODY MASS INDEX: 39.58 KG/M2 | WEIGHT: 246.25 LBS | HEIGHT: 66 IN | SYSTOLIC BLOOD PRESSURE: 136 MMHG | DIASTOLIC BLOOD PRESSURE: 80 MMHG | HEART RATE: 74 BPM | TEMPERATURE: 97.8 F | OXYGEN SATURATION: 99 %

## 2021-09-10 DIAGNOSIS — N39.43 POST-VOID DRIBBLING: ICD-10-CM

## 2021-09-10 DIAGNOSIS — Z00.00 ANNUAL PHYSICAL EXAM: Primary | ICD-10-CM

## 2021-09-10 DIAGNOSIS — Z12.31 VISIT FOR SCREENING MAMMOGRAM: ICD-10-CM

## 2021-09-10 DIAGNOSIS — I10 ESSENTIAL HYPERTENSION, BENIGN: ICD-10-CM

## 2021-09-10 DIAGNOSIS — M16.0 PRIMARY OSTEOARTHRITIS OF BOTH HIPS: ICD-10-CM

## 2021-09-10 DIAGNOSIS — E11.9 TYPE 2 DIABETES MELLITUS WITHOUT COMPLICATION, WITHOUT LONG-TERM CURRENT USE OF INSULIN (H): ICD-10-CM

## 2021-09-10 DIAGNOSIS — E66.01 CLASS 3 SEVERE OBESITY DUE TO EXCESS CALORIES WITH SERIOUS COMORBIDITY AND BODY MASS INDEX (BMI) OF 40.0 TO 44.9 IN ADULT (H): ICD-10-CM

## 2021-09-10 DIAGNOSIS — M54.42 CHRONIC LEFT-SIDED LOW BACK PAIN WITH LEFT-SIDED SCIATICA: ICD-10-CM

## 2021-09-10 DIAGNOSIS — E66.813 CLASS 3 SEVERE OBESITY DUE TO EXCESS CALORIES WITH SERIOUS COMORBIDITY AND BODY MASS INDEX (BMI) OF 40.0 TO 44.9 IN ADULT (H): ICD-10-CM

## 2021-09-10 DIAGNOSIS — G89.29 CHRONIC LEFT-SIDED LOW BACK PAIN WITH LEFT-SIDED SCIATICA: ICD-10-CM

## 2021-09-10 LAB
ALBUMIN SERPL-MCNC: 4.1 G/DL (ref 3.5–5)
ALBUMIN UR-MCNC: NEGATIVE MG/DL
ALP SERPL-CCNC: 80 U/L (ref 45–120)
ALT SERPL W P-5'-P-CCNC: 25 U/L (ref 0–45)
ANION GAP SERPL CALCULATED.3IONS-SCNC: 14 MMOL/L (ref 5–18)
APPEARANCE UR: CLEAR
AST SERPL W P-5'-P-CCNC: 15 U/L (ref 0–40)
BACTERIA #/AREA URNS HPF: ABNORMAL /HPF
BILIRUB SERPL-MCNC: 0.5 MG/DL (ref 0–1)
BILIRUB UR QL STRIP: NEGATIVE
BUN SERPL-MCNC: 14 MG/DL (ref 8–22)
CALCIUM SERPL-MCNC: 9.9 MG/DL (ref 8.5–10.5)
CHLORIDE BLD-SCNC: 104 MMOL/L (ref 98–107)
CO2 SERPL-SCNC: 25 MMOL/L (ref 22–31)
COLOR UR AUTO: YELLOW
CREAT SERPL-MCNC: 0.76 MG/DL (ref 0.6–1.1)
CREAT UR-MCNC: 87 MG/DL
GFR SERPL CREATININE-BSD FRML MDRD: 87 ML/MIN/1.73M2
GLUCOSE BLD-MCNC: 128 MG/DL (ref 70–125)
GLUCOSE UR STRIP-MCNC: NEGATIVE MG/DL
HBA1C MFR BLD: 6.8 % (ref 0–5.6)
HGB UR QL STRIP: NEGATIVE
KETONES UR STRIP-MCNC: NEGATIVE MG/DL
LEUKOCYTE ESTERASE UR QL STRIP: NEGATIVE
MICROALBUMIN UR-MCNC: 0.51 MG/DL (ref 0–1.99)
MICROALBUMIN/CREAT UR: 5.9 MG/G CR
NITRATE UR QL: NEGATIVE
PH UR STRIP: 5 [PH] (ref 5–8)
POTASSIUM BLD-SCNC: 3.8 MMOL/L (ref 3.5–5)
PROT SERPL-MCNC: 7.3 G/DL (ref 6–8)
RBC #/AREA URNS AUTO: ABNORMAL /HPF
SODIUM SERPL-SCNC: 143 MMOL/L (ref 136–145)
SP GR UR STRIP: 1.02 (ref 1–1.03)
SQUAMOUS #/AREA URNS AUTO: ABNORMAL /LPF
TSH SERPL DL<=0.005 MIU/L-ACNC: 1.73 UIU/ML (ref 0.3–5)
UROBILINOGEN UR STRIP-ACNC: 0.2 E.U./DL
WBC #/AREA URNS AUTO: ABNORMAL /HPF

## 2021-09-10 PROCEDURE — 99396 PREV VISIT EST AGE 40-64: CPT | Mod: 25 | Performed by: FAMILY MEDICINE

## 2021-09-10 PROCEDURE — 77067 SCR MAMMO BI INCL CAD: CPT | Mod: TC | Performed by: RADIOLOGY

## 2021-09-10 PROCEDURE — 99213 OFFICE O/P EST LOW 20 MIN: CPT | Mod: 25 | Performed by: FAMILY MEDICINE

## 2021-09-10 PROCEDURE — 36415 COLL VENOUS BLD VENIPUNCTURE: CPT | Performed by: FAMILY MEDICINE

## 2021-09-10 PROCEDURE — 87624 HPV HI-RISK TYP POOLED RSLT: CPT | Performed by: FAMILY MEDICINE

## 2021-09-10 PROCEDURE — 84443 ASSAY THYROID STIM HORMONE: CPT | Performed by: FAMILY MEDICINE

## 2021-09-10 PROCEDURE — 81001 URINALYSIS AUTO W/SCOPE: CPT | Performed by: FAMILY MEDICINE

## 2021-09-10 PROCEDURE — 83036 HEMOGLOBIN GLYCOSYLATED A1C: CPT | Performed by: FAMILY MEDICINE

## 2021-09-10 PROCEDURE — 90682 RIV4 VACC RECOMBINANT DNA IM: CPT | Performed by: FAMILY MEDICINE

## 2021-09-10 PROCEDURE — 82043 UR ALBUMIN QUANTITATIVE: CPT | Performed by: FAMILY MEDICINE

## 2021-09-10 PROCEDURE — G0123 SCREEN CERV/VAG THIN LAYER: HCPCS | Performed by: FAMILY MEDICINE

## 2021-09-10 PROCEDURE — 80053 COMPREHEN METABOLIC PANEL: CPT | Performed by: FAMILY MEDICINE

## 2021-09-10 PROCEDURE — 90471 IMMUNIZATION ADMIN: CPT | Performed by: FAMILY MEDICINE

## 2021-09-10 ASSESSMENT — MIFFLIN-ST. JEOR: SCORE: 1714.11

## 2021-09-10 NOTE — PROGRESS NOTES
[facility] Health Maintenance Exam  [facility]      Assessment/Plan     1. Health Maintenance female exam.   Health maintenance discussed as appropriate for age and risk factors including:  Nutrition, exercise, alcohol use, tobacco use, safe sexual practices, dental health.  Cancer screening assessed and ordered as indicated. Routine labs as outlined below based on age and risk factors reviewed today. Immunizations reviewed and updated.       Annual physical exam  - Albumin Random Urine Quantitative with Creat Ratio  - Pap Screen with HPV - recommended age 30 - 65 years  - Hemoglobin A1c  - Comprehensive metabolic panel (BMP + Alb, Alk Phos, ALT, AST, Total. Bili, TP)  - Hemoglobin A1c  - Comprehensive metabolic panel (BMP + Alb, Alk Phos, ALT, AST, Total. Bili, TP)  - Albumin Random Urine Quantitative with Creat Ratio  - TSH with free T4 reflex  - TSH with free T4 reflex  - Pap Screen with HPV - recommended age 30 - 65 years  - HPV High Risk Types DNA Cervical    Benign Essential Hypertension  BP Readings from Last 3 Encounters:   09/10/21 136/80   03/26/21 132/82   09/03/20 131/79        controlled today.  Plan for bloodpressure management includes ongoing focus on healthy DASH type diet and increased activity, encouraged to avoid tobacco products and limit alcohol use, stress reduction, continue amlodipine 10mg daily and losartan/hctz 100/25mg daily.  Labwork and meds ordered and reviewed as below  Potassium   Date Value Ref Range Status   09/10/2021 3.8 3.5 - 5.0 mmol/L Final      Creatinine   Date Value Ref Range Status   09/10/2021 0.76 0.60 - 1.10 mg/dL Final        - Comprehensive metabolic panel (BMP + Alb, Alk Phos, ALT, AST, Total. Bili, TP)  - Comprehensive metabolic panel (BMP + Alb, Alk Phos, ALT, AST, Total. Bili, TP)    Type 2 diabetes mellitus without complication, without long-term current use of insulin (H)   Controlled.  Addressed smoking status and aspirin therapy.  Recommended  annual eye exam and dental cares. Reviewed foot cares and foot exam.  Blood pressure and lipid management reviewed today.  Vaccines reviewed and updated.  Plan for glucose management includes ongoing focus on healthy diabetic diet and increased activity, continue off meds.  Labs ordered as below including:     Hemoglobin A1C   Date Value Ref Range Status   09/10/2021 6.8 (H) 0.0 - 5.6 % Final     Comment:     Normal <5.7%   Prediabetes 5.7-6.4%    Diabetes 6.5% or higher     Note: Adopted from ADA consensus guidelines.   03/26/2021 6.9 (H) <=5.6 % Final   09/03/2020 6.5 (H) <=5.6 % Final     Comment:     Normal <5.7% Prediabete 5.7-6.4% Diabletes 6.5% or higher - adopted from ADA consensus guidelines    ,   Lab Results   Component Value Date     03/26/2021   ,   Creatinine   Date Value Ref Range Status   09/10/2021 0.76 0.60 - 1.10 mg/dL Final        - Albumin Random Urine Quantitative with Creat Ratio  - Hemoglobin A1c  - Comprehensive metabolic panel (BMP + Alb, Alk Phos, ALT, AST, Total. Bili, TP)  - Hemoglobin A1c  - Comprehensive metabolic panel (BMP + Alb, Alk Phos, ALT, AST, Total. Bili, TP)  - Albumin Random Urine Quantitative with Creat Ratio    Primary osteoarthritis of both hips  May be related to lumbar issues.  Check MRI.    - MR Lumbar Spine w/o Contrast    Post-void dribbling  Check and treat UTI.  kegels   UA with Microscopic reflex to Culture - Clinic Collect  - Urine Microscopic    Class 3 severe obesity due to excess calories with serious comorbidity and body mass index (BMI) of 40.0 to 44.9 in adult (H)  LSM reviewed.     Chronic left-sided low back pain with left-sided sciatica  - MR Lumbar Spine w/o Contrast       Return in about 5 months (around 2/10/2022) for Office Visit.    Patient has been advised of split billing requirements and indicates understanding: Yes    Patient Education/AVS:  There are no Patient Instructions on file for this visit.    HPI:      Chief Complaint   Patient  presents with     Physical     fasting     Musculoskeletal Problem       Zaina Shaikh is a 55 year old female and is here for a comprehensive health maintenance exam.     Other concerns today:   Left hip pain ongoing.  Better with PT but got bad again.  Achy in thigh.  Wondering what next steps are?  Walking isn't fun.  Injection? Xray showing moderate to advanced DJD.  Getting pain in left butt so wondering about sciatica.      A lot of financial stress.      Leaking urine after she empties her bladder.      Hot flashes again.  Not bad.     Checking fastings irregular and doing well.       Healthy Habits: Body mass index is 40.1 kg/m .  HPI     Regular Exercise: limited due to pain and her exercise class has shut down.   Healthy Diet:yes- diabetic.    Smoking:   History   Smoking Status     Former Smoker     Packs/day: 0.50     Years: 32.00     Types: Cigarettes, Cigarettes     Start date: 1/1/1983     Quit date: 12/1/2015   Smokeless Tobacco     Former User     Quit date: 12/26/2015     Dental Visits Regularly: Yes  Eye exams: Yes  Pregnancy planning: na   History   Sexual Activity     Sexual activity: Not Currently     Partners: Male     Birth control/ protection: Post-menopausal     Seat Belt: Yes  Prevention of Osteoporosis:yes  Last Dexa:na    Cancer Screening:  Hemoccults/Colonoscopy: normal 2020 repeat 10 yeras  Mammogram:  due  Pap Smear:  due  Lung Cancer: na    Counseling Resources:  ATP IV Guidelines  Pooled Cohorts Equation Calculator  Breast Cancer Risk Calculator  BRCA-Related Cancer Risk Assessment: FHS-7 Tool  FRAX Risk Assessment  ICSI Preventive Guidelines  Dietary Guidelines for Americans, 2010  [employer]'s MyPlate  ASA Prophylaxis  Lung CA Screening    Immunization History   Administered Date(s) Administered     COVID-19,PF,Pfizer 04/01/2021, 04/22/2021     DT (PEDS <7y) 02/09/2005     HepA, Unspecified 03/28/2007, 09/26/2007     HepB-Adult 10/11/2018, 01/11/2019, 05/06/2019     Influenza Quad,  Recombinant, pf(RIV4) (Flublok) 10/21/2019, 2020, 09/10/2021     Influenza Vaccine IM > 6 months Valent IIV4 (Alfuria,Fluzone) 10/11/2018     Pneumo Conj 13-V (2010&after) 2015     Pneumococcal 23 valent 2017     Td (Adult), Adsorbed 2017     Tdap (Adacel,Boostrix) 2007     Zoster vaccine recombinant adjuvanted (SHINGRIX) 10/21/2019, 2019       OB History    Para Term  AB Living   0 0 0 0 0 0   SAB TAB Ectopic Multiple Live Births   0 0 0 0 0       Meds  Current Outpatient Medications   Medication     acetaminophen (TYLENOL) 500 MG tablet     amLODIPine (NORVASC) 10 MG tablet     aspirin 81 MG EC tablet     atorvastatin (LIPITOR) 80 MG tablet     blood glucose (CONTOUR NEXT TEST) test strip     cholecalciferol, vitamin D3, (VITAMIN D3) 2,000 unit Tab     generic lancets     loratadine (CLARITIN) 10 mg tablet     losartan-hydrochlorothiazide (HYZAAR) 100-25 mg per tablet     omeprazole (PRILOSEC) 20 MG capsule     triamcinolone (KENALOG) 0.5 % cream     No current facility-administered medications for this visit.       Past Medical History  Past Medical History:   Diagnosis Date     Nicotine abuse 3/12/2015     Sleep apnea        Surgical History  History reviewed. No pertinent surgical history.    Allergies  Lisinopril    Family History  Family History   Problem Relation Age of Onset     Arthritis Mother      Heart Disease Mother      Diabetes Mother      Hyperlipidemia Mother      Hypertension Mother      Nephrolithiasis Mother      Heart Disease Father         Acute Anterolateral Myocardial Infarction     Diabetes Father      Hyperlipidemia Father      Coronary Artery Disease Father      Diabetes Sister      Cerebrovascular Disease Sister      Diabetes Brother      Colon Cancer Paternal Grandmother 80.00        Adenocarcinoma of the large intestine     Hypertension Brother      Coronary Artery Disease Brother      Cerebrovascular Disease Sister      Diabetes  Maternal Grandmother      Coronary Artery Disease Paternal Grandfather      Sleep Apnea Brother      No Known Problems Sister      Breast Cancer No family hx of        Social History  Social History     Socioeconomic History     Marital status: Single     Spouse name: Not on file     Number of children: 0     Years of education: Not on file     Highest education level: Not on file   Occupational History     Not on file   Tobacco Use     Smoking status: Former Smoker     Packs/day: 0.50     Years: 32.00     Pack years: 16.00     Types: Cigarettes, Cigarettes     Start date: 1983     Quit date: 2015     Years since quittin.7     Smokeless tobacco: Former User     Quit date: 2015   Vaping Use     Vaping Use: Never used   Substance and Sexual Activity     Alcohol use: Yes     Comment: Alcoholic Drinks/day: occasionally     Drug use: No     Sexual activity: Not Currently     Partners: Male     Birth control/protection: Post-menopausal   Other Topics Concern     Not on file   Social History Narrative    Lives alone in [place]- mom  .       Social Determinants of Health     Financial Resource Strain:      Difficulty of Paying Living Expenses:    Food Insecurity:      Worried About Running Out of Food in the Last Year:      Ran Out of Food in the Last Year:    Transportation Needs:      Lack of Transportation (Medical):      Lack of Transportation (Non-Medical):    Physical Activity:      Days of Exercise per Week:      Minutes of Exercise per Session:    Stress:      Feeling of Stress :    Social Connections:      Frequency of Communication with Friends and Family:      Frequency of Social Gatherings with Friends and Family:      Attends Hindu[employer] Services:      Active Member of Clubs or Organizations:      Attends Club or Organization Meetings:      Marital Status:    Intimate Partner Violence:      Fear of Current or Ex-Partner:      Emotionally Abused:      Physically Abused:      Sexually  "Abused:          Review of Systems   Complete ROS including General, HEENT, CV, Pulmonary, GI, , Genital, Neuro, Psych, MSK, Heme, Endocrine all within normal except as noted in the HPI.      Objective:   /80   Pulse 74   Temp 97.8  F (36.6  C)   Ht 1.669 m (5' 5.71\")   Wt 111.7 kg (246 lb 4 oz)   SpO2 99%   BMI 40.10 kg/m   Body mass index is 40.1 kg/m .  Wt Readings from Last 3 Encounters:   09/10/21 111.7 kg (246 lb 4 oz)   03/26/21 112.9 kg (249 lb)   09/03/20 110.2 kg (243 lb)       No flowsheet data found.      No flowsheet data found.  PHQ-2 Score:     PHQ-2 ( 1999 Pfizer) 9/10/2021 9/10/2021   Q1: Little interest or pleasure in doing things 0 0   Q2: Feeling down, depressed or hopeless 1 1   PHQ-2 Score 1 1   Q1: Little interest or pleasure in doing things Not at all -   Q2: Feeling down, depressed or hopeless Several days -   PHQ-2 Score 1 -         Complete physical exam including:  General, HEENT, Neck, breast, back, CV, Pulmonary, Abdominal, extremities, skin, neuro, psych, lymph, genital exams all within normal.    Abnormalities include:  Grossly normal exam.  Left hip painful with rotation and movement.  Also noting pain in left lower back with movement and on palpation.       Results for orders placed or performed in visit on 09/10/21   MA Screening Digital Bilateral     Status: None    Narrative    BILATERAL FULL FIELD DIGITAL SCREENING MAMMOGRAM    Performed on: 9/10/21    Compared to: 08/17/2020, 08/07/2019, 07/02/2018, 06/28/2017, 06/22/2016,   06/17/2015, 06/14/2014, 06/04/2013, 06/04/2012, 05/30/2012, 05/25/2011,   and 11/05/2010    Technique: This study was evaluated with the assistance of Computer-Aided   Detection.    Findings: The breasts have scattered areas of fibroglandular density.    There is no radiographic evidence of malignancy.     IMPRESSION: ACR BI-RADS Category 1: Negative    RECOMMENDED FOLLOW-UP: Annual routine screening mammogram    The results and " recommendations of this examination will be communicated   to the patient.     Results for orders placed or performed in visit on 09/10/21   Hemoglobin A1c     Status: Abnormal   Result Value Ref Range    Hemoglobin A1C 6.8 (H) 0.0 - 5.6 %   Comprehensive metabolic panel (BMP + Alb, Alk Phos, ALT, AST, Total. Bili, TP)     Status: Abnormal   Result Value Ref Range    Sodium 143 136 - 145 mmol/L    Potassium 3.8 3.5 - 5.0 mmol/L    Chloride 104 98 - 107 mmol/L    Carbon Dioxide (CO2) 25 22 - 31 mmol/L    Anion Gap 14 5 - 18 mmol/L    Urea Nitrogen 14 8 - 22 mg/dL    Creatinine 0.76 0.60 - 1.10 mg/dL    Calcium 9.9 8.5 - 10.5 mg/dL    Glucose 128 (H) 70 - 125 mg/dL    Alkaline Phosphatase 80 45 - 120 U/L    AST 15 0 - 40 U/L    ALT 25 0 - 45 U/L    Protein Total 7.3 6.0 - 8.0 g/dL    Albumin 4.1 3.5 - 5.0 g/dL    Bilirubin Total 0.5 0.0 - 1.0 mg/dL    GFR Estimate 87 >60 mL/min/1.73m2   Albumin Random Urine Quantitative with Creat Ratio     Status: None   Result Value Ref Range    Microalbumin Urine mg/dL 0.51 0.00 - 1.99 mg/dL    Creatinine Urine mg/dL 87 mg/dL    Microalbumin Urine mg/g Cr 5.9 <=19.9 mg/g Cr    Narrative    Microalbumin, Random Urine   <2.0 mg/dL . . . . . . . . Normal   3.0-30.0 mg/dL . . . . . . Microalbuminuria   >30.0 mg/dL . . . . . .  . Clinical Proteinuria     Microalbumin/Creatinine Ratio, Random Urine   <20 mg/g . . . . .. . . . Normal    mg/g . . . . . . . Microalbuminuria   >300 mg/g . . . . . . . . Clinical Proteinuria   UA with Microscopic reflex to Culture - Clinic Collect     Status: Normal    Specimen: Urine, Midstream   Result Value Ref Range    Color Urine Yellow Colorless, Straw, Light Yellow, Yellow    Appearance Urine Clear Clear    Glucose Urine Negative Negative mg/dL    Bilirubin Urine Negative Negative    Ketones Urine Negative Negative mg/dL    Specific Gravity Urine 1.020 1.005 - 1.030    Blood Urine Negative Negative    pH Urine 5.0 5.0 - 8.0    Protein Albumin  Urine Negative Negative mg/dL    Urobilinogen Urine 0.2 0.2, 1.0 E.U./dL    Nitrite Urine Negative Negative    Leukocyte Esterase Urine Negative Negative   TSH with free T4 reflex     Status: Normal   Result Value Ref Range    TSH 1.73 0.30 - 5.00 uIU/mL   Urine Microscopic     Status: Abnormal   Result Value Ref Range    Bacteria Urine Few (A) None Seen /HPF    RBC Urine 0-2 0-2 /HPF /HPF    WBC Urine 0-5 0-5 /HPF /HPF    Squamous Epithelials Urine Few (A) None Seen /LPF    Narrative    Urine Culture not indicated   HPV High Risk Types DNA Cervical     Status: None   Result Value Ref Range    Other HR HPV Negative Negative    HPV16 DNA Negative Negative    HPV18 DNA Negative Negative    FINAL DIAGNOSIS       This patient's sample is negative for HPV DNA.        This test was developed and its performance characteristics determined by the [facility]. It has not been cleared or approved by the FDA. The laboratory is regulated under CLIA as qualified to perform high-complexity testing. This test is used for clinical purposes. It should not be regarded as investigational or for research.    METHODOLOGY: The Roche Zoe 4800 system uses automated extraction, simultaneous amplification of HPV (L1 region) and beta-globin, followed by real time detection of fluorescent labeled HPV and beta globin using specific oligonucleotide probes. The test specifically identified types HPV 16 DNA and HPV 18 DNA while concurrently detecting the rest of the high risk types (31, 33, 35, 39, 45, 51, 52, 56, 58, 59, 66 or 68).    COMMENTS: This test is not intended for use as a screening device for woman under age 30 with normal cervical cytology. Results should be correlated with cytologic and histologic findings. Close clinical followup is recommended.             Madisyn Stacy MD  [facility]

## 2021-09-14 ENCOUNTER — MYC MEDICAL ADVICE (OUTPATIENT)
Dept: FAMILY MEDICINE | Facility: CLINIC | Age: 56
End: 2021-09-14

## 2021-09-14 LAB
HUMAN PAPILLOMA VIRUS 16 DNA: NEGATIVE
HUMAN PAPILLOMA VIRUS 18 DNA: NEGATIVE
HUMAN PAPILLOMA VIRUS FINAL DIAGNOSIS: NORMAL
HUMAN PAPILLOMA VIRUS OTHER HR: NEGATIVE

## 2021-09-20 LAB
BKR LAB AP GYN ADEQUACY: NORMAL
BKR LAB AP GYN INTERPRETATION: NORMAL
BKR LAB AP HPV REFLEX: NORMAL
BKR LAB AP PREVIOUS ABNORMAL: NORMAL
PATH REPORT.COMMENTS IMP SPEC: NORMAL
PATH REPORT.RELEVANT HX SPEC: NORMAL

## 2021-09-24 ENCOUNTER — MYC MEDICAL ADVICE (OUTPATIENT)
Dept: FAMILY MEDICINE | Facility: CLINIC | Age: 56
End: 2021-09-24
Payer: COMMERCIAL

## 2021-09-24 DIAGNOSIS — G89.29 CHRONIC LEFT-SIDED LOW BACK PAIN WITH LEFT-SIDED SCIATICA: Primary | ICD-10-CM

## 2021-09-24 DIAGNOSIS — M54.42 CHRONIC LEFT-SIDED LOW BACK PAIN WITH LEFT-SIDED SCIATICA: Primary | ICD-10-CM

## 2021-09-25 DIAGNOSIS — I10 ESSENTIAL HYPERTENSION, BENIGN: ICD-10-CM

## 2021-09-26 RX ORDER — LOSARTAN POTASSIUM AND HYDROCHLOROTHIAZIDE 25; 100 MG/1; MG/1
TABLET ORAL
Qty: 90 TABLET | Refills: 1 | Status: SHIPPED | OUTPATIENT
Start: 2021-09-26 | End: 2022-03-22

## 2021-09-26 NOTE — TELEPHONE ENCOUNTER
"Last Written Prescription Date:  9/22/20  Last Fill Quantity: 90,  # refills: 4   Last office visit provider: 4/13/21     Requested Prescriptions   Pending Prescriptions Disp Refills     losartan-hydrochlorothiazide (HYZAAR) 100-25 MG tablet [Pharmacy Med Name: LOSARTAN/HCTZ 100/25MG TABLETS] 90 tablet 4     Sig: TAKE 1 TABLET BY MOUTH DAILY       Angiotensin-II Receptors Passed - 9/25/2021  5:39 PM        Passed - Last blood pressure under 140/90 in past 12 months     BP Readings from Last 3 Encounters:   09/10/21 136/80   03/26/21 132/82   09/03/20 131/79                 Passed - Recent (12 mo) or future (30 days) visit within the authorizing provider's specialty     Patient has had an office visit with the authorizing provider or a provider within the authorizing providers department within the previous 12 mos or has a future within next 30 days. See \"Patient Info\" tab in inbasket, or \"Choose Columns\" in Meds & Orders section of the refill encounter.              Passed - Medication is active on med list        Passed - Patient is age 18 or older        Passed - No active pregnancy on record        Passed - Normal serum creatinine on file in past 12 months     Recent Labs   Lab Test 09/10/21  0939   CR 0.76       Ok to refill medication if creatinine is low          Passed - Normal serum potassium on file in past 12 months     Recent Labs   Lab Test 09/10/21  0939   POTASSIUM 3.8                    Passed - No positive pregnancy test in past 12 months       Diuretics (Including Combos) Protocol Passed - 9/25/2021  5:39 PM        Passed - Blood pressure under 140/90 in past 12 months     BP Readings from Last 3 Encounters:   09/10/21 136/80   03/26/21 132/82   09/03/20 131/79                 Passed - Recent (12 mo) or future (30 days) visit within the authorizing provider's specialty     Patient has had an office visit with the authorizing provider or a provider within the authorizing providers department within " "the previous 12 mos or has a future within next 30 days. See \"Patient Info\" tab in inbasket, or \"Choose Columns\" in Meds & Orders section of the refill encounter.              Passed - Medication is active on med list        Passed - Patient is age 18 or older        Passed - No active pregancy on record        Passed - Normal serum creatinine on file in past 12 months     Recent Labs   Lab Test 09/10/21  0939   CR 0.76              Passed - Normal serum potassium on file in past 12 months     Recent Labs   Lab Test 09/10/21  0939   POTASSIUM 3.8                    Passed - Normal serum sodium on file in past 12 months     Recent Labs   Lab Test 09/10/21  0939                 Passed - No positive pregnancy test in past 12 months             jaida moore RN 09/26/21 6:25 AM  "

## 2021-10-05 DIAGNOSIS — E11.9 TYPE 2 DIABETES MELLITUS WITHOUT COMPLICATION, WITHOUT LONG-TERM CURRENT USE OF INSULIN (H): ICD-10-CM

## 2021-10-05 DIAGNOSIS — E78.2 MIXED HYPERLIPIDEMIA: ICD-10-CM

## 2021-10-05 RX ORDER — ATORVASTATIN CALCIUM 80 MG/1
TABLET, FILM COATED ORAL
Qty: 90 TABLET | Refills: 1 | Status: SHIPPED | OUTPATIENT
Start: 2021-10-05 | End: 2022-03-31

## 2021-10-05 NOTE — TELEPHONE ENCOUNTER
"Last Written Prescription Date:  9/4/2020  Last Fill Quantity: 90,  # refills: 4   Last office visit provider:  9/10/21     Requested Prescriptions   Pending Prescriptions Disp Refills     atorvastatin (LIPITOR) 80 MG tablet [Pharmacy Med Name: ATORVASTATIN 80MG TABLETS] 90 tablet 4     Sig: TAKE 1 TABLET(80 MG) BY MOUTH DAILY       Statins Protocol Passed - 10/5/2021  6:34 AM        Passed - LDL on file in past 12 months     Recent Labs   Lab Test 03/26/21  0926                Passed - No abnormal creatine kinase in past 12 months     No lab results found.             Passed - Recent (12 mo) or future (30 days) visit within the authorizing provider's specialty     Patient has had an office visit with the authorizing provider or a provider within the authorizing providers department within the previous 12 mos or has a future within next 30 days. See \"Patient Info\" tab in inbasket, or \"Choose Columns\" in Meds & Orders section of the refill encounter.              Passed - Medication is active on med list        Passed - Patient is age 18 or older        Passed - No active pregnancy on record        Passed - No positive pregnancy test in past 12 months             North Carrillo RN 10/05/21 2:07 PM  "

## 2021-11-16 ENCOUNTER — MYC MEDICAL ADVICE (OUTPATIENT)
Dept: FAMILY MEDICINE | Facility: CLINIC | Age: 56
End: 2021-11-16
Payer: COMMERCIAL

## 2021-11-30 ENCOUNTER — IMMUNIZATION (OUTPATIENT)
Dept: NURSING | Facility: CLINIC | Age: 56
End: 2021-11-30
Payer: COMMERCIAL

## 2021-11-30 PROCEDURE — 0004A PR COVID VAC PFIZER DIL RECON 30 MCG/0.3 ML IM: CPT

## 2021-11-30 PROCEDURE — 91300 PR COVID VAC PFIZER DIL RECON 30 MCG/0.3 ML IM: CPT

## 2021-12-19 DIAGNOSIS — I10 ESSENTIAL HYPERTENSION, BENIGN: ICD-10-CM

## 2021-12-21 RX ORDER — AMLODIPINE BESYLATE 10 MG/1
TABLET ORAL
Qty: 90 TABLET | Refills: 3 | Status: SHIPPED | OUTPATIENT
Start: 2021-12-21 | End: 2022-09-26

## 2021-12-21 NOTE — TELEPHONE ENCOUNTER
"Routing refill request to provider for review/approval because:  A break in medication    Last Written Prescription Date:  6/8/20  Last Fill Quantity: 90,  # refills: 3   Last office visit provider:  9/10/21     Requested Prescriptions   Pending Prescriptions Disp Refills     amLODIPine (NORVASC) 10 MG tablet [Pharmacy Med Name: AMLODIPINE BESYLATE 10MG TABLETS] 90 tablet 3     Sig: TAKE 1 TABLET(10 MG) BY MOUTH DAILY       Calcium Channel Blockers Protocol  Passed - 12/19/2021 10:56 AM        Passed - Blood pressure under 140/90 in past 12 months     BP Readings from Last 3 Encounters:   09/10/21 136/80   03/26/21 132/82   09/03/20 131/79                 Passed - Recent (12 mo) or future (30 days) visit within the authorizing provider's specialty     Patient has had an office visit with the authorizing provider or a provider within the authorizing providers department within the previous 12 mos or has a future within next 30 days. See \"Patient Info\" tab in inbasket, or \"Choose Columns\" in Meds & Orders section of the refill encounter.              Passed - Medication is active on med list        Passed - Patient is age 18 or older        Passed - No active pregnancy on record        Passed - Normal serum creatinine on file in past 12 months     Recent Labs   Lab Test 09/10/21  0939   CR 0.76       Ok to refill medication if creatinine is low          Passed - No positive pregnancy test in past 12 months             Brien Youngblood RN 12/21/21 10:31 AM  "

## 2021-12-28 ENCOUNTER — OFFICE VISIT (OUTPATIENT)
Dept: PHYSICAL MEDICINE AND REHAB | Facility: CLINIC | Age: 56
End: 2021-12-28
Attending: FAMILY MEDICINE
Payer: COMMERCIAL

## 2021-12-28 VITALS
TEMPERATURE: 98 F | BODY MASS INDEX: 39.86 KG/M2 | WEIGHT: 248 LBS | HEIGHT: 66 IN | HEART RATE: 72 BPM | SYSTOLIC BLOOD PRESSURE: 150 MMHG | DIASTOLIC BLOOD PRESSURE: 70 MMHG

## 2021-12-28 DIAGNOSIS — M54.42 CHRONIC LEFT-SIDED LOW BACK PAIN WITH LEFT-SIDED SCIATICA: ICD-10-CM

## 2021-12-28 DIAGNOSIS — M79.18 MYOFASCIAL PAIN: ICD-10-CM

## 2021-12-28 DIAGNOSIS — M16.0 PRIMARY OSTEOARTHRITIS OF BOTH HIPS: Primary | ICD-10-CM

## 2021-12-28 DIAGNOSIS — G89.29 CHRONIC LEFT-SIDED LOW BACK PAIN WITH LEFT-SIDED SCIATICA: ICD-10-CM

## 2021-12-28 PROCEDURE — 99204 OFFICE O/P NEW MOD 45 MIN: CPT | Performed by: PAIN MEDICINE

## 2021-12-28 ASSESSMENT — MIFFLIN-ST. JEOR: SCORE: 1721.67

## 2021-12-28 ASSESSMENT — PAIN SCALES - GENERAL: PAINLEVEL: NO PAIN (1)

## 2021-12-28 NOTE — LETTER
12/28/2021         RE: Zaina Shaikh  [address]  [place] [address]        Dear Colleague,    Thank you for referring your patient, Zaina Shaikh, to the [facility] [place]. Please see a copy of my visit note below.    ASSESSMENT: Zaina Shaikh is a 56 year old female who presents for consultation at the request of [facility] PCP Madisyn Stacy, with a past medical history significant for  hearing loss, chronic left-sided low back pain with left-sided sciatica, sleep apnea, vitamin D deficiency, heartburn, obesity, hyperlipidemia, type 2 diabetes mellitus without complication, hypertension, hot flashes, osteoarthritis of the knee, eczema, biceps tendinopathy of the right, primary osteoarthritis of both hips, nicotine abuse who presents today for new patient evaluation of chronic low back and left lower extremity pain:    -Overall patient's physical exam is reassuring that she has normal strength and reflexes in her lower extremities.  She does have positive hip joint provocative maneuvers including Jameel, Stinchfield, and decreased range of motion in internal rotation with reproduction of pain.    Patient is neurologically intact on exam. No myelopathic or red flag symptoms.     Oswestry ([place]) Questionnaire    No flowsheet data found.    Neck Disability Index:  No flowsheet data found.    Diagnoses and all orders for this visit:  Primary osteoarthritis of both hips  -     PAIN US Large Joint Injection Unilateral; Future  Chronic left-sided low back pain with left-sided sciatica  -     Spine Referral  Myofascial pain  -     PAIN US Large Joint Injection Unilateral; Future    PLAN:  Reviewed spine anatomy and disease process. Discussed diagnosis and treatment options with the patient today. A shared decision making model was used.  The patient's values and choices were respected. The following represents what was discussed and decided upon by the provider and the  patient.      -DIAGNOSTIC TESTS:  Images were personally reviewed and interpreted and explained to patient today using spine model.   --X-rays of bilateral hips dated 3/26/2021 is personally reviewed images interpreted and discussed with patient.  There is moderate to severe degenerative changes in both hips.    -PHYSICAL THERAPY: She has had 1 session of physical therapy for her groin area pain.  While she was doing exercises that seem to help.  I encouraged her to restart doing these.  Discussed the importance of core strengthening, ROM, stretching exercises with the patient and how each of these entities is important in decreasing pain.  Explained to the patient that the purpose of physical therapy is to teach the patient a home exercise program.  These exercises need to be performed every day in order to decrease pain and prevent future occurrences of pain.        -MEDICATIONS: No changes to medications.  -  Discussed multiple medication options today with patient. Discussed risks, side effects, and proper use of medications. Patient verbalized understanding.    -INTERVENTIONS: I ordered a left ultrasound-guided hip joint injection for her.  Discussed risks and benefits of injections with patient today.    -PATIENT EDUCATION: We discussed pain management in a multimodal fashion including physical therapy, medication management, possible future injections.    -FOLLOW-UP:   Patient will follow up 2 weeks after injection.    Advised patient to call the Spine Center if symptoms worsen or you have problems controlling bladder and bowel function.   ______________________________________________________________________    SUBJECTIVE:  HPI:  Zaina Shaikh  Is a 56 year old female who presents today for new patient evaluation of low back pain and left lower extremity pain.  The patient was seen by her primary care provider on 9/10/2021 with chronic low back and left lower extremity pain.  MRI of the lumbar spine was  obtained and she was referred to the spine center for further evaluation.  Patient notes that MRI was denied by insurance.  She has been having left groin area pain for quite some time.  She notes that in April 2021 she had 1 session of physical therapy for the left groin area pain.  The exercises seem to help.  She did these for about a month.  She notes that she did not go back to physical therapy as the co-pays for high.  This summer she was playing corn hole and strained her left low back.  She notes that her pain is worse when she crosses her left leg over her right or if she has been sitting for a long time and stands up.  Prolonged walking also worsens pain.  Sitting and resting to help with pain.  Her pain today is 1/10 is worst is 7/10 is best is 1/10.  Reports that her groin area were she is predominantly having her pain.  Tylenol is helpful.  She denies any bowel or bladder changes, fevers, chills, unintentional weight loss.    -Treatment to Date: X-rays of bilateral hips dated 3/26/2021.  1 session of physical therapy.    -Medications:    Current Outpatient Medications   Medication     acetaminophen (TYLENOL) 500 MG tablet     amLODIPine (NORVASC) 10 MG tablet     aspirin 81 MG EC tablet     atorvastatin (LIPITOR) 80 MG tablet     blood glucose (CONTOUR NEXT TEST) test strip     cholecalciferol, vitamin D3, (VITAMIN D3) 2,000 unit Tab     generic lancets     loratadine (CLARITIN) 10 mg tablet     losartan-hydrochlorothiazide (HYZAAR) 100-25 MG tablet     omeprazole (PRILOSEC) 20 MG capsule     triamcinolone (KENALOG) 0.5 % cream     No current facility-administered medications for this visit.       Allergies   Allergen Reactions     Lisinopril Cough       Past Medical History:   Diagnosis Date     Nicotine abuse 3/12/2015     Sleep apnea         Patient Active Problem List   Diagnosis     Osteoarthrosis Of The Knee     Vitamin D Deficiency     Hyperlipidemia     Hearing Loss     Benign Essential  "Hypertension     Heartburn     Eczema     Type 2 diabetes mellitus without complication (H)     Biceps tendinopathy, right     Hot flashes     DELL (obstructive sleep apnea)     Class 3 severe obesity due to excess calories with serious comorbidity and body mass index (BMI) of 40.0 to 44.9 in adult (H)     Family history of atherosclerosis     Primary osteoarthritis of both hips     Post-void dribbling     Chronic left-sided low back pain with left-sided sciatica       No past surgical history on file.    Family History   Problem Relation Age of Onset     Arthritis Mother      Heart Disease Mother      Diabetes Mother      Hyperlipidemia Mother      Hypertension Mother      Nephrolithiasis Mother      Heart Disease Father         Acute Anterolateral Myocardial Infarction     Diabetes Father      Hyperlipidemia Father      Coronary Artery Disease Father      Diabetes Sister      Cerebrovascular Disease Sister      Diabetes Brother      Colon Cancer Paternal Grandmother 80.00        Adenocarcinoma of the large intestine     Hypertension Brother      Coronary Artery Disease Brother      Cerebrovascular Disease Sister      Diabetes Maternal Grandmother      Coronary Artery Disease Paternal Grandfather      Sleep Apnea Brother      No Known Problems Sister      Breast Cancer No family hx of        Reviewed past medical, surgical, and family history with patient found on new patient intake packet located in EMR Media tab.     SOCIAL HX: She denies smoking or using recreational drugs.  She drinks alcohol a couple of times per year.    ROS: Specifically negative for bowel/bladder dysfunction, balance changes, headache, dizziness, foot drop, fevers, chills, appetite changes, nausea/vomiting, unexplained weight loss. Otherwise 13 systems reviewed are negative. Please see the patient's intake questionnaire from today for details.    OBJECTIVE:  BP (!) 150/70   Pulse 72   Temp 98  F (36.7  C)   Ht 5' 6\" (1.676 m)   Wt 248 lb " (112.5 kg)   BMI 40.03 kg/m      PHYSICAL EXAMINATION:    --CONSTITUTIONAL:  Vital signs as above.  No acute distress.  The patient is well nourished and well groomed.  --PSYCHIATRIC:  Appropriate mood and affect. The patient is awake, alert, oriented to person, place, time and answering questions appropriately with clear speech.    --SKIN:  Skin over the face, bilateral lower extremities, and posterior torso is clean, dry, intact without rashes.    --RESPIRATORY: Normal rhythm and effort. No abnormal accessory muscle breathing patterns noted.   --STANDING EXAMINATION:  Normal lumbar lordosis noted, no lateral shift.  --MUSCULOSKELETAL: Lumbar spine inspection reveals no evidence of deformity. Range of motion is not limited in lumbar flexion, extension, lateral rotation. No tenderness to palpation lumbar spine. Straight leg raising in the supine position is negative to radicular pain.   --SACROILIAC JOINT: Negative distraction.  Positive on the left for groin pain Jameel's with reproduction of pain to affected extremity.  One Finger point test negative.  --GROSS MOTOR: Gait is antalgic. Easily arises from a seated position. Toe walking and heel walking are normal without significant difficulty.    --LOWER EXTREMITY MOTOR TESTING:  Plantar flexion left 5/5, right 5/5   Dorsiflexion left 5/5, right 5/5   Great toe MTP extension left 5/5, right 5/5  Knee flexion left 5/5, right 5/5  Knee extension left 5/5, right 5/5   Hip flexion left 5/5, right 5/5  Hip abduction left 5/5, right 5/5  Hip adduction left 5/5, right 5/5   --HIPS: Decreased hip internal rotation range of motion with increased pain on the left.   Stinchfield test is positive on the left for groin pain.  --NEUROLOGICAL:  2/4 patellar and achilles reflexes bilaterally.  Sensation to light touch is intact in the bilateral L4, L5, and S1 dermatomes. Babinski is negative. No clonus.   --VASCULAR:  2/4 dorsalis pedis  pulses bilaterally.  Bilateral lower  extremities are warm.  There is no pitting edema of the bilateral lower extremities.    RESULTS: Prior medical records from [facility] primary care provider were reviewed today.    Imaging: Hip imaging was personally reviewed and interpreted today.                                   Again, thank you for allowing me to participate in the care of your patient.        Sincerely,        Frank Penaloza, DO

## 2021-12-28 NOTE — PATIENT INSTRUCTIONS
I ordered a left hip joint injection under ultrasound guidance for you.    ~Please call Nurse Navigation line [phone] with any questions or concerns about your treatment plan, if symptoms worsen and you would like to be seen urgently, or if you have problems controlling bladder and bowel function.

## 2021-12-28 NOTE — PROGRESS NOTES
ASSESSMENT: Zaina Shaikh is a 56 year old female who presents for consultation at the request of [facility] PCP Madisyn Stacy, with a past medical history significant for  hearing loss, chronic left-sided low back pain with left-sided sciatica, sleep apnea, vitamin D deficiency, heartburn, obesity, hyperlipidemia, type 2 diabetes mellitus without complication, hypertension, hot flashes, osteoarthritis of the knee, eczema, biceps tendinopathy of the right, primary osteoarthritis of both hips, nicotine abuse who presents today for new patient evaluation of chronic low back and left lower extremity pain:    -Overall patient's physical exam is reassuring that she has normal strength and reflexes in her lower extremities.  She does have positive hip joint provocative maneuvers including Jameel, Stinchfield, and decreased range of motion in internal rotation with reproduction of pain.    Patient is neurologically intact on exam. No myelopathic or red flag symptoms.     Oswestry ([place]) Questionnaire    No flowsheet data found.    Neck Disability Index:  No flowsheet data found.    Diagnoses and all orders for this visit:  Primary osteoarthritis of both hips  -     PAIN US Large Joint Injection Unilateral; Future  Chronic left-sided low back pain with left-sided sciatica  -     Spine Referral  Myofascial pain  -     PAIN US Large Joint Injection Unilateral; Future    PLAN:  Reviewed spine anatomy and disease process. Discussed diagnosis and treatment options with the patient today. A shared decision making model was used.  The patient's values and choices were respected. The following represents what was discussed and decided upon by the provider and the patient.      -DIAGNOSTIC TESTS:  Images were personally reviewed and interpreted and explained to patient today using spine model.   --X-rays of bilateral hips dated 3/26/2021 is personally reviewed images interpreted and discussed with patient.  There is moderate  to severe degenerative changes in both hips.    -PHYSICAL THERAPY: She has had 1 session of physical therapy for her groin area pain.  While she was doing exercises that seem to help.  I encouraged her to restart doing these.  Discussed the importance of core strengthening, ROM, stretching exercises with the patient and how each of these entities is important in decreasing pain.  Explained to the patient that the purpose of physical therapy is to teach the patient a home exercise program.  These exercises need to be performed every day in order to decrease pain and prevent future occurrences of pain.        -MEDICATIONS: No changes to medications.  -  Discussed multiple medication options today with patient. Discussed risks, side effects, and proper use of medications. Patient verbalized understanding.    -INTERVENTIONS: I ordered a left ultrasound-guided hip joint injection for her.  Discussed risks and benefits of injections with patient today.    -PATIENT EDUCATION: We discussed pain management in a multimodal fashion including physical therapy, medication management, possible future injections.    -FOLLOW-UP:   Patient will follow up 2 weeks after injection.    Advised patient to call the Spine Center if symptoms worsen or you have problems controlling bladder and bowel function.   ______________________________________________________________________    SUBJECTIVE:  HPI:  Zaina Shaikh  Is a 56 year old female who presents today for new patient evaluation of low back pain and left lower extremity pain.  The patient was seen by her primary care provider on 9/10/2021 with chronic low back and left lower extremity pain.  MRI of the lumbar spine was obtained and she was referred to the spine center for further evaluation.  Patient notes that MRI was denied by insurance.  She has been having left groin area pain for quite some time.  She notes that in April 2021 she had 1 session of physical therapy for the left  groin area pain.  The exercises seem to help.  She did these for about a month.  She notes that she did not go back to physical therapy as the co-pays for high.  This summer she was playing corn hole and strained her left low back.  She notes that her pain is worse when she crosses her left leg over her right or if she has been sitting for a long time and stands up.  Prolonged walking also worsens pain.  Sitting and resting to help with pain.  Her pain today is 1/10 is worst is 7/10 is best is 1/10.  Reports that her groin area were she is predominantly having her pain.  Tylenol is helpful.  She denies any bowel or bladder changes, fevers, chills, unintentional weight loss.    -Treatment to Date: X-rays of bilateral hips dated 3/26/2021.  1 session of physical therapy.    -Medications:    Current Outpatient Medications   Medication     acetaminophen (TYLENOL) 500 MG tablet     amLODIPine (NORVASC) 10 MG tablet     aspirin 81 MG EC tablet     atorvastatin (LIPITOR) 80 MG tablet     blood glucose (CONTOUR NEXT TEST) test strip     cholecalciferol, vitamin D3, (VITAMIN D3) 2,000 unit Tab     generic lancets     loratadine (CLARITIN) 10 mg tablet     losartan-hydrochlorothiazide (HYZAAR) 100-25 MG tablet     omeprazole (PRILOSEC) 20 MG capsule     triamcinolone (KENALOG) 0.5 % cream     No current facility-administered medications for this visit.       Allergies   Allergen Reactions     Lisinopril Cough       Past Medical History:   Diagnosis Date     Nicotine abuse 3/12/2015     Sleep apnea         Patient Active Problem List   Diagnosis     Osteoarthrosis Of The Knee     Vitamin D Deficiency     Hyperlipidemia     Hearing Loss     Benign Essential Hypertension     Heartburn     Eczema     Type 2 diabetes mellitus without complication (H)     Biceps tendinopathy, right     Hot flashes     DELL (obstructive sleep apnea)     Class 3 severe obesity due to excess calories with serious comorbidity and body mass index (BMI)  "of 40.0 to 44.9 in adult (H)     Family history of atherosclerosis     Primary osteoarthritis of both hips     Post-void dribbling     Chronic left-sided low back pain with left-sided sciatica       No past surgical history on file.    Family History   Problem Relation Age of Onset     Arthritis Mother      Heart Disease Mother      Diabetes Mother      Hyperlipidemia Mother      Hypertension Mother      Nephrolithiasis Mother      Heart Disease Father         Acute Anterolateral Myocardial Infarction     Diabetes Father      Hyperlipidemia Father      Coronary Artery Disease Father      Diabetes Sister      Cerebrovascular Disease Sister      Diabetes Brother      Colon Cancer Paternal Grandmother 80.00        Adenocarcinoma of the large intestine     Hypertension Brother      Coronary Artery Disease Brother      Cerebrovascular Disease Sister      Diabetes Maternal Grandmother      Coronary Artery Disease Paternal Grandfather      Sleep Apnea Brother      No Known Problems Sister      Breast Cancer No family hx of        Reviewed past medical, surgical, and family history with patient found on new patient intake packet located in EMR Media tab.     SOCIAL HX: She denies smoking or using recreational drugs.  She drinks alcohol a couple of times per year.    ROS: Specifically negative for bowel/bladder dysfunction, balance changes, headache, dizziness, foot drop, fevers, chills, appetite changes, nausea/vomiting, unexplained weight loss. Otherwise 13 systems reviewed are negative. Please see the patient's intake questionnaire from today for details.    OBJECTIVE:  BP (!) 150/70   Pulse 72   Temp 98  F (36.7  C)   Ht 5' 6\" (1.676 m)   Wt 248 lb (112.5 kg)   BMI 40.03 kg/m      PHYSICAL EXAMINATION:    --CONSTITUTIONAL:  Vital signs as above.  No acute distress.  The patient is well nourished and well groomed.  --PSYCHIATRIC:  Appropriate mood and affect. The patient is awake, alert, oriented to person, place, " time and answering questions appropriately with clear speech.    --SKIN:  Skin over the face, bilateral lower extremities, and posterior torso is clean, dry, intact without rashes.    --RESPIRATORY: Normal rhythm and effort. No abnormal accessory muscle breathing patterns noted.   --STANDING EXAMINATION:  Normal lumbar lordosis noted, no lateral shift.  --MUSCULOSKELETAL: Lumbar spine inspection reveals no evidence of deformity. Range of motion is not limited in lumbar flexion, extension, lateral rotation. No tenderness to palpation lumbar spine. Straight leg raising in the supine position is negative to radicular pain.   --SACROILIAC JOINT: Negative distraction.  Positive on the left for groin pain Jameel's with reproduction of pain to affected extremity.  One Finger point test negative.  --GROSS MOTOR: Gait is antalgic. Easily arises from a seated position. Toe walking and heel walking are normal without significant difficulty.    --LOWER EXTREMITY MOTOR TESTING:  Plantar flexion left 5/5, right 5/5   Dorsiflexion left 5/5, right 5/5   Great toe MTP extension left 5/5, right 5/5  Knee flexion left 5/5, right 5/5  Knee extension left 5/5, right 5/5   Hip flexion left 5/5, right 5/5  Hip abduction left 5/5, right 5/5  Hip adduction left 5/5, right 5/5   --HIPS: Decreased hip internal rotation range of motion with increased pain on the left.   Stinchfield test is positive on the left for groin pain.  --NEUROLOGICAL:  2/4 patellar and achilles reflexes bilaterally.  Sensation to light touch is intact in the bilateral L4, L5, and S1 dermatomes. Babinski is negative. No clonus.   --VASCULAR:  2/4 dorsalis pedis  pulses bilaterally.  Bilateral lower extremities are warm.  There is no pitting edema of the bilateral lower extremities.    RESULTS: Prior medical records from [facility] primary care provider were reviewed today.    Imaging: Hip imaging was personally reviewed and interpreted today.

## 2022-01-11 ENCOUNTER — ANCILLARY PROCEDURE (OUTPATIENT)
Dept: PHYSICAL MEDICINE AND REHAB | Facility: CLINIC | Age: 57
End: 2022-01-11
Attending: PAIN MEDICINE
Payer: COMMERCIAL

## 2022-01-11 VITALS
HEART RATE: 80 BPM | WEIGHT: 247 LBS | SYSTOLIC BLOOD PRESSURE: 146 MMHG | BODY MASS INDEX: 39.87 KG/M2 | DIASTOLIC BLOOD PRESSURE: 69 MMHG

## 2022-01-11 DIAGNOSIS — M16.0 PRIMARY OSTEOARTHRITIS OF BOTH HIPS: ICD-10-CM

## 2022-01-11 DIAGNOSIS — M79.18 MYOFASCIAL PAIN: ICD-10-CM

## 2022-01-11 PROCEDURE — 20611 DRAIN/INJ JOINT/BURSA W/US: CPT | Mod: LT | Performed by: PAIN MEDICINE

## 2022-01-11 RX ORDER — ROPIVACAINE HYDROCHLORIDE 5 MG/ML
INJECTION, SOLUTION EPIDURAL; INFILTRATION; PERINEURAL
Status: COMPLETED | OUTPATIENT
Start: 2022-01-11 | End: 2022-01-11

## 2022-01-11 RX ORDER — METHYLPREDNISOLONE ACETATE 40 MG/ML
INJECTION, SUSPENSION INTRA-ARTICULAR; INTRALESIONAL; INTRAMUSCULAR; SOFT TISSUE
Status: COMPLETED | OUTPATIENT
Start: 2022-01-11 | End: 2022-01-11

## 2022-01-11 RX ORDER — LIDOCAINE HYDROCHLORIDE 10 MG/ML
INJECTION, SOLUTION EPIDURAL; INFILTRATION; INTRACAUDAL; PERINEURAL
Status: COMPLETED | OUTPATIENT
Start: 2022-01-11 | End: 2022-01-11

## 2022-01-11 RX ADMIN — METHYLPREDNISOLONE ACETATE 40 MG: 40 INJECTION, SUSPENSION INTRA-ARTICULAR; INTRALESIONAL; INTRAMUSCULAR; SOFT TISSUE at 08:48

## 2022-01-11 RX ADMIN — LIDOCAINE HYDROCHLORIDE 2 ML: 10 INJECTION, SOLUTION EPIDURAL; INFILTRATION; INTRACAUDAL; PERINEURAL at 08:47

## 2022-01-11 RX ADMIN — ROPIVACAINE HYDROCHLORIDE 4 ML: 5 INJECTION, SOLUTION EPIDURAL; INFILTRATION; PERINEURAL at 08:48

## 2022-01-11 ASSESSMENT — PAIN SCALES - GENERAL
PAINLEVEL: SEVERE PAIN (6)
PAINLEVEL: MILD PAIN (3)

## 2022-01-11 NOTE — PATIENT INSTRUCTIONS
DISCHARGE INSTRUCTIONS    During office hours (8:00 a.m.- 4:00 p.m.) questions or concerns may be answered  by calling Spine Center Navigation Nurses at  [phone].  Messages received after hours will be returned the following business day.      In the case of an emergency, please dial 911 or seek assistance at the nearest Emergency Room/Urgent Care facility.     All Patients:  ? You may experience an increase in your symptoms for the first 2 days (It may take anywhere between 2 days- 2 weeks for the steroid to have maximum effect).    ? You may use ice on the injection site, as frequently as 20 minutes each hour if needed.    ? You may take your pain medicine.    ? You may continue taking your regular medication.    ? You may shower. No swimming, tub bath or hot tub for 48 hours.  You may remove your bandaid/bandage as soon as you are home.    ? You may resume light activities, as tolerated.    ? Resume your usual diet as tolerated.    ? It is strongly advised that you do not drive for 1-3 hours post injection.    ? If you have had oral sedation:  Do not drive for 8 hours post injection.      ? If you have had IV sedation:  Do not drive for 24 hours post injection.  Do not operate hazardous machinery or make important personal/business decisions for 24 hours.    POSSIBLE STEROID SIDE EFFECTS (If steroid/cortisone was used for your procedure)    -If you experience these symptoms, it should only last for a short period      Swelling of the legs                Skin redness (flushing)       Mouth (oral) irritation     Blood sugar (glucose) levels              Sweats                     Mood changes    Headache    Weakened immune system for up to 14 days, which could increase the risk of araseli the COVID-19 virus and/or experiencing more severe symptoms of the disease, if exposed.         POSSIBLE PROCEDURE SIDE EFFECTS    -Call the Spine Center if you are concerned      Increased Pain             Increased  numbness/tingling        Nausea/Vomiting            Bruising/bleeding at site        Redness or swelling                                                Difficulty walking        Weakness            Fever greater than 100.5    *In the event of a severe headache after an epidural steroid injection that is relieved by lying down, please call the [facility] to speak with a clinical staff member*

## 2022-01-21 DIAGNOSIS — L30.9 DERMATITIS: ICD-10-CM

## 2022-01-24 RX ORDER — TRIAMCINOLONE ACETONIDE 5 MG/G
CREAM TOPICAL
Qty: 30 G | Refills: 6 | Status: SHIPPED | OUTPATIENT
Start: 2022-01-24

## 2022-01-24 NOTE — TELEPHONE ENCOUNTER
"Routing refill request to provider for review/approval because:  Medication warning  Break in medication    Last Written Prescription Date:  3/15/2020  Last Fill Quantity: 30g,  # refills: 6   Last office visit provider:  9/10/21     Requested Prescriptions   Pending Prescriptions Disp Refills     triamcinolone (ARISTOCORT HP) 0.5 % external cream [Pharmacy Med Name: TRIAMCINOLONE 0.5% CREAM 15GM] 30 g 6     Sig: APPLY SPARINGLY TO THE AFFECTED AREA AND MASSAGE IN TWICE DAILY       Topical Steroids and Nonsteroidals Protocol Failed - 1/21/2022  6:55 PM        Failed - High potency steroid not ordered        Passed - Patient is age 6 or older        Passed - Authorizing prescriber's most recent note related to this medication read.     If refill request is for ophthalmic use, please forward request to provider for approval.          Passed - Recent (12 mo) or future (30 days) visit within the authorizing provider's specialty     Patient has had an office visit with the authorizing provider or a provider within the authorizing providers department within the previous 12 mos or has a future within next 30 days. See \"Patient Info\" tab in inbasket, or \"Choose Columns\" in Meds & Orders section of the refill encounter.              Passed - Medication is active on med list             Gbabie Olivas RN 01/24/22 1:00 PM  "

## 2022-01-31 ENCOUNTER — OFFICE VISIT (OUTPATIENT)
Dept: PHYSICAL MEDICINE AND REHAB | Facility: CLINIC | Age: 57
End: 2022-01-31
Payer: COMMERCIAL

## 2022-01-31 VITALS — HEART RATE: 75 BPM | DIASTOLIC BLOOD PRESSURE: 73 MMHG | SYSTOLIC BLOOD PRESSURE: 152 MMHG | OXYGEN SATURATION: 98 %

## 2022-01-31 DIAGNOSIS — M79.18 MYOFASCIAL PAIN: ICD-10-CM

## 2022-01-31 DIAGNOSIS — M16.0 PRIMARY OSTEOARTHRITIS OF BOTH HIPS: Primary | ICD-10-CM

## 2022-01-31 PROCEDURE — 99213 OFFICE O/P EST LOW 20 MIN: CPT | Performed by: PAIN MEDICINE

## 2022-01-31 ASSESSMENT — PAIN SCALES - GENERAL: PAINLEVEL: NO PAIN (1)

## 2022-01-31 NOTE — PROGRESS NOTES
Assessment:     Diagnoses and all orders for this visit:  Primary osteoarthritis of both hips  Myofascial pain     Zaina Shaikh is a 56 year old y.o. female with past medical history significant for hearing loss, chronic left-sided low back pain with left-sided sciatica, sleep apnea, vitamin D deficiency, heartburn, obesity, hyperlipidemia, type 2 diabetes mellitus without complication, hypertension, hot flashes, osteoarthritis of the knee, eczema, biceps tendinopathy of the right, primary osteoarthritis of both hips, nicotine abuse who presents today for follow-up regarding chronic low back and left lower extremity pain:    -Patient had 95% relief with her left hip joint injection.  She is feeling much better and getting better.     Plan:     A shared decision making plan was used. The patient's values and choices were respected. Prior medical records from our last visit on 12/28/2021 were reviewed today. The following represents what was discussed and decided upon by the provider and the patient.        -DIAGNOSTIC TESTS: Images were personally reviewed and interpreted.   --X-rays of bilateral hips dated 3/26/2021 is personally reviewed images interpreted and discussed with patient.  There is moderate to severe degenerative changes in both hips.    -INTERVENTIONS: No interventions at this time.    -MEDICATIONS: No changes to medications.  -  Discussed side effects of medications and proper use. Patient verbalized understanding.    -PHYSICAL THERAPY: Recommend that she continue with physical therapy exercises on a consistent basis.    -PATIENT EDUCATION: We discussed pain management in a multiple fashion including physical therapy, medication management, possible future injections.    -FOLLOW UP: Patient will follow up as needed  Advised to contact clinic if symptoms worsen or change.    Subjective:     Zaina Shaikh is a 56 year old female who presents today for follow-up regarding left groin and anterior  thigh pain.  Patient reports that she is received about 95% relief with this injection.  She notes that the sharp pain she was experiencing is now gone.  She able to cross her legs and tying her shoes now.  She does have pain if she sits for prolonged period of time pain is improved with walking around and using a heating pad.  She notes she is sleeping better she does not have a deep ache that she was having prior to the injection.  She does feel that she is having some pain in her knees.  She is been taking Tylenol 500 mg in the morning and evening and finds it this is somewhat helpful.  Her pain today is 1/10.  She denies any bowel or bladder changes, fevers, chills, unintentional weight loss.    -Treatment to Date: X-rays of bilateral hips dated 3/26/2021.  1 session of physical therapy.  Ultrasound-guided left hip joint injection on 1/11/2022.       Patient Active Problem List   Diagnosis     Osteoarthrosis Of The Knee     Vitamin D Deficiency     Hyperlipidemia     Hearing Loss     Benign Essential Hypertension     Heartburn     Eczema     Type 2 diabetes mellitus without complication (H)     Biceps tendinopathy, right     Hot flashes     DELL (obstructive sleep apnea)     Class 3 severe obesity due to excess calories with serious comorbidity and body mass index (BMI) of 40.0 to 44.9 in adult (H)     Family history of atherosclerosis     Primary osteoarthritis of both hips     Post-void dribbling     Chronic left-sided low back pain with left-sided sciatica       Current Outpatient Medications   Medication     acetaminophen (TYLENOL) 500 MG tablet     amLODIPine (NORVASC) 10 MG tablet     aspirin 81 MG EC tablet     atorvastatin (LIPITOR) 80 MG tablet     blood glucose (CONTOUR NEXT TEST) test strip     cholecalciferol, vitamin D3, (VITAMIN D3) 2,000 unit Tab     generic lancets     loratadine (CLARITIN) 10 mg tablet     losartan-hydrochlorothiazide (HYZAAR) 100-25 MG tablet     omeprazole (PRILOSEC) 20 MG  capsule     triamcinolone (ARISTOCORT HP) 0.5 % external cream     No current facility-administered medications for this visit.       Allergies   Allergen Reactions     Lisinopril Cough       Past Medical History:   Diagnosis Date     Nicotine abuse 3/12/2015     Sleep apnea         Review of Systems  ROS:  Specifically negative for bowel/bladder dysfunction, balance changes, headache, dizziness, foot drop, fevers, chills, appetite changes, nausea/vomiting, unexplained weight loss. Otherwise 13 systems reviewed are negative. Please see the patient's intake questionnaire from today for details.    Reviewed Social, Family, Past Medical and Past Surgical history with patient, no significant changes noted since prior visit.     Objective:     BP (!) 152/73   Pulse 75   SpO2 98%     PHYSICAL EXAMINATION:    --CONSTITUTIONAL: Well developed, well nourished, healthy appearing individual.  --PSYCHIATRIC: Appropriate mood and affect. No difficulty interacting due to temper, social withdrawal, or memory issues.  --SKIN: Lumbar region is dry and intact.   --RESPIRATORY: Normal rhythm and effort. No abnormal accessory muscle breathing patterns noted.   --MUSCULOSKELETAL:  Normal lumbar lordosis noted, no lateral shift.  --GROSS MOTOR: Easily arises from a seated position. Gait is non-antalgic  --LUMBAR SPINE:  Inspection reveals no evidence of deformity. Range of motion is mildly limited in lumbar flexion, extension, lateral rotation. No tenderness to palpation lumbar spine. Straight leg raising in the seated position is negative to radicular pain. Sciatic notch non-tender.   --SACROILIAC JOINT:  One Finger point test negative.  --LOWER EXTREMITY MOTOR TESTING:  Plantar flexion left 5/5, right 5/5   Dorsiflexion left 5/5, right 5/5   Great toe MTP extension left 5/5, right 5/5  Knee flexion left 5/5, right 5/5  Knee extension left 5/5, right 5/5   Hip flexion left 5/5, right 5/5  Hip abduction left 5/5, right 5/5  Hip  adduction left 5/5, right 5/5   --HIPS: Decreased internal rotation left hip, however minimal pain at this time.  --NEUROLOGIC:  Sensation to light touch is intact in the bilateral L4, L5, and S1 dermatomes.    RESULTS:   Imaging: Lumbar spine imaging was reviewed today. The images were shown to the patient and the findings were explained using a spine model.    PAIN US Large Joint Injection Unilateral    Result Date: 1/11/2022  Procedure: Ultrasound-guided left hip joint injection Procedure Date: Pre Procedure Diagnosis:  Left hip osteoarthritis Post Procedure Diagnosis:  Same Procedure Performed:  Left hip ultrasound-guided injection with Ultrasound Guidance Clinical Scenario:  As per office notes Vital Signs:  As per rooming/preprocedure documentation Side Injected: Left Procedure: After discussing the risks, benefits, and alternatives to the procedure, the patient expressed understanding and wished to proceed.  The patient was brought to the procedure suite and placed in the supine position.  A procedural pause was conducted to verify:  correct patient identity, procedure to be performed and as applicable, correct side and site, correct patient position, and availability of implants, special equipment or special requirements.  A simple surgical tray was used.  Prior to the procedure, the left hip joint was examined with a 3.75 MHz curvilinear transducer to visualize the left hip joint and determine the optimal needle path.  Following this, the groin was prepared with a ChloraPrep scrub, then re-examined using the same transducer, a sterile ultrasound transducer cover, and ultrasound transducer gel.  Local anesthesia was obtained with 2 cc of 1% lidocaine. Thereafter, using ultrasound guidance, a 3.5 inch, 22 gauge needle was advanced into the left anterior capsular recess. After visualization of the tip in the target area and negative aspiration for blood, a mixture of 40 mg of Depo-Medrol and 4 cc of 0.5%  ropivacaine was injected into the left hip joint. Following the injection, the needle was withdrawn.  The patient tolerated the procedure well and there were no apparent complications.  After an appropriate amount of observation, the patient was dismissed from the clinic in good condition under their own power. Preprocedure pain: 6/10 Postprocedure pain: 3/10

## 2022-01-31 NOTE — PATIENT INSTRUCTIONS
~Please call Nurse Navigation line [phone] with any questions or concerns about your treatment plan, if symptoms worsen and you would like to be seen urgently, or if you have problems controlling bladder and bowel function.

## 2022-01-31 NOTE — LETTER
1/31/2022         RE: Zaina Shaikh  [address]  [place] [address]        Dear Colleague,    Thank you for referring your patient, Zaina Shaikh, to the [facility]. Please see a copy of my visit note below.      Assessment:     Diagnoses and all orders for this visit:  Primary osteoarthritis of both hips  Myofascial pain     Zaina Shaikh is a 56 year old y.o. female with past medical history significant for hearing loss, chronic left-sided low back pain with left-sided sciatica, sleep apnea, vitamin D deficiency, heartburn, obesity, hyperlipidemia, type 2 diabetes mellitus without complication, hypertension, hot flashes, osteoarthritis of the knee, eczema, biceps tendinopathy of the right, primary osteoarthritis of both hips, nicotine abuse who presents today for follow-up regarding chronic low back and left lower extremity pain:    -Patient had 95% relief with her left hip joint injection.  She is feeling much better and getting better.     Plan:     A shared decision making plan was used. The patient's values and choices were respected. Prior medical records from our last visit on 12/28/2021 were reviewed today. The following represents what was discussed and decided upon by the provider and the patient.        -DIAGNOSTIC TESTS: Images were personally reviewed and interpreted.   --X-rays of bilateral hips dated 3/26/2021 is personally reviewed images interpreted and discussed with patient.  There is moderate to severe degenerative changes in both hips.    -INTERVENTIONS: No interventions at this time.    -MEDICATIONS: No changes to medications.  -  Discussed side effects of medications and proper use. Patient verbalized understanding.    -PHYSICAL THERAPY: Recommend that she continue with physical therapy exercises on a consistent basis.    -PATIENT EDUCATION: We discussed pain management in a multiple fashion including physical therapy, medication management,  possible future injections.    -FOLLOW UP: Patient will follow up as needed  Advised to contact clinic if symptoms worsen or change.    Subjective:     Zaina Shaikh is a 56 year old female who presents today for follow-up regarding left groin and anterior thigh pain.  Patient reports that she is received about 95% relief with this injection.  She notes that the sharp pain she was experiencing is now gone.  She able to cross her legs and tying her shoes now.  She does have pain if she sits for prolonged period of time pain is improved with walking around and using a heating pad.  She notes she is sleeping better she does not have a deep ache that she was having prior to the injection.  She does feel that she is having some pain in her knees.  She is been taking Tylenol 500 mg in the morning and evening and finds it this is somewhat helpful.  Her pain today is 1/10.  She denies any bowel or bladder changes, fevers, chills, unintentional weight loss.    -Treatment to Date: X-rays of bilateral hips dated 3/26/2021.  1 session of physical therapy.  Ultrasound-guided left hip joint injection on 1/11/2022.       Patient Active Problem List   Diagnosis     Osteoarthrosis Of The Knee     Vitamin D Deficiency     Hyperlipidemia     Hearing Loss     Benign Essential Hypertension     Heartburn     Eczema     Type 2 diabetes mellitus without complication (H)     Biceps tendinopathy, right     Hot flashes     DELL (obstructive sleep apnea)     Class 3 severe obesity due to excess calories with serious comorbidity and body mass index (BMI) of 40.0 to 44.9 in adult (H)     Family history of atherosclerosis     Primary osteoarthritis of both hips     Post-void dribbling     Chronic left-sided low back pain with left-sided sciatica       Current Outpatient Medications   Medication     acetaminophen (TYLENOL) 500 MG tablet     amLODIPine (NORVASC) 10 MG tablet     aspirin 81 MG EC tablet     atorvastatin (LIPITOR) 80 MG tablet      blood glucose (CONTOUR NEXT TEST) test strip     cholecalciferol, vitamin D3, (VITAMIN D3) 2,000 unit Tab     generic lancets     loratadine (CLARITIN) 10 mg tablet     losartan-hydrochlorothiazide (HYZAAR) 100-25 MG tablet     omeprazole (PRILOSEC) 20 MG capsule     triamcinolone (ARISTOCORT HP) 0.5 % external cream     No current facility-administered medications for this visit.       Allergies   Allergen Reactions     Lisinopril Cough       Past Medical History:   Diagnosis Date     Nicotine abuse 3/12/2015     Sleep apnea         Review of Systems  ROS:  Specifically negative for bowel/bladder dysfunction, balance changes, headache, dizziness, foot drop, fevers, chills, appetite changes, nausea/vomiting, unexplained weight loss. Otherwise 13 systems reviewed are negative. Please see the patient's intake questionnaire from today for details.    Reviewed Social, Family, Past Medical and Past Surgical history with patient, no significant changes noted since prior visit.     Objective:     BP (!) 152/73   Pulse 75   SpO2 98%     PHYSICAL EXAMINATION:    --CONSTITUTIONAL: Well developed, well nourished, healthy appearing individual.  --PSYCHIATRIC: Appropriate mood and affect. No difficulty interacting due to temper, social withdrawal, or memory issues.  --SKIN: Lumbar region is dry and intact.   --RESPIRATORY: Normal rhythm and effort. No abnormal accessory muscle breathing patterns noted.   --MUSCULOSKELETAL:  Normal lumbar lordosis noted, no lateral shift.  --GROSS MOTOR: Easily arises from a seated position. Gait is non-antalgic  --LUMBAR SPINE:  Inspection reveals no evidence of deformity. Range of motion is mildly limited in lumbar flexion, extension, lateral rotation. No tenderness to palpation lumbar spine. Straight leg raising in the seated position is negative to radicular pain. Sciatic notch non-tender.   --SACROILIAC JOINT:  One Finger point test negative.  --LOWER EXTREMITY MOTOR TESTING:  Plantar  flexion left 5/5, right 5/5   Dorsiflexion left 5/5, right 5/5   Great toe MTP extension left 5/5, right 5/5  Knee flexion left 5/5, right 5/5  Knee extension left 5/5, right 5/5   Hip flexion left 5/5, right 5/5  Hip abduction left 5/5, right 5/5  Hip adduction left 5/5, right 5/5   --HIPS: Decreased internal rotation left hip, however minimal pain at this time.  --NEUROLOGIC:  Sensation to light touch is intact in the bilateral L4, L5, and S1 dermatomes.    RESULTS:   Imaging: Lumbar spine imaging was reviewed today. The images were shown to the patient and the findings were explained using a spine model.    PAIN US Large Joint Injection Unilateral    Result Date: 1/11/2022  Procedure: Ultrasound-guided left hip joint injection Procedure Date: Pre Procedure Diagnosis:  Left hip osteoarthritis Post Procedure Diagnosis:  Same Procedure Performed:  Left hip ultrasound-guided injection with Ultrasound Guidance Clinical Scenario:  As per office notes Vital Signs:  As per rooming/preprocedure documentation Side Injected: Left Procedure: After discussing the risks, benefits, and alternatives to the procedure, the patient expressed understanding and wished to proceed.  The patient was brought to the procedure suite and placed in the supine position.  A procedural pause was conducted to verify:  correct patient identity, procedure to be performed and as applicable, correct side and site, correct patient position, and availability of implants, special equipment or special requirements.  A simple surgical tray was used.  Prior to the procedure, the left hip joint was examined with a 3.75 MHz curvilinear transducer to visualize the left hip joint and determine the optimal needle path.  Following this, the groin was prepared with a ChloraPrep scrub, then re-examined using the same transducer, a sterile ultrasound transducer cover, and ultrasound transducer gel.  Local anesthesia was obtained with 2 cc of 1% lidocaine.  Thereafter, using ultrasound guidance, a 3.5 inch, 22 gauge needle was advanced into the left anterior capsular recess. After visualization of the tip in the target area and negative aspiration for blood, a mixture of 40 mg of Depo-Medrol and 4 cc of 0.5% ropivacaine was injected into the left hip joint. Following the injection, the needle was withdrawn.  The patient tolerated the procedure well and there were no apparent complications.  After an appropriate amount of observation, the patient was dismissed from the clinic in good condition under their own power. Preprocedure pain: 6/10 Postprocedure pain: 3/10                              Again, thank you for allowing me to participate in the care of your patient.        Sincerely,        Frank Penaloza, DO

## 2022-03-21 DIAGNOSIS — K21.9 GERD (GASTROESOPHAGEAL REFLUX DISEASE): ICD-10-CM

## 2022-03-21 DIAGNOSIS — I10 ESSENTIAL HYPERTENSION, BENIGN: ICD-10-CM

## 2022-03-22 RX ORDER — LOSARTAN POTASSIUM AND HYDROCHLOROTHIAZIDE 25; 100 MG/1; MG/1
TABLET ORAL
Qty: 90 TABLET | Refills: 1 | Status: SHIPPED | OUTPATIENT
Start: 2022-03-22 | End: 2022-09-26

## 2022-03-22 NOTE — TELEPHONE ENCOUNTER
"Last Written Prescription Date:  4/2/21  Last Fill Quantity: 90,  # refills: 3   Last office visit provider:  9/10/21     Requested Prescriptions   Pending Prescriptions Disp Refills     omeprazole (PRILOSEC) 20 MG DR capsule [Pharmacy Med Name: OMEPRAZOLE 20MG CAPSULES] 90 capsule 3     Sig: TAKE 1 CAPSULE(20 MG) BY MOUTH DAILY       PPI Protocol Passed - 3/22/2022  2:54 PM        Passed - Not on Clopidogrel (unless Pantoprazole ordered)        Passed - No diagnosis of osteoporosis on record        Passed - Recent (12 mo) or future (30 days) visit within the authorizing provider's specialty     Patient has had an office visit with the authorizing provider or a provider within the authorizing providers department within the previous 12 mos or has a future within next 30 days. See \"Patient Info\" tab in inbasket, or \"Choose Columns\" in Meds & Orders section of the refill encounter.              Passed - Medication is active on med list        Passed - Patient is age 18 or older        Passed - No active pregnacy on record        Passed - No positive pregnancy test in past 12 months           losartan-hydrochlorothiazide (HYZAAR) 100-25 MG tablet [Pharmacy Med Name: LOSARTAN/HCTZ 100/25MG TABLETS] 90 tablet 1     Sig: TAKE 1 TABLET BY MOUTH DAILY       Angiotensin-II Receptors Failed - 3/21/2022  4:23 PM        Failed - Last blood pressure under 140/90 in past 12 months     BP Readings from Last 3 Encounters:   01/31/22 (!) 152/73   01/11/22 (!) 146/69   12/28/21 (!) 150/70                 Passed - Recent (12 mo) or future (30 days) visit within the authorizing provider's specialty     Patient has had an office visit with the authorizing provider or a provider within the authorizing providers department within the previous 12 mos or has a future within next 30 days. See \"Patient Info\" tab in [facility], or \"Choose Columns\" in Meds & Orders section of the refill encounter.              Passed - Medication is active on med " "list        Passed - Patient is age 18 or older        Passed - No active pregnancy on record        Passed - Normal serum creatinine on file in past 12 months     Recent Labs   Lab Test 09/10/21  0939   CR 0.76       Ok to refill medication if creatinine is low          Passed - Normal serum potassium on file in past 12 months     Recent Labs   Lab Test 09/10/21  0939   POTASSIUM 3.8                    Passed - No positive pregnancy test in past 12 months       Diuretics (Including Combos) Protocol Failed - 3/21/2022  4:23 PM        Failed - Blood pressure under 140/90 in past 12 months     BP Readings from Last 3 Encounters:   01/31/22 (!) 152/73   01/11/22 (!) 146/69   12/28/21 (!) 150/70                 Passed - Recent (12 mo) or future (30 days) visit within the authorizing provider's specialty     Patient has had an office visit with the authorizing provider or a provider within the authorizing providers department within the previous 12 mos or has a future within next 30 days. See \"Patient Info\" tab in inbasket, or \"Choose Columns\" in Meds & Orders section of the refill encounter.              Passed - Medication is active on med list        Passed - Patient is age 18 or older        Passed - No active pregancy on record        Passed - Normal serum creatinine on file in past 12 months     Recent Labs   Lab Test 09/10/21  0939   CR 0.76              Passed - Normal serum potassium on file in past 12 months     Recent Labs   Lab Test 09/10/21  0939   POTASSIUM 3.8                    Passed - Normal serum sodium on file in past 12 months     Recent Labs   Lab Test 09/10/21  0939                 Passed - No positive pregnancy test in past 12 months             Brien Youngblood RN 03/22/22 2:54 PM  "

## 2022-03-22 NOTE — TELEPHONE ENCOUNTER
"Routing refill request to provider for review/approval because:  BP not in range.    Last Written Prescription Date:  9/26/21  Last Fill Quantity: 90,  # refills: 1   Last office visit provider:  9/10/21     Requested Prescriptions   Pending Prescriptions Disp Refills     losartan-hydrochlorothiazide (HYZAAR) 100-25 MG tablet [Pharmacy Med Name: LOSARTAN/HCTZ 100/25MG TABLETS] 90 tablet 1     Sig: TAKE 1 TABLET BY MOUTH DAILY       Angiotensin-II Receptors Failed - 3/21/2022  4:23 PM        Failed - Last blood pressure under 140/90 in past 12 months     BP Readings from Last 3 Encounters:   01/31/22 (!) 152/73   01/11/22 (!) 146/69   12/28/21 (!) 150/70                 Passed - Recent (12 mo) or future (30 days) visit within the authorizing provider's specialty     Patient has had an office visit with the authorizing provider or a provider within the authorizing providers department within the previous 12 mos or has a future within next 30 days. See \"Patient Info\" tab in inbasket, or \"Choose Columns\" in Meds & Orders section of the refill encounter.              Passed - Medication is active on med list        Passed - Patient is age 18 or older        Passed - No active pregnancy on record        Passed - Normal serum creatinine on file in past 12 months     Recent Labs   Lab Test 09/10/21  0939   CR 0.76       Ok to refill medication if creatinine is low          Passed - Normal serum potassium on file in past 12 months     Recent Labs   Lab Test 09/10/21  0939   POTASSIUM 3.8                    Passed - No positive pregnancy test in past 12 months       Diuretics (Including Combos) Protocol Failed - 3/21/2022  4:23 PM        Failed - Blood pressure under 140/90 in past 12 months     BP Readings from Last 3 Encounters:   01/31/22 (!) 152/73   01/11/22 (!) 146/69   12/28/21 (!) 150/70                 Passed - Recent (12 mo) or future (30 days) visit within the authorizing provider's specialty     Patient has had an " "office visit with the authorizing provider or a provider within the authorizing providers department within the previous 12 mos or has a future within next 30 days. See \"Patient Info\" tab in inbasket, or \"Choose Columns\" in Meds & Orders section of the refill encounter.              Passed - Medication is active on med list        Passed - Patient is age 18 or older        Passed - No active pregancy on record        Passed - Normal serum creatinine on file in past 12 months     Recent Labs   Lab Test 09/10/21  0939   CR 0.76              Passed - Normal serum potassium on file in past 12 months     Recent Labs   Lab Test 09/10/21  0939   POTASSIUM 3.8                    Passed - Normal serum sodium on file in past 12 months     Recent Labs   Lab Test 09/10/21  0939                 Passed - No positive pregnancy test in past 12 months         Signed Prescriptions Disp Refills    omeprazole (PRILOSEC) 20 MG DR capsule 90 capsule 1     Sig: TAKE 1 CAPSULE(20 MG) BY MOUTH DAILY       PPI Protocol Passed - 3/22/2022  2:54 PM        Passed - Not on Clopidogrel (unless Pantoprazole ordered)        Passed - No diagnosis of osteoporosis on record        Passed - Recent (12 mo) or future (30 days) visit within the authorizing provider's specialty     Patient has had an office visit with the authorizing provider or a provider within the authorizing providers department within the previous 12 mos or has a future within next 30 days. See \"Patient Info\" tab in inbasket, or \"Choose Columns\" in Meds & Orders section of the refill encounter.              Passed - Medication is active on med list        Passed - Patient is age 18 or older        Passed - No active pregnacy on record        Passed - No positive pregnancy test in past 12 months             Brien Youngblood RN 03/22/22 2:55 PM  "

## 2022-03-27 ENCOUNTER — HEALTH MAINTENANCE LETTER (OUTPATIENT)
Age: 57
End: 2022-03-27

## 2022-03-28 DIAGNOSIS — E78.2 MIXED HYPERLIPIDEMIA: ICD-10-CM

## 2022-03-28 DIAGNOSIS — E11.9 TYPE 2 DIABETES MELLITUS WITHOUT COMPLICATION, WITHOUT LONG-TERM CURRENT USE OF INSULIN (H): ICD-10-CM

## 2022-03-31 RX ORDER — ATORVASTATIN CALCIUM 80 MG/1
TABLET, FILM COATED ORAL
Qty: 90 TABLET | Refills: 1 | Status: SHIPPED | OUTPATIENT
Start: 2022-03-31 | End: 2022-09-26

## 2022-03-31 NOTE — TELEPHONE ENCOUNTER
"Routing refill request to provider for review/approval because:  Labs not current:  LDL    Last Written Prescription Date:  10/5/2021  Last Fill Quantity: 90,  # refills: 1   Last office visit provider:  1/31/2022     Requested Prescriptions   Pending Prescriptions Disp Refills     atorvastatin (LIPITOR) 80 MG tablet [Pharmacy Med Name: ATORVASTATIN 80MG TABLETS] 90 tablet 1     Sig: TAKE 1 TABLET(80 MG) BY MOUTH DAILY       Statins Protocol Failed - 3/28/2022  3:27 PM        Failed - LDL on file in past 12 months     Recent Labs   Lab Test 03/26/21  0926                Passed - No abnormal creatine kinase in past 12 months     No lab results found.             Passed - Recent (12 mo) or future (30 days) visit within the authorizing provider's specialty     Patient has had an office visit with the authorizing provider or a provider within the authorizing providers department within the previous 12 mos or has a future within next 30 days. See \"Patient Info\" tab in inbasket, or \"Choose Columns\" in Meds & Orders section of the refill encounter.              Passed - Medication is active on med list        Passed - Patient is age 18 or older        Passed - No active pregnancy on record        Passed - No positive pregnancy test in past 12 months             Armida Godwin RN 03/30/22 7:28 PM  "

## 2022-04-12 ENCOUNTER — DOCUMENTATION ONLY (OUTPATIENT)
Dept: LAB | Facility: CLINIC | Age: 57
End: 2022-04-12
Payer: COMMERCIAL

## 2022-04-12 NOTE — PROGRESS NOTES
Zaina Copelandcaroline has an upcoming lab appointment:    Future Appointments   Date Time Provider Department Center   4/13/2022 11:00 AM Madisyn Stacy MD [facility]   4/27/2022  8:00 AM [facility]     Patient is scheduled for the following lab(s): A1C level    There is no order available. Please review and place either future orders or HMPO (Review of Health Maintenance Protocol Orders), as appropriate.    Health Maintenance Due   Topic     ANNUAL REVIEW OF HM ORDERS      A1C      LIPID      Erwin Martinez

## 2022-04-13 ENCOUNTER — TELEPHONE (OUTPATIENT)
Dept: FAMILY MEDICINE | Facility: CLINIC | Age: 57
End: 2022-04-13
Payer: COMMERCIAL

## 2022-04-13 DIAGNOSIS — I10 ESSENTIAL HYPERTENSION, BENIGN: ICD-10-CM

## 2022-04-13 DIAGNOSIS — M17.10 UNILATERAL PRIMARY OSTEOARTHRITIS, UNSPECIFIED KNEE: Primary | ICD-10-CM

## 2022-04-13 DIAGNOSIS — E78.2 MIXED HYPERLIPIDEMIA: ICD-10-CM

## 2022-04-13 DIAGNOSIS — E11.9 TYPE 2 DIABETES MELLITUS WITHOUT COMPLICATION, WITHOUT LONG-TERM CURRENT USE OF INSULIN (H): ICD-10-CM

## 2022-04-13 PROBLEM — M67.921 BICEPS TENDINOPATHY, RIGHT: Status: RESOLVED | Noted: 2017-09-13 | Resolved: 2022-04-13

## 2022-04-13 NOTE — TELEPHONE ENCOUNTER
Called pt with referral and gave phone number to call if pt not called.  Pt has labs set up for 4/27 at [facility],  She can do the BP check at the same time.Thanks

## 2022-04-13 NOTE — CONFIDENTIAL NOTE
Patient was going to come in and see Dr. Stacy for knee pain today, Dr. Stacy is not in the clinic today and she was not wanting to wait till Dr. Stacy next opening which would be 4/20. She is wondering if she can just get a referral to an Orthopedic Doctor for her knees

## 2022-04-27 ENCOUNTER — LAB (OUTPATIENT)
Dept: LAB | Facility: CLINIC | Age: 57
End: 2022-04-27
Payer: COMMERCIAL

## 2022-04-27 DIAGNOSIS — E78.2 MIXED HYPERLIPIDEMIA: ICD-10-CM

## 2022-04-27 DIAGNOSIS — E11.9 TYPE 2 DIABETES MELLITUS WITHOUT COMPLICATION, WITHOUT LONG-TERM CURRENT USE OF INSULIN (H): ICD-10-CM

## 2022-04-27 DIAGNOSIS — I10 ESSENTIAL HYPERTENSION, BENIGN: ICD-10-CM

## 2022-04-27 LAB
ANION GAP SERPL CALCULATED.3IONS-SCNC: 14 MMOL/L (ref 5–18)
BUN SERPL-MCNC: 15 MG/DL (ref 8–22)
CALCIUM SERPL-MCNC: 9.7 MG/DL (ref 8.5–10.5)
CHLORIDE BLD-SCNC: 103 MMOL/L (ref 98–107)
CHOLEST SERPL-MCNC: 184 MG/DL
CO2 SERPL-SCNC: 26 MMOL/L (ref 22–31)
CREAT SERPL-MCNC: 0.79 MG/DL (ref 0.6–1.1)
FASTING STATUS PATIENT QL REPORTED: YES
GFR SERPL CREATININE-BSD FRML MDRD: 86 ML/MIN/1.73M2
GLUCOSE BLD-MCNC: 133 MG/DL (ref 70–125)
HBA1C MFR BLD: 6.8 % (ref 0–5.6)
HDLC SERPL-MCNC: 45 MG/DL
LDLC SERPL CALC-MCNC: 115 MG/DL
POTASSIUM BLD-SCNC: 3.7 MMOL/L (ref 3.5–5)
SODIUM SERPL-SCNC: 143 MMOL/L (ref 136–145)
TRIGL SERPL-MCNC: 119 MG/DL

## 2022-04-27 PROCEDURE — 36415 COLL VENOUS BLD VENIPUNCTURE: CPT

## 2022-04-27 PROCEDURE — 80048 BASIC METABOLIC PNL TOTAL CA: CPT

## 2022-04-27 PROCEDURE — 83036 HEMOGLOBIN GLYCOSYLATED A1C: CPT

## 2022-04-27 PROCEDURE — 80061 LIPID PANEL: CPT

## 2022-05-03 ENCOUNTER — ALLIED HEALTH/NURSE VISIT (OUTPATIENT)
Dept: FAMILY MEDICINE | Facility: CLINIC | Age: 57
End: 2022-05-03
Payer: COMMERCIAL

## 2022-05-03 VITALS — DIASTOLIC BLOOD PRESSURE: 82 MMHG | SYSTOLIC BLOOD PRESSURE: 139 MMHG | HEART RATE: 73 BPM

## 2022-05-03 DIAGNOSIS — Z01.30 BP CHECK: Primary | ICD-10-CM

## 2022-05-03 PROCEDURE — 99207 PR NO CHARGE NURSE ONLY: CPT

## 2022-05-03 NOTE — PROGRESS NOTES
I met with Zaina Shaikh at the request of Dr. Stacy to recheck her blood pressure.  Blood pressure medications on the med list were reviewed with patient.    Patient has taken all medications as per usual regimen: Yes  Patient reports tolerating them without any issues or concerns: No    Vitals:    05/03/22 0801 05/03/22 0807   BP: (!) 144/82 139/82   BP Location: Left arm Left arm   Patient Position: Sitting Sitting   Cuff Size: Adult Large Adult Large   Pulse: 71 73       After 5 minutes, the patient's blood pressure was <140/90, the previous encounter was reviewed, recorded blood pressure below 140/90. Patient was discharged and the note will be sent to the provider for final review.

## 2022-05-04 ENCOUNTER — MYC MEDICAL ADVICE (OUTPATIENT)
Dept: FAMILY MEDICINE | Facility: CLINIC | Age: 57
End: 2022-05-04
Payer: COMMERCIAL

## 2022-06-23 ENCOUNTER — TRANSFERRED RECORDS (OUTPATIENT)
Dept: HEALTH INFORMATION MANAGEMENT | Facility: CLINIC | Age: 57
End: 2022-06-23

## 2022-08-30 ENCOUNTER — MYC MEDICAL ADVICE (OUTPATIENT)
Dept: FAMILY MEDICINE | Facility: CLINIC | Age: 57
End: 2022-08-30

## 2022-08-30 DIAGNOSIS — E11.9 TYPE 2 DIABETES MELLITUS WITHOUT COMPLICATION, WITHOUT LONG-TERM CURRENT USE OF INSULIN (H): Primary | ICD-10-CM

## 2022-09-06 ENCOUNTER — TRANSFERRED RECORDS (OUTPATIENT)
Dept: HEALTH INFORMATION MANAGEMENT | Facility: CLINIC | Age: 57
End: 2022-09-06

## 2022-09-06 PROBLEM — Z01.30 BP CHECK: Status: RESOLVED | Noted: 2022-05-03 | Resolved: 2022-09-06

## 2022-09-06 LAB — RETINOPATHY: NEGATIVE

## 2022-09-12 ENCOUNTER — ANCILLARY PROCEDURE (OUTPATIENT)
Dept: MAMMOGRAPHY | Facility: CLINIC | Age: 57
End: 2022-09-12
Attending: FAMILY MEDICINE
Payer: COMMERCIAL

## 2022-09-12 DIAGNOSIS — Z12.31 SCREENING MAMMOGRAM, ENCOUNTER FOR: ICD-10-CM

## 2022-09-12 PROCEDURE — 77067 SCR MAMMO BI INCL CAD: CPT

## 2022-09-19 ASSESSMENT — ENCOUNTER SYMPTOMS
CONSTIPATION: 0
PARESTHESIAS: 0
NAUSEA: 0
CHILLS: 0
DIARRHEA: 0
HEARTBURN: 0
ABDOMINAL PAIN: 0
ARTHRALGIAS: 1
FREQUENCY: 1
HEADACHES: 0
SHORTNESS OF BREATH: 1
BREAST MASS: 0
COUGH: 0
JOINT SWELLING: 0
NERVOUS/ANXIOUS: 0
PALPITATIONS: 0
DIZZINESS: 0
MYALGIAS: 1
FEVER: 0
DYSURIA: 0
WEAKNESS: 0
EYE PAIN: 0
HEMATOCHEZIA: 0
HEMATURIA: 0
SORE THROAT: 0

## 2022-09-23 ENCOUNTER — LAB (OUTPATIENT)
Dept: LAB | Facility: CLINIC | Age: 57
End: 2022-09-23
Payer: COMMERCIAL

## 2022-09-23 DIAGNOSIS — E11.9 TYPE 2 DIABETES MELLITUS WITHOUT COMPLICATION, WITHOUT LONG-TERM CURRENT USE OF INSULIN (H): ICD-10-CM

## 2022-09-23 LAB
ALBUMIN SERPL BCG-MCNC: 4.2 G/DL (ref 3.5–5.2)
ALP SERPL-CCNC: 72 U/L (ref 35–104)
ALT SERPL W P-5'-P-CCNC: 24 U/L (ref 10–35)
ANION GAP SERPL CALCULATED.3IONS-SCNC: 13 MMOL/L (ref 7–15)
AST SERPL W P-5'-P-CCNC: 22 U/L (ref 10–35)
BILIRUB SERPL-MCNC: 0.5 MG/DL
BUN SERPL-MCNC: 14.6 MG/DL (ref 6–20)
CALCIUM SERPL-MCNC: 9.7 MG/DL (ref 8.6–10)
CHLORIDE SERPL-SCNC: 103 MMOL/L (ref 98–107)
CREAT SERPL-MCNC: 0.64 MG/DL (ref 0.51–0.95)
CREAT UR-MCNC: 65.5 MG/DL
DEPRECATED HCO3 PLAS-SCNC: 26 MMOL/L (ref 22–29)
GFR SERPL CREATININE-BSD FRML MDRD: >90 ML/MIN/1.73M2
GLUCOSE SERPL-MCNC: 137 MG/DL (ref 70–99)
HBA1C MFR BLD: 6.7 % (ref 0–5.6)
MICROALBUMIN UR-MCNC: <12 MG/L
MICROALBUMIN/CREAT UR: NORMAL MG/G{CREAT}
POTASSIUM SERPL-SCNC: 3.9 MMOL/L (ref 3.4–5.3)
PROT SERPL-MCNC: 7.4 G/DL (ref 6.4–8.3)
SODIUM SERPL-SCNC: 142 MMOL/L (ref 136–145)

## 2022-09-23 PROCEDURE — 82043 UR ALBUMIN QUANTITATIVE: CPT

## 2022-09-23 PROCEDURE — 83036 HEMOGLOBIN GLYCOSYLATED A1C: CPT

## 2022-09-23 PROCEDURE — 80053 COMPREHEN METABOLIC PANEL: CPT

## 2022-09-23 PROCEDURE — 36415 COLL VENOUS BLD VENIPUNCTURE: CPT

## 2022-09-25 ENCOUNTER — HEALTH MAINTENANCE LETTER (OUTPATIENT)
Age: 57
End: 2022-09-25

## 2022-09-26 ENCOUNTER — OFFICE VISIT (OUTPATIENT)
Dept: FAMILY MEDICINE | Facility: CLINIC | Age: 57
End: 2022-09-26
Payer: COMMERCIAL

## 2022-09-26 VITALS
BODY MASS INDEX: 41.48 KG/M2 | SYSTOLIC BLOOD PRESSURE: 124 MMHG | OXYGEN SATURATION: 100 % | DIASTOLIC BLOOD PRESSURE: 64 MMHG | HEART RATE: 76 BPM | WEIGHT: 249 LBS | HEIGHT: 65 IN

## 2022-09-26 DIAGNOSIS — Z71.89 ADVANCED DIRECTIVES, COUNSELING/DISCUSSION: ICD-10-CM

## 2022-09-26 DIAGNOSIS — E11.9 TYPE 2 DIABETES MELLITUS WITHOUT COMPLICATION, WITHOUT LONG-TERM CURRENT USE OF INSULIN (H): ICD-10-CM

## 2022-09-26 DIAGNOSIS — E66.813 CLASS 3 SEVERE OBESITY DUE TO EXCESS CALORIES WITH SERIOUS COMORBIDITY AND BODY MASS INDEX (BMI) OF 40.0 TO 44.9 IN ADULT (H): ICD-10-CM

## 2022-09-26 DIAGNOSIS — E78.2 MIXED HYPERLIPIDEMIA: ICD-10-CM

## 2022-09-26 DIAGNOSIS — Z00.00 ROUTINE GENERAL MEDICAL EXAMINATION AT A HEALTH CARE FACILITY: ICD-10-CM

## 2022-09-26 DIAGNOSIS — G47.33 OSA (OBSTRUCTIVE SLEEP APNEA): ICD-10-CM

## 2022-09-26 DIAGNOSIS — E66.01 CLASS 3 SEVERE OBESITY DUE TO EXCESS CALORIES WITH SERIOUS COMORBIDITY AND BODY MASS INDEX (BMI) OF 40.0 TO 44.9 IN ADULT (H): ICD-10-CM

## 2022-09-26 DIAGNOSIS — I10 ESSENTIAL HYPERTENSION, BENIGN: ICD-10-CM

## 2022-09-26 DIAGNOSIS — E11.9 TYPE 2 DIABETES MELLITUS WITHOUT COMPLICATION, WITHOUT LONG-TERM CURRENT USE OF INSULIN (H): Primary | ICD-10-CM

## 2022-09-26 DIAGNOSIS — Z87.891 PERSONAL HISTORY OF TOBACCO USE: ICD-10-CM

## 2022-09-26 DIAGNOSIS — M17.10 UNILATERAL PRIMARY OSTEOARTHRITIS, UNSPECIFIED KNEE: ICD-10-CM

## 2022-09-26 PROCEDURE — 99396 PREV VISIT EST AGE 40-64: CPT | Performed by: FAMILY MEDICINE

## 2022-09-26 PROCEDURE — 99213 OFFICE O/P EST LOW 20 MIN: CPT | Mod: 25 | Performed by: FAMILY MEDICINE

## 2022-09-26 RX ORDER — ATORVASTATIN CALCIUM 80 MG/1
TABLET, FILM COATED ORAL
Qty: 90 TABLET | Refills: 4 | Status: SHIPPED | OUTPATIENT
Start: 2022-09-26 | End: 2023-12-27

## 2022-09-26 RX ORDER — AMLODIPINE BESYLATE 10 MG/1
10 TABLET ORAL DAILY
Qty: 90 TABLET | Refills: 4 | Status: SHIPPED | OUTPATIENT
Start: 2022-09-26 | End: 2023-12-11

## 2022-09-26 RX ORDER — LOSARTAN POTASSIUM AND HYDROCHLOROTHIAZIDE 25; 100 MG/1; MG/1
1 TABLET ORAL DAILY
Qty: 90 TABLET | Refills: 4 | Status: SHIPPED | OUTPATIENT
Start: 2022-09-26 | End: 2023-12-19

## 2022-09-26 ASSESSMENT — ENCOUNTER SYMPTOMS
DIARRHEA: 0
MYALGIAS: 1
BREAST MASS: 0
EYE PAIN: 0
SHORTNESS OF BREATH: 1
FEVER: 0
HEARTBURN: 0
NAUSEA: 0
SORE THROAT: 0
COUGH: 0
DIZZINESS: 0
CONSTIPATION: 0
HEADACHES: 0
HEMATOCHEZIA: 0
WEAKNESS: 0
CHILLS: 0
NERVOUS/ANXIOUS: 0
PARESTHESIAS: 0
FREQUENCY: 1
HEMATURIA: 0
ABDOMINAL PAIN: 0
ARTHRALGIAS: 1
PALPITATIONS: 0
JOINT SWELLING: 0
DYSURIA: 0

## 2022-09-26 NOTE — PATIENT INSTRUCTIONS
Preventive Health Recommendations  Female Ages 50 - 64    Yearly exam: See your health care provider every year in order to  o Review health changes.   o Discuss preventive care.    o Review your medicines if your doctor has prescribed any.      Get a Pap test every three years (unless you have an abnormal result and your provider advises testing more often).    If you get Pap tests with HPV test, you only need to test every 5 years, unless you have an abnormal result.     You do not need a Pap test if your uterus was removed (hysterectomy) and you have not had cancer.    You should be tested each year for STDs (sexually transmitted diseases) if you're at risk.     Have a mammogram every 1 to 2 years.    Have a colonoscopy at age 50, or have a yearly FIT test (stool test). These exams screen for colon cancer.      Have a cholesterol test every 5 years, or more often if advised.    Have a diabetes test (fasting glucose) every three years. If you are at risk for diabetes, you should have this test more often.     If you are at risk for osteoporosis (brittle bone disease), think about having a bone density scan (DEXA).    Shots: Get a flu shot each year. Get a tetanus shot every 10 years.    Nutrition:     Eat at least 5 servings of fruits and vegetables each day.    Eat whole-grain bread, whole-wheat pasta and brown rice instead of white grains and rice.    Get adequate Calcium and Vitamin D.     Lifestyle    Exercise at least 150 minutes a week (30 minutes a day, 5 days a week). This will help you control your weight and prevent disease.    Limit alcohol to one drink per day.    No smoking.     Wear sunscreen to prevent skin cancer.     See your dentist every six months for an exam and cleaning.    See your eye doctor every 1 to 2 years.    Lung Cancer Screening   Frequently Asked Questions  If you are at high-risk for lung cancer, getting screened with low-dose computed tomography (LDCT) every year can help save  your life. This handout offers answers to some of the most common questions about lung cancer screening. If you have other questions, please call [phone] ([phone]).     What is it?  Lung cancer screening uses special X-ray technology to create an image of your lung tissue. The exam is quick and easy and takes less than 10 seconds. We don t give you any medicine or use any needles. You can eat before and after the exam. You don t need to change your clothes as long as the clothing on your chest doesn t contain metal. But, you do need to be able to hold your breath for at least 6 seconds during the exam.    What is the goal of lung cancer screening?  The goal of lung cancer screening is to save lives. Many times, lung cancer is not found until a person starts having physical symptoms. Lung cancer screening can help detect lung cancer in the earliest stages when it may be easier to treat.    Who should be screened for lung cancer?  We suggest lung cancer screening for anyone who is at high-risk for lung cancer. You are in the high-risk group if you:      are between the ages of 55 and 79, and    have smoked at least 1 pack of cigarettes a day for 20 or more years, and    still smoke or have quit within the past 15 years.    However, if you have a new cough or shortness of breath, you should talk to your doctor before being screened.    Why does it matter if I have symptoms?  Certain symptoms can be a sign that you have a condition in your lungs that should be checked and treated by your doctor. These symptoms include fever, chest pain, a new or changing cough, shortness of breath that you have never felt before, coughing up blood or unexplained weight loss. Having any of these symptoms can greatly affect the results of lung cancer screening.       Should all smokers get an LDCT lung cancer screening exam?  It depends. Lung cancer screening is for a very specific group of men and women who have a  history of heavy smoking over a long period of time (see  Who should be screened for lung cancer  above).  I am in the high-risk group, but have been diagnosed with cancer in the past. Is LDCT lung cancer screening right for me?  In some cases, you should not have LDCT lung screening, such as when your doctor is already following your cancer with CT scan studies. Your doctor will help you decide if LDCT lung screening is right for you.  Do I need to have a screening exam every year?  Yes. If you are in the high-risk group described earlier, you should get an LDCT lung cancer screening exam every year until you are 79, or are no longer willing or able to undergo screening and possible procedures to diagnose and treat lung cancer.  How effective is LDCT at preventing death from lung cancer?  Studies have shown that LDCT lung cancer screening can lower the risk of death from lung cancer by 20 percent in people who are at high-risk.  What are the risks?  There are some risks and limitations of LDCT lung cancer screening. We want to make sure you understand the risks and benefits, so please let us know if you have any questions. Your doctor may want to talk with you more about these risks.    Radiation exposure: As with any exam that uses radiation, there is a very small increased risk of cancer. The amount of radiation in LDCT is small--about the same amount a person would get from a mammogram. Your doctor orders the exam when he or she feels the potential benefits outweigh the risks.    False negatives: No test is perfect, including LDCT. It is possible that you may have a medical condition, including lung cancer, that is not found during your exam. This is called a false negative result.    False positives and more testing: LDCT very often finds something in the lung that could be cancer, but in fact is not. This is called a false positive result. False positive tests often cause anxiety. To make sure these findings  are not cancer, you may need to have more tests. These tests will be done only if you give us permission. Sometimes patients need a treatment that can have side effects, such as a biopsy. For more information on false positives, see  What can I expect from the results?     Findings not related to lung cancer: Your LDCT exam also takes pictures of areas of your body next to your lungs. In a very small number of cases, the CT scan will show an abnormal finding in one of these areas, such as your kidneys, adrenal glands, liver or thyroid. This finding may not be serious, but you may need more tests. Your doctor can help you decide what other tests you may need, if any.  What can I expect from the results?  About 1 out of 4 LDCT exams will find something that may need more tests. Most of the time, these findings are lung nodules. Lung nodules are very small collections of tissue in the lung. These nodules are very common, and the vast majority--more than 97 percent--are not cancer (benign). Most are normal lymph nodes or small areas of scarring from past infections.  But, if a small lung nodule is found to be cancer, the cancer can be cured more than 90 percent of the time. To know if the nodule is cancer, we may need to get more images before your next yearly screening exam. If the nodule has suspicious features (for example, it is large, has an odd shape or grows over time), we will refer you to a specialist for further testing.  Will my doctor also get the results?  Yes. Your doctor will get a copy of your results.  Is it okay to keep smoking now that there s a cancer screening exam?  No. Tobacco is one of the strongest cancer-causing agents. It causes not only lung cancer, but other cancers and cardiovascular (heart) diseases as well. The damage caused by smoking builds over time. This means that the longer you smoke, the higher your risk of disease. While it is never too late to quit, the sooner you quit, the  better.  Where can I find help to quit smoking?  The best way to prevent lung cancer is to stop smoking. If you have already quit smoking, congratulations and keep it up! For help on quitting smoking, please call [employer] at 8-678-QUITNOW (1-103.638.3173) or the [employer] at 1-294.200.9977 to find local resources near you.  One-on-one health coaching:  If you d prefer to work individually with a health care provider on tobacco cessation, we offer:      Medication Therapy Management:  Our specially trained pharmacists work closely with you and your doctor to help you quit smoking.  Call [phone] or [phone] (toll free).

## 2022-09-26 NOTE — PROGRESS NOTES
Lung Cancer Screening Shared Decision Making Visit     Zaina Shaikh, a 56 year old female, is eligible for lung cancer screening    History   Smoking Status     Former Smoker     Packs/day: 0.50     Years: 32.00     Types: Cigarettes, Cigarettes     Start date: 1/1/1983     Quit date: 12/1/2015   Smokeless Tobacco     Former User     Quit date: 12/26/2015   {TIP  Follow this link to update the tobacco history if needed :821748}    I have discussed with patient the risks and benefits of screening for lung cancer with low-dose CT.     The risks include:    radiation exposure: one low dose chest CT has as much ionizing radiation as about 15 chest x-rays, or 6 months of background radiation living in [place]      false positives: most findings/nodules are NOT cancer, but some might still require additional diagnostic evaluation, including biopsy    over-diagnosis: some slow growing cancers that might never have been clinically significant will be detected and treated unnecessarily     The benefit of early detection of lung cancer is contingent upon adherence to annual screening or more frequent follow up if indicated.     Furthermore, to benefit from screening, Zaina must be willing and able to undergo diagnostic procedures, if indicated. Although no specific guide is available for determining severity of comorbidities, it is reasonable to withhold screening in patients who have greater mortality risk from other diseases.     We did discuss that the best way to prevent lung cancer is to not smoke.    Some patients may value a numeric estimation of lung cancer risk when evaluating if lung cancer screening is right for them, here is one calculator:    ShouldIScreen

## 2022-09-26 NOTE — PROGRESS NOTES
SUBJECTIVE:   CC: Zaina is an 56 year old who presents for preventive health visit.     [facility] Maintenance Exam  [facility]  Phone : none    Assessment/Plan     1. Annual Wellness Visit as outlined below.   Health maintenance discussed as appropriate for age and risk factors including:  Nutrition, exercise, alcohol use, tobacco use, safe sexual practices, dental health.  Cancer screening assessed and ordered as indicated. Routine labs as outlined below based on age and risk factors reviewed today. Immunizations reviewed and updated.       Type 2 diabetes mellitus without complication, without long-term current use of insulin (H)   Controlled.  Addressed smoking status and aspirin therapy.  Recommended annual eye exam and dental cares. Reviewed foot cares and foot exam.  Blood pressure and lipid management reviewed today.  Vaccines reviewed and updated.  Plan for glucose management includes ongoing focus on healthy diabetic diet and increased activity, no need for meds.  Labs ordered as below including:     Hemoglobin A1C   Date Value Ref Range Status   09/23/2022 6.7 (H) 0.0 - 5.6 % Final     Comment:     Normal <5.7%   Prediabetes 5.7-6.4%    Diabetes 6.5% or higher     Note: Adopted from ADA consensus guidelines.   04/27/2022 6.8 (H) 0.0 - 5.6 % Final     Comment:     Normal <5.7%   Prediabetes 5.7-6.4%    Diabetes 6.5% or higher     Note: Adopted from ADA consensus guidelines.   09/10/2021 6.8 (H) 0.0 - 5.6 % Final     Comment:     Normal <5.7%   Prediabetes 5.7-6.4%    Diabetes 6.5% or higher     Note: Adopted from ADA consensus guidelines.    ,   Lab Results   Component Value Date     04/27/2022   ,   Creatinine   Date Value Ref Range Status   09/23/2022 0.64 0.51 - 0.95 mg/dL Final          Routine general medical examination at a health care facility  - REVIEW OF HEALTH MAINTENANCE PROTOCOL ORDERS    Class 3 severe obesity due to excess calories with serious  comorbidity and body mass index (BMI) of 40.0 to 44.9 in adult (H)  Reviewed LSM to help control her diabetes and htn and loose weight  Activity limited by OA and OA worse with obesity     Personal history of tobacco use  Former smoker- quit 2015 1/2 ppd for 32 years  No need for cancer screening     Benign Essential Hypertension  BP Readings from Last 3 Encounters:   09/26/22 124/64   05/03/22 139/82   01/31/22 (!) 152/73        controlled today.  Plan for bloodpressure management includes ongoing focus on healthy DASH type diet and increased activity, encouraged to avoid tobacco products and limit alcohol use, stress reduction, continue losartan/hctz 100/25mg daily.  Labwork and meds ordered and reviewed as below  Potassium   Date Value Ref Range Status   09/23/2022 3.9 3.4 - 5.3 mmol/L Final   04/27/2022 3.7 3.5 - 5.0 mmol/L Final      Creatinine   Date Value Ref Range Status   09/23/2022 0.64 0.51 - 0.95 mg/dL Final        - losartan-hydrochlorothiazide (HYZAAR) 100-25 MG tablet  Dispense: 90 tablet; Refill: 4    DELL (obstructive sleep apnea)  Uses cpap- doing well with current settings per pt.  Hasn't had follow-up with sleep clinic in many years- declines referral today as she is doing well.     Osteoarthrosis Of The Knee  Working with ortho- considering replacement in future as next step.  Has done multiple rounds of steroid and synvisc, etc.      Advanced directives, counseling/discussion  Would trust her sister Dalia Black [phone] to be her POAHC as needed.  No formal document and Otilia Beck sister to be secondary as needed [phone].  Pt given blank honoring choices to complete.           Return in about 6 months (around 3/26/2023) for Office Visit.    HPI:     Chief Complaint   Patient presents with     Physical       Zaina Howet is a 56 year old female and is here for a comprehensive health maintenance exam.     Other concerns today:   Left hip is doing better after injection last  "winter with [facility] clinic  Both knees have been bothering her- working with [facility].  Got bilateral cortisone injections and felt great.   Right knee started to hurt this summer and then did a course of \"gel\" and not much better.  Worse when she is on her feet more.  Tylenol bid as needed.      Lower back is starting to ache   Did start home PT program again for back and core    Only able to walk about 10min due to achiness.      Mild swelling in feet and ankles in summer months  Better when she sits and puts her feet up.     Eye exam - [facility]  Normal- maybe start of macular degeneration     Declines covid and flu shots today     Lab tests reviewed-1: [id number]    Review of Systems   Complete ROS including General, HEENT, CV, Pulmonary, GI, , Genital, Neuro, Psych, MSK, Heme, Endocrine all within normal except as noted in the HPI.      Prevention of Osteoporosis:vitamin D, calcium in diet   Last Dexa:na    Cancer Screening:  Hemoccults/Colonoscopy: normal 7/28/14- repeat 2024  Mammogram:  Normal 2022  Pap Smear:  Normal 2021 without HPV   Lung Cancer: NA 16 year pyh quit 2015      Wt Readings from Last 3 Encounters:   09/26/22 112.9 kg (249 lb)   01/11/22 112 kg (247 lb)   12/28/21 112.5 kg (248 lb)         Complete physical exam including:  General, HEENT, Neck, breast, back, CV, Pulmonary, Abdominal, extremities, skin, neuro, psych, lymph, genital exams all within normal.    Abnormalities include:  All normal   GYN deferred as pt is not due for screening and asymptomatic  Breast deferred due to lack of symptoms an normal mammogram     Patient has been advised of split billing requirements and indicates understanding: Yes  Healthy Habits:     Getting at least 3 servings of Calcium per day:  Yes    Bi-annual eye exam:  Yes    Dental care twice a year:  Yes    Sleep apnea or symptoms of sleep apnea:  Sleep apnea    Diet:  Regular (no restrictions)    Frequency of exercise:  2-3 " days/week    Duration of exercise:  Less than 15 minutes    Taking medications regularly:  Yes    Medication side effects:  None    PHQ-2 Total Score: 0    Additional concerns today:  No    Today's PHQ-2 Score:   PHQ-2 (  [employer]) 2022   Q1: Little interest or pleasure in doing things 0   Q2: Feeling down, depressed or hopeless 0   PHQ-2 Score 0   PHQ-2 Total Score (12-17 Years)- Positive if 3 or more points; Administer PHQ-A if positive -   Q1: Little interest or pleasure in doing things Not at all   Q2: Feeling down, depressed or hopeless Not at all   PHQ-2 Score 0       Abuse: Current or Past (Physical, Sexual or Emotional) - No  Do you feel safe in your environment? Yes    Have you ever done Advance Care Planning? (For example, a Health Directive, POLST, or a discussion with a medical provider or your loved ones about your wishes): No, advance care planning information given to patient to review.  Advanced care planning was discussed at today's visit.    Social History     Tobacco Use     Smoking status: Former Smoker     Packs/day: 0.50     Years: 32.00     Pack years: 16.00     Types: Cigarettes, Cigarettes     Start date: 1983     Quit date: 2015     Years since quittin.8     Smokeless tobacco: Former User     Quit date: 2015   Substance Use Topics     Alcohol use: Yes     Comment: Alcoholic Drinks/day: occasionally       Alcohol Use 2022   Prescreen: >3 drinks/day or >7 drinks/week? No     Reviewed orders with patient.  Reviewed health maintenance and updated orders accordingly - Yes    Breast Cancer Screening:    Breast CA Risk Assessment (FHS-7) 2022   Do you have a family history of breast, colon, or ovarian cancer? No / Unknown       Mammogram Screening: Recommended mammography every 1-2 years with patient discussion and risk factor consideration  Pertinent mammograms are reviewed under the imaging tab.    History of abnormal Pap smear: NO - age 30-65 PAP every 5  years with negative HPV co-testing recommended  PAP / HPV Latest Ref Rng & Units 9/10/2021 2016   PAP   Negative for Intraepithelial Lesion or Malignancy (NILM) Negative for squamous intraepithelial lesion or malignancy  Electronically signed by Johnna Gonsales CT (ASCP) on 6/3/2016 at 11:08 AM     HPV16 Negative Negative -   HPV18 Negative Negative -   HRHPV Negative Negative -     Reviewed and updated as needed this visit by clinical staff   Tobacco  Allergies  Meds  Problems  Med Hx  Surg Hx  Fam Hx  Soc   Hx          Reviewed and updated as needed this visit by Provider   Tobacco  Allergies  Meds  Problems  Med Hx  Surg Hx  Fam Hx  Soc   Hx         Past Medical History:   Diagnosis Date     Nicotine abuse 3/12/2015     Sleep apnea       History reviewed. No pertinent surgical history.  OB History    Para Term  AB Living   0 0 0 0 0 0   SAB IAB Ectopic Multiple Live Births   0 0 0 0 0       Review of Systems   Constitutional: Negative for chills and fever.   HENT: Positive for hearing loss. Negative for congestion, ear pain and sore throat.    Eyes: Positive for visual disturbance. Negative for pain.   Respiratory: Positive for shortness of breath. Negative for cough.    Cardiovascular: Positive for peripheral edema. Negative for chest pain and palpitations.   Gastrointestinal: Negative for abdominal pain, constipation, diarrhea, heartburn, hematochezia and nausea.   Breasts:  Negative for tenderness, breast mass and discharge.   Genitourinary: Positive for frequency. Negative for dysuria, genital sores, hematuria, pelvic pain, urgency, vaginal bleeding and vaginal discharge.   Musculoskeletal: Positive for arthralgias and myalgias. Negative for joint swelling.   Skin: Negative for rash.   Neurological: Negative for dizziness, weakness, headaches and paresthesias.   Psychiatric/Behavioral: Negative for mood changes. The patient is not nervous/anxious.         OBJECTIVE:   BP  "124/64 (BP Location: Left arm, Patient Position: Sitting, Cuff Size: Adult Large)   Pulse 76   Ht 1.66 m (5' 5.35\")   Wt 112.9 kg (249 lb)   SpO2 100%   BMI 40.99 kg/m    Physical Exam    ASSESSMENT/PLAN:     COUNSELING:    Estimated body mass index is 40.99 kg/m  as calculated from the following:    Height as of this encounter: 1.66 m (5' 5.35\").    Weight as of this encounter: 112.9 kg (249 lb).    Weight management plan: Discussed healthy diet and exercise guidelines    She reports that she quit smoking about 6 years ago. Her smoking use included cigarettes and cigarettes. She started smoking about 39 years ago. She has a 16.00 pack-year smoking history. She quit smokeless tobacco use about 6 years ago.      Counseling Resources:  ATP IV Guidelines  Pooled Cohorts Equation Calculator  Breast Cancer Risk Calculator  BRCA-Related Cancer Risk Assessment: FHS-7 Tool  FRAX Risk Assessment  ICSI Preventive Guidelines  Dietary Guidelines for Americans, 2010  USDA's MyPlate  ASA Prophylaxis  Lung CA Screening    Madisyn Stacy MD  [facility]  "

## 2023-04-19 ENCOUNTER — MYC MEDICAL ADVICE (OUTPATIENT)
Dept: FAMILY MEDICINE | Facility: CLINIC | Age: 58
End: 2023-04-19
Payer: COMMERCIAL

## 2023-04-19 DIAGNOSIS — E11.9 TYPE 2 DIABETES MELLITUS WITHOUT COMPLICATION, WITHOUT LONG-TERM CURRENT USE OF INSULIN (H): Primary | ICD-10-CM

## 2023-04-28 ENCOUNTER — MYC MEDICAL ADVICE (OUTPATIENT)
Dept: FAMILY MEDICINE | Facility: CLINIC | Age: 58
End: 2023-04-28

## 2023-04-28 ENCOUNTER — LAB (OUTPATIENT)
Dept: LAB | Facility: CLINIC | Age: 58
End: 2023-04-28
Payer: COMMERCIAL

## 2023-04-28 DIAGNOSIS — E11.9 TYPE 2 DIABETES MELLITUS WITHOUT COMPLICATION, WITHOUT LONG-TERM CURRENT USE OF INSULIN (H): ICD-10-CM

## 2023-04-28 LAB
ALBUMIN SERPL BCG-MCNC: 4.2 G/DL (ref 3.5–5.2)
ALP SERPL-CCNC: 82 U/L (ref 35–104)
ALT SERPL W P-5'-P-CCNC: 18 U/L (ref 10–35)
ANION GAP SERPL CALCULATED.3IONS-SCNC: 13 MMOL/L (ref 7–15)
AST SERPL W P-5'-P-CCNC: 20 U/L (ref 10–35)
BILIRUB SERPL-MCNC: 0.6 MG/DL
BUN SERPL-MCNC: 13.5 MG/DL (ref 8–23)
CALCIUM SERPL-MCNC: 9.8 MG/DL (ref 8.6–10)
CHLORIDE SERPL-SCNC: 102 MMOL/L (ref 98–107)
CHOLEST SERPL-MCNC: 178 MG/DL
CREAT SERPL-MCNC: 0.69 MG/DL (ref 0.51–0.95)
DEPRECATED HCO3 PLAS-SCNC: 26 MMOL/L (ref 22–29)
GFR SERPL CREATININE-BSD FRML MDRD: >90 ML/MIN/1.73M2
GLUCOSE SERPL-MCNC: 160 MG/DL (ref 70–99)
HBA1C MFR BLD: 7.1 % (ref 0–5.6)
HDLC SERPL-MCNC: 40 MG/DL
LDLC SERPL CALC-MCNC: 108 MG/DL
NONHDLC SERPL-MCNC: 138 MG/DL
POTASSIUM SERPL-SCNC: 3.9 MMOL/L (ref 3.4–5.3)
PROT SERPL-MCNC: 7.4 G/DL (ref 6.4–8.3)
SODIUM SERPL-SCNC: 141 MMOL/L (ref 136–145)
TRIGL SERPL-MCNC: 148 MG/DL

## 2023-04-28 PROCEDURE — 80053 COMPREHEN METABOLIC PANEL: CPT

## 2023-04-28 PROCEDURE — 83036 HEMOGLOBIN GLYCOSYLATED A1C: CPT

## 2023-04-28 PROCEDURE — 80061 LIPID PANEL: CPT

## 2023-04-28 PROCEDURE — 36415 COLL VENOUS BLD VENIPUNCTURE: CPT

## 2023-04-28 NOTE — TELEPHONE ENCOUNTER
"Routing refill request to provider for review/approval because:  Patient needs to be seen because it has been more than 6 months since last office visit.    Last Written Prescription Date:  08/05/2021   Last Fill Quantity: 100,  # refills: 3   Last office visit provider:  09/26/2022     Requested Prescriptions   Pending Prescriptions Disp Refills     blood glucose (CONTOUR NEXT TEST) test strip 100 strip 3     Sig: [CONTOUR NEXT TEST STRIPS STRIPS] TEST ONCE DAILY AS NEEDED       Diabetic Supplies Protocol Failed - 4/28/2023 10:48 AM        Failed - Recent (6 mo) or future (30 days) visit within the authorizing provider's specialty     Patient had office visit in the last 6 months or has a visit in the next 30 days with authorizing provider.  See \"Patient Info\" tab in inbasket, or \"Choose Columns\" in Meds & Orders section of the refill encounter.            Passed - Medication is active on med list        Passed - Patient is 18 years of age or older             Michela Peterson RN 04/28/23 3:26 PM  "

## 2023-07-28 ENCOUNTER — OFFICE VISIT (OUTPATIENT)
Dept: FAMILY MEDICINE | Facility: CLINIC | Age: 58
End: 2023-07-28
Attending: FAMILY MEDICINE
Payer: COMMERCIAL

## 2023-07-28 VITALS
SYSTOLIC BLOOD PRESSURE: 137 MMHG | OXYGEN SATURATION: 97 % | BODY MASS INDEX: 38.25 KG/M2 | TEMPERATURE: 96.8 F | WEIGHT: 238 LBS | RESPIRATION RATE: 20 BRPM | DIASTOLIC BLOOD PRESSURE: 74 MMHG | HEART RATE: 80 BPM | HEIGHT: 66 IN

## 2023-07-28 DIAGNOSIS — E66.812 CLASS 2 SEVERE OBESITY DUE TO EXCESS CALORIES WITH SERIOUS COMORBIDITY AND BODY MASS INDEX (BMI) OF 38.0 TO 38.9 IN ADULT (H): ICD-10-CM

## 2023-07-28 DIAGNOSIS — E11.9 TYPE 2 DIABETES MELLITUS WITHOUT COMPLICATION, WITHOUT LONG-TERM CURRENT USE OF INSULIN (H): Primary | ICD-10-CM

## 2023-07-28 DIAGNOSIS — Z23 ENCOUNTER FOR IMMUNIZATION: ICD-10-CM

## 2023-07-28 DIAGNOSIS — I10 ESSENTIAL HYPERTENSION, BENIGN: ICD-10-CM

## 2023-07-28 DIAGNOSIS — E66.01 CLASS 2 SEVERE OBESITY DUE TO EXCESS CALORIES WITH SERIOUS COMORBIDITY AND BODY MASS INDEX (BMI) OF 38.0 TO 38.9 IN ADULT (H): ICD-10-CM

## 2023-07-28 DIAGNOSIS — E55.9 VITAMIN D DEFICIENCY: ICD-10-CM

## 2023-07-28 LAB
ANION GAP SERPL CALCULATED.3IONS-SCNC: 13 MMOL/L (ref 7–15)
BUN SERPL-MCNC: 12.5 MG/DL (ref 8–23)
CALCIUM SERPL-MCNC: 9.7 MG/DL (ref 8.6–10)
CHLORIDE SERPL-SCNC: 104 MMOL/L (ref 98–107)
CREAT SERPL-MCNC: 0.68 MG/DL (ref 0.51–0.95)
DEPRECATED CALCIDIOL+CALCIFEROL SERPL-MC: 42 UG/L (ref 20–75)
DEPRECATED HCO3 PLAS-SCNC: 25 MMOL/L (ref 22–29)
GFR SERPL CREATININE-BSD FRML MDRD: >90 ML/MIN/1.73M2
GLUCOSE SERPL-MCNC: 123 MG/DL (ref 70–99)
HBA1C MFR BLD: 6.8 % (ref 0–5.6)
POTASSIUM SERPL-SCNC: 3.8 MMOL/L (ref 3.4–5.3)
SODIUM SERPL-SCNC: 142 MMOL/L (ref 136–145)

## 2023-07-28 PROCEDURE — 82306 VITAMIN D 25 HYDROXY: CPT | Performed by: FAMILY MEDICINE

## 2023-07-28 PROCEDURE — 36415 COLL VENOUS BLD VENIPUNCTURE: CPT | Performed by: FAMILY MEDICINE

## 2023-07-28 PROCEDURE — 83036 HEMOGLOBIN GLYCOSYLATED A1C: CPT | Performed by: FAMILY MEDICINE

## 2023-07-28 PROCEDURE — 80048 BASIC METABOLIC PNL TOTAL CA: CPT | Performed by: FAMILY MEDICINE

## 2023-07-28 PROCEDURE — 99214 OFFICE O/P EST MOD 30 MIN: CPT | Performed by: FAMILY MEDICINE

## 2023-07-28 ASSESSMENT — PAIN SCALES - GENERAL: PAINLEVEL: NO PAIN (0)

## 2023-07-28 NOTE — PROGRESS NOTES
Assessment & Plan     Type 2 diabetes mellitus without complication, without long-term current use of insulin (H)   Controlled.  Addressed smoking status and aspirin therapy.  Recommended annual eye exam and dental cares. Reviewed foot cares and foot exam.  Blood pressure and lipid management reviewed today.  Vaccines reviewed and updated.  Plan for glucose management includes ongoing focus on healthy diabetic diet and increased activity, okay to stay off meds and continue LSM per pt preference.  Labs ordered as below including:     Hemoglobin A1C   Date Value Ref Range Status   07/28/2023 6.8 (H) 0.0 - 5.6 % Final     Comment:     Normal <5.7%   Prediabetes 5.7-6.4%    Diabetes 6.5% or higher     Note: Adopted from ADA consensus guidelines.   04/28/2023 7.1 (H) 0.0 - 5.6 % Final     Comment:     Normal <5.7%   Prediabetes 5.7-6.4%    Diabetes 6.5% or higher     Note: Adopted from ADA consensus guidelines.   09/23/2022 6.7 (H) 0.0 - 5.6 % Final     Comment:     Normal <5.7%   Prediabetes 5.7-6.4%    Diabetes 6.5% or higher     Note: Adopted from ADA consensus guidelines.    ,   Lab Results   Component Value Date     (H) 04/28/2023   ,   Creatinine   Date Value Ref Range Status   07/28/2023 0.68 0.51 - 0.95 mg/dL Final        - PRIMARY CARE FOLLOW-UP SCHEDULING  - Basic metabolic panel  (Ca, Cl, CO2, Creat, Gluc, K, Na, BUN)  - Hemoglobin A1c  - PRIMARY CARE FOLLOW-UP SCHEDULING  - Basic metabolic panel  (Ca, Cl, CO2, Creat, Gluc, K, Na, BUN)  - Hemoglobin A1c    Class 2 severe obesity due to excess calories with serious comorbidity and body mass index (BMI) of 38.0 to 38.9 in adult (H)  Reviewed LSM today    Benign Essential Hypertension  BP Readings from Last 3 Encounters:   07/28/23 137/74   09/26/22 124/64   05/03/22 139/82        controlled today.  Plan for bloodpressure management includes ongoing focus on healthy DASH type diet and increased activity, encouraged to avoid tobacco products and limit  "alcohol use, stress reduction, continue losartan/hydrochlorothiazide 100/25mg daily .  Labwork and meds ordered and reviewed as below  Potassium   Date Value Ref Range Status   07/28/2023 3.8 3.4 - 5.3 mmol/L Final   04/27/2022 3.7 3.5 - 5.0 mmol/L Final      Creatinine   Date Value Ref Range Status   07/28/2023 0.68 0.51 - 0.95 mg/dL Final        - Basic metabolic panel  (Ca, Cl, CO2, Creat, Gluc, K, Na, BUN)  - Basic metabolic panel  (Ca, Cl, CO2, Creat, Gluc, K, Na, BUN)    Encounter for immunization  - COVID-19 BIVALENT 12+ (PFIZER)    Vitamin D Deficiency  Continue daily vitamin D  - Vitamin D Deficiency  - Vitamin D Deficiency           BMI:   Estimated body mass index is 38.71 kg/m  as calculated from the following:    Height as of this encounter: 1.67 m (5' 5.75\").    Weight as of this encounter: 108 kg (238 lb).   Weight management plan: Discussed healthy diet and exercise guidelines      Madisyn Stacy MD  [facility]    Peña Mahajan is a 57 year old, presenting for the following health issues:  Glucose (A1C check and wt ), Hypertension, and Diabetes        7/28/2023     8:01 AM   Additional Questions   Roomed by shaun     Mood is better- improved morning from her best friend's mom's death.  Weight is down   Fasting sugars 130s-150s at 4am 6hrs after eating last  Bedtime sugars 150-170s    Has tried every other day ppi and increased heartburn   Vitamin D decreased from 2000 to 1000     Working on portion sizes  More veggies  Healthier eating  Weight goal 200lb but last there about 10 years ago   Treats 1-2 days/week   Walking outside if not too hot- indoor exercise  Running around with friends to stay active     Denies covid booster     History of Present Illness       Diabetes:   She presents for follow up of diabetes.  She is checking home blood glucose two times daily.   She checks blood glucose before meals and at bedtime.  Blood glucose is never over 200 and never under " "70. She is aware of hypoglycemia symptoms including none.   She is concerned about other.    She is not experiencing numbness or burning in feet, excessive thirst, blurry vision, weight changes or redness, sores or blisters on feet.           Hypertension: She presents for follow up of hypertension.  She does not check blood pressure  regularly outside of the clinic. Outside blood pressures have been over 140/90. She does not follow a low salt diet.     She eats 2-3 servings of fruits and vegetables daily.She consumes 0 sweetened beverage(s) daily.She exercises with enough effort to increase her heart rate 20 to 29 minutes per day.  She exercises with enough effort to increase her heart rate 3 or less days per week.   She is taking medications regularly.         Review of Systems         Objective    /74 (BP Location: Left arm, Patient Position: Sitting, Cuff Size: Adult Large)   Pulse 80   Temp 96.8  F (36  C) (Temporal)   Resp 20   Ht 1.67 m (5' 5.75\")   Wt 108 kg (238 lb)   SpO2 97%   BMI 38.71 kg/m    Body mass index is 38.71 kg/m .  Physical Exam   Wt Readings from Last 3 Encounters:   07/28/23 108 kg (238 lb)   09/26/22 112.9 kg (249 lb)   01/11/22 112 kg (247 lb)       No LMP recorded. Patient is postmenopausal.    All normal as below except abnormalities include: grossly normal exam   General is a  57 year old sitting comfortably in no apparent distress   HEENT:  TM are clear bilaterally.  Eye exams within normal   Neck: Supple without lymphadenopathy or thyromegally  CV: Regular rate and rhythm S1S2 without rubs, murmurs or gallops,   Lungs: Clear to auscultation bilaterally  Abd:  +BS, soft NT/ND,  No masses or organomegally,   Extremities: Warm, No Edema, 2+ Pedal and radial pulses bilaterally  Skin: No lesions or rashes noted  Neuro: Able to ambulate around the exam room with equal movement, strength and normal coordination of the upper and lower extremeties symmetrically  Diabetic Foot " exam.  Normal inspection.  Callous formation is present bilaterally.  2+ pedal pulses bilaterally.  Sensation is intact to 10g monofilament.  No skin breakdown or ulceration noted.      Lab tests reviewed-1: [id number]    Madisyn Stacy MD        Prior to immunization administration, verified patients identity using patient s name and date of birth. Please see Immunization Activity for additional information.     Screening Questionnaire for Adult Immunization    Are you sick today?   No   Do you have allergies to medications, food, a vaccine component or latex?   No   Have you ever had a serious reaction after receiving a vaccination?   No   Do you have a long-term health problem with heart, lung, kidney, or metabolic disease (e.g., diabetes), asthma, a blood disorder, no spleen, complement component deficiency, a cochlear implant, or a spinal fluid leak?  Are you on long-term aspirin therapy?   Yes   Do you have cancer, leukemia, HIV/AIDS, or any other immune system problem?   No   Do you have a parent, brother, or sister with an immune system problem?   No   In the past 3 months, have you taken medications that affect  your immune system, such as prednisone, other steroids, or anticancer drugs; drugs for the treatment of rheumatoid arthritis, Crohn s disease, or psoriasis; or have you had radiation treatments?   No   Have you had a seizure, or a brain or other nervous system problem?   No   During the past year, have you received a transfusion of blood or blood    products, or been given immune (gamma) globulin or antiviral drug?   No   For women: Are you pregnant or is there a chance you could become       pregnant during the next month?   No   Have you received any vaccinations in the past 4 weeks?   No     Immunization questionnaire was positive for at least one answer.  Notified .          Screening performed by Harini Gonzalez MA on 7/28/2023 at 8:08 AM.

## 2023-09-13 ENCOUNTER — ANCILLARY PROCEDURE (OUTPATIENT)
Dept: MAMMOGRAPHY | Facility: CLINIC | Age: 58
End: 2023-09-13
Attending: FAMILY MEDICINE
Payer: COMMERCIAL

## 2023-09-13 DIAGNOSIS — Z12.31 VISIT FOR SCREENING MAMMOGRAM: ICD-10-CM

## 2023-09-13 PROCEDURE — 77067 SCR MAMMO BI INCL CAD: CPT

## 2023-09-22 ENCOUNTER — TRANSFERRED RECORDS (OUTPATIENT)
Dept: HEALTH INFORMATION MANAGEMENT | Facility: CLINIC | Age: 58
End: 2023-09-22
Payer: COMMERCIAL

## 2023-10-02 ENCOUNTER — OFFICE VISIT (OUTPATIENT)
Dept: FAMILY MEDICINE | Facility: CLINIC | Age: 58
End: 2023-10-02
Payer: COMMERCIAL

## 2023-10-02 VITALS
HEIGHT: 66 IN | SYSTOLIC BLOOD PRESSURE: 125 MMHG | DIASTOLIC BLOOD PRESSURE: 80 MMHG | TEMPERATURE: 97.5 F | BODY MASS INDEX: 37.61 KG/M2 | HEART RATE: 84 BPM | RESPIRATION RATE: 16 BRPM | OXYGEN SATURATION: 97 % | WEIGHT: 234 LBS

## 2023-10-02 DIAGNOSIS — J02.9 SORE THROAT: ICD-10-CM

## 2023-10-02 DIAGNOSIS — E11.9 TYPE 2 DIABETES MELLITUS WITHOUT COMPLICATION, WITHOUT LONG-TERM CURRENT USE OF INSULIN (H): ICD-10-CM

## 2023-10-02 DIAGNOSIS — H92.09 OTALGIA, UNSPECIFIED LATERALITY: Primary | ICD-10-CM

## 2023-10-02 LAB
DEPRECATED S PYO AG THROAT QL EIA: NEGATIVE
GROUP A STREP BY PCR: NOT DETECTED

## 2023-10-02 PROCEDURE — 99214 OFFICE O/P EST MOD 30 MIN: CPT | Performed by: FAMILY MEDICINE

## 2023-10-02 PROCEDURE — 87651 STREP A DNA AMP PROBE: CPT | Performed by: FAMILY MEDICINE

## 2023-10-02 ASSESSMENT — ENCOUNTER SYMPTOMS: SORE THROAT: 1

## 2023-10-02 NOTE — PROGRESS NOTES
Assessment & Plan     Otalgia, unspecified laterality  Likely related to irritation at the ear canal, no real sign of acute infection, we have discussed symptomatic care, she will call or send a message if her symptoms fail to improve within the next few days.    Sore throat  She has mild erythema of the tonsils area, rapid strep was negative, she seems overall improving with gargle salt and water, she will continue the same while culture is pending, she will let us know if her symptoms fail to improve.  - Streptococcus A Rapid Screen w/Reflex to PCR - Clinic Collect  - Group A Streptococcus PCR Throat Swab    Type 2 diabetes mellitus without complication, without long-term current use of insulin (H)  Planning to follow-up with PCP and get a UMAR in December.    Review of external notes as documented elsewhere in note  30 minutes spent by me on the date of the encounter doing chart review, review of outside records, review of test results, interpretation of tests, patient visit, and documentation            MD WIL [facility]    Peña Mahajan is a 57 year old, presenting for the following health issues:  Ear Problem (/) and Pharyngitis (/)      10/2/2023     9:05 AM   Additional Questions   Roomed by Kendall DE LA TORRE   Accompanied by self       History of Present Illness       Reason for visit:  Earache throat check for strep    She eats 2-3 servings of fruits and vegetables daily.She consumes 2 sweetened beverage(s) daily.She exercises with enough effort to increase her heart rate 10 to 19 minutes per day.  She exercises with enough effort to increase her heart rate 3 or less days per week.   She is taking medications regularly.       Ear ache for the past couple of nights, no trauma,Crackling sensation at both ears., she wears hearing aids, denies any ear drainage, she also laryngitis type of symptoms for the past 3 to 4 days, she been pushing fluid and doing gargle with  "improvement, no fever no chills.Saw some white spots at the back of the throat.      Review of Systems   HENT:  Positive for ear pain and sore throat.       Constitutional, HEENT, cardiovascular, pulmonary, gi and gu systems are negative, except as otherwise noted.      Objective    /80 (BP Location: Left arm, Patient Position: Sitting, Cuff Size: Thigh)   Pulse 84   Temp 97.5  F (36.4  C) (Temporal)   Resp 16   Ht 1.665 m (5' 5.55\")   Wt 106.1 kg (234 lb)   SpO2 97%   BMI 38.29 kg/m    Body mass index is 38.29 kg/m .  Physical Exam   GENERAL: healthy, alert and no distress  HENT: normal cephalic/atraumatic, nose and mouth without ulcers or lesions, oropharynx clear, oral mucous membranes moist, and TMs are normal bilaterally, minimal irritation at the right ear canal, no bleeding or drainage.  NECK: no adenopathy, no asymmetry, masses, or scars and thyroid normal to palpation  RESP: lungs clear to auscultation - no rales, rhonchi or wheezes  CV: regular rate and rhythm, normal S1 S2, no S3 or S4, no murmur, click or rub, no peripheral edema and peripheral pulses strong  ABDOMEN: soft, nontender, no hepatosplenomegaly, no masses and bowel sounds normal  MS: no gross musculoskeletal defects noted, no edema    Results for orders placed or performed in visit on 10/02/23   Streptococcus A Rapid Screen w/Reflex to PCR - Clinic Collect     Status: Normal    Specimen: Throat; Swab   Result Value Ref Range    Group A Strep antigen Negative Negative       This note was completed in part using a voice recognition software, any grammatical or context distortion are unintentional and inherent to the software.                "

## 2023-10-05 ENCOUNTER — MYC MEDICAL ADVICE (OUTPATIENT)
Dept: FAMILY MEDICINE | Facility: CLINIC | Age: 58
End: 2023-10-05
Payer: COMMERCIAL

## 2023-10-05 DIAGNOSIS — H66.93 OTITIS MEDIA FOLLOW-UP, NOT RESOLVED, BILATERAL: Primary | ICD-10-CM

## 2023-10-05 RX ORDER — AMOXICILLIN 875 MG
875 TABLET ORAL 2 TIMES DAILY
Qty: 14 TABLET | Refills: 0 | Status: SHIPPED | OUTPATIENT
Start: 2023-10-05 | End: 2023-10-12

## 2023-10-05 NOTE — TELEPHONE ENCOUNTER
Patient was seen on 10/2/23 by Dr Echeverria.  Message routed to Dr Echeverria for review / plan.    Ann Mark RN  [facility]      [facility]/Ob Support Pool  Phone Number: [phone]     Good Morning Dr Echeverria    I was in on Monday morning about my ears & throat. Test were done that I didn't have strep and you said it's just a virus not bacterial and let it run its course.. When you check my ears you said they look cleared.  For the most part I am feeling much better than Monday. But my ears have been plugged and when I blow my nose they crackle/pop & feels like they plug up then unplug. Is there any meds that you can prescribe for them or just let them be and they'll clear on their own?  Thank you  Zaina Shaikh

## 2023-10-23 ENCOUNTER — TRANSFERRED RECORDS (OUTPATIENT)
Dept: HEALTH INFORMATION MANAGEMENT | Facility: CLINIC | Age: 58
End: 2023-10-23
Payer: COMMERCIAL

## 2023-10-23 LAB — RETINOPATHY: NEGATIVE

## 2023-12-11 DIAGNOSIS — K21.9 GERD (GASTROESOPHAGEAL REFLUX DISEASE): ICD-10-CM

## 2023-12-11 DIAGNOSIS — I10 ESSENTIAL HYPERTENSION, BENIGN: Primary | ICD-10-CM

## 2023-12-11 RX ORDER — AMLODIPINE BESYLATE 10 MG/1
10 TABLET ORAL DAILY
Qty: 90 TABLET | Refills: 2 | Status: SHIPPED | OUTPATIENT
Start: 2023-12-11 | End: 2024-09-09

## 2023-12-19 DIAGNOSIS — I10 ESSENTIAL HYPERTENSION, BENIGN: ICD-10-CM

## 2023-12-19 RX ORDER — LOSARTAN POTASSIUM AND HYDROCHLOROTHIAZIDE 25; 100 MG/1; MG/1
1 TABLET ORAL DAILY
Qty: 90 TABLET | Refills: 0 | Status: SHIPPED | OUTPATIENT
Start: 2023-12-19 | End: 2024-01-19

## 2023-12-23 ENCOUNTER — HEALTH MAINTENANCE LETTER (OUTPATIENT)
Age: 58
End: 2023-12-23

## 2023-12-26 DIAGNOSIS — E78.2 MIXED HYPERLIPIDEMIA: ICD-10-CM

## 2023-12-26 DIAGNOSIS — E11.9 TYPE 2 DIABETES MELLITUS WITHOUT COMPLICATION, WITHOUT LONG-TERM CURRENT USE OF INSULIN (H): ICD-10-CM

## 2023-12-27 RX ORDER — ATORVASTATIN CALCIUM 80 MG/1
TABLET, FILM COATED ORAL
Qty: 90 TABLET | Refills: 0 | Status: SHIPPED | OUTPATIENT
Start: 2023-12-27 | End: 2024-01-19

## 2024-01-18 ASSESSMENT — ENCOUNTER SYMPTOMS
HEMATOCHEZIA: 0
JOINT SWELLING: 0
NAUSEA: 0
ABDOMINAL PAIN: 0
BREAST MASS: 0
SORE THROAT: 0
PARESTHESIAS: 0
CONSTIPATION: 0
SHORTNESS OF BREATH: 0
HEARTBURN: 0
CHILLS: 0
COUGH: 0
FEVER: 0
MYALGIAS: 0
WEAKNESS: 0
ARTHRALGIAS: 1
DIZZINESS: 0
FREQUENCY: 1
DIARRHEA: 0
EYE PAIN: 0
DYSURIA: 0
HEADACHES: 0
PALPITATIONS: 0
HEMATURIA: 0
NERVOUS/ANXIOUS: 0

## 2024-01-19 ENCOUNTER — OFFICE VISIT (OUTPATIENT)
Dept: FAMILY MEDICINE | Facility: CLINIC | Age: 59
End: 2024-01-19
Payer: COMMERCIAL

## 2024-01-19 VITALS
WEIGHT: 235 LBS | BODY MASS INDEX: 37.77 KG/M2 | HEIGHT: 66 IN | SYSTOLIC BLOOD PRESSURE: 126 MMHG | TEMPERATURE: 97.9 F | HEART RATE: 70 BPM | OXYGEN SATURATION: 98 % | RESPIRATION RATE: 18 BRPM | DIASTOLIC BLOOD PRESSURE: 76 MMHG

## 2024-01-19 DIAGNOSIS — Z00.00 ROUTINE GENERAL MEDICAL EXAMINATION AT A HEALTH CARE FACILITY: ICD-10-CM

## 2024-01-19 DIAGNOSIS — I10 ESSENTIAL HYPERTENSION, BENIGN: ICD-10-CM

## 2024-01-19 DIAGNOSIS — E78.2 MIXED HYPERLIPIDEMIA: ICD-10-CM

## 2024-01-19 DIAGNOSIS — E66.01 CLASS 2 SEVERE OBESITY DUE TO EXCESS CALORIES WITH SERIOUS COMORBIDITY AND BODY MASS INDEX (BMI) OF 38.0 TO 38.9 IN ADULT (H): ICD-10-CM

## 2024-01-19 DIAGNOSIS — N30.00 ACUTE CYSTITIS WITHOUT HEMATURIA: ICD-10-CM

## 2024-01-19 DIAGNOSIS — E11.9 TYPE 2 DIABETES MELLITUS WITHOUT COMPLICATION, WITHOUT LONG-TERM CURRENT USE OF INSULIN (H): Primary | ICD-10-CM

## 2024-01-19 DIAGNOSIS — Z12.11 COLON CANCER SCREENING: ICD-10-CM

## 2024-01-19 DIAGNOSIS — R39.15 URINARY URGENCY: ICD-10-CM

## 2024-01-19 DIAGNOSIS — E66.812 CLASS 2 SEVERE OBESITY DUE TO EXCESS CALORIES WITH SERIOUS COMORBIDITY AND BODY MASS INDEX (BMI) OF 38.0 TO 38.9 IN ADULT (H): ICD-10-CM

## 2024-01-19 PROBLEM — R23.2 HOT FLASHES: Status: RESOLVED | Noted: 2017-09-13 | Resolved: 2024-01-19

## 2024-01-19 LAB
ALBUMIN UR-MCNC: NEGATIVE MG/DL
APPEARANCE UR: CLEAR
BACTERIA #/AREA URNS HPF: ABNORMAL /HPF
BILIRUB UR QL STRIP: NEGATIVE
COLOR UR AUTO: YELLOW
GLUCOSE UR STRIP-MCNC: NEGATIVE MG/DL
HBA1C MFR BLD: 6.9 % (ref 0–5.6)
HGB UR QL STRIP: NEGATIVE
KETONES UR STRIP-MCNC: NEGATIVE MG/DL
LEUKOCYTE ESTERASE UR QL STRIP: NEGATIVE
NITRATE UR QL: NEGATIVE
PH UR STRIP: 7.5 [PH] (ref 5–8)
RBC #/AREA URNS AUTO: ABNORMAL /HPF
SP GR UR STRIP: 1.02 (ref 1–1.03)
SQUAMOUS #/AREA URNS AUTO: ABNORMAL /LPF
UROBILINOGEN UR STRIP-ACNC: 0.2 E.U./DL
WBC #/AREA URNS AUTO: ABNORMAL /HPF

## 2024-01-19 PROCEDURE — 87186 SC STD MICRODIL/AGAR DIL: CPT | Performed by: FAMILY MEDICINE

## 2024-01-19 PROCEDURE — 82043 UR ALBUMIN QUANTITATIVE: CPT | Performed by: FAMILY MEDICINE

## 2024-01-19 PROCEDURE — 83036 HEMOGLOBIN GLYCOSYLATED A1C: CPT | Performed by: FAMILY MEDICINE

## 2024-01-19 PROCEDURE — 99214 OFFICE O/P EST MOD 30 MIN: CPT | Mod: 25 | Performed by: FAMILY MEDICINE

## 2024-01-19 PROCEDURE — 36415 COLL VENOUS BLD VENIPUNCTURE: CPT | Performed by: FAMILY MEDICINE

## 2024-01-19 PROCEDURE — 80053 COMPREHEN METABOLIC PANEL: CPT | Performed by: FAMILY MEDICINE

## 2024-01-19 PROCEDURE — 87086 URINE CULTURE/COLONY COUNT: CPT | Performed by: FAMILY MEDICINE

## 2024-01-19 PROCEDURE — 80061 LIPID PANEL: CPT | Performed by: FAMILY MEDICINE

## 2024-01-19 PROCEDURE — 81001 URINALYSIS AUTO W/SCOPE: CPT | Performed by: FAMILY MEDICINE

## 2024-01-19 PROCEDURE — 87088 URINE BACTERIA CULTURE: CPT | Performed by: FAMILY MEDICINE

## 2024-01-19 PROCEDURE — 99396 PREV VISIT EST AGE 40-64: CPT | Performed by: FAMILY MEDICINE

## 2024-01-19 PROCEDURE — 82570 ASSAY OF URINE CREATININE: CPT | Performed by: FAMILY MEDICINE

## 2024-01-19 RX ORDER — LOSARTAN POTASSIUM AND HYDROCHLOROTHIAZIDE 25; 100 MG/1; MG/1
1 TABLET ORAL DAILY
Qty: 90 TABLET | Refills: 4 | Status: SHIPPED | OUTPATIENT
Start: 2024-01-19

## 2024-01-19 RX ORDER — ATORVASTATIN CALCIUM 80 MG/1
80 TABLET, FILM COATED ORAL DAILY
Qty: 90 TABLET | Refills: 4 | Status: SHIPPED | OUTPATIENT
Start: 2024-01-19

## 2024-01-19 ASSESSMENT — ENCOUNTER SYMPTOMS
WEAKNESS: 0
FEVER: 0
CONSTIPATION: 0
ABDOMINAL PAIN: 0
HEADACHES: 0
DIARRHEA: 0
DIZZINESS: 0
SHORTNESS OF BREATH: 0
CHILLS: 0
JOINT SWELLING: 0
COUGH: 0
PALPITATIONS: 0
HEMATURIA: 0
FREQUENCY: 1
NAUSEA: 0
EYE PAIN: 0
NERVOUS/ANXIOUS: 0
SORE THROAT: 0
DYSURIA: 0
ARTHRALGIAS: 1
MYALGIAS: 0

## 2024-01-19 NOTE — PROGRESS NOTES
Preventive Care Visit  [facility]  Madisyn Stacy MD, Family Medicine  Jan 19, 2024    [facility] Health Maintenance Exam  [facility]  Phone : CECILIA    Assessment/Plan     1. Preventive Health Visit as outlined below.   Health maintenance discussed as appropriate for age and risk factors including:  Nutrition, exercise, alcohol use, tobacco use, safe sexual practices, dental health.  Cancer screening assessed and ordered as indicated. Routine labs as outlined below based on age and risk factors reviewed today. Immunizations reviewed and updated.       Type 2 diabetes mellitus without complication, without long-term current use of insulin (H)  Well Controlled.  Addressed smoking status and aspirin therapy.  Recommended annual eye exam and dental cares. Reviewed foot cares and foot exam.  Blood pressure and lipid management reviewed today.  Vaccines reviewed and updated.  Plan for glucose management includes ongoing focus on healthy diabetic diet and increased activity, continue off meds.  Labs ordered as below including:     Hemoglobin A1C   Date Value Ref Range Status   01/19/2024 6.9 (H) 0.0 - 5.6 % Final     Comment:     Normal <5.7%   Prediabetes 5.7-6.4%    Diabetes 6.5% or higher     Note: Adopted from ADA consensus guidelines.   07/28/2023 6.8 (H) 0.0 - 5.6 % Final     Comment:     Normal <5.7%   Prediabetes 5.7-6.4%    Diabetes 6.5% or higher     Note: Adopted from ADA consensus guidelines.   04/28/2023 7.1 (H) 0.0 - 5.6 % Final     Comment:     Normal <5.7%   Prediabetes 5.7-6.4%    Diabetes 6.5% or higher     Note: Adopted from ADA consensus guidelines.    ,   Lab Results   Component Value Date    LDL 82 01/19/2024   ,   Creatinine   Date Value Ref Range Status   01/19/2024 0.71 0.51 - 0.95 mg/dL Final        - HEMOGLOBIN A1C  - Comprehensive metabolic panel (BMP + Alb, Alk Phos, ALT, AST, Total. Bili, TP)  - Lipid Profile (Chol, Trig, HDL, LDL calc)  - Albumin  Random Urine Quantitative with Creat Ratio  - HEMOGLOBIN A1C  - Comprehensive metabolic panel (BMP + Alb, Alk Phos, ALT, AST, Total. Bili, TP)  - Lipid Profile (Chol, Trig, HDL, LDL calc)  - Albumin Random Urine Quantitative with Creat Ratio  - atorvastatin (LIPITOR) 80 MG tablet  Dispense: 90 tablet; Refill: 4    Routine general medical examination at a health care facility    Class 2 severe obesity due to excess calories with serious comorbidity and body mass index (BMI) of 38.0 to 38.9 in adult (H)  Reviewed LSM    Benign Essential Hypertension  BP Readings from Last 3 Encounters:   01/19/24 126/76   10/02/23 125/80   07/28/23 137/74        controlled today.  Plan for bloodpressure management includes ongoing focus on healthy DASH type diet and increased activity, encouraged to avoid tobacco products and limit alcohol use, stress reduction, contineu amlodipine 10mg daily, losartan/hydrochlorothiazide 100/25mg daily.  Labwork and meds ordered and reviewed as below  Potassium   Date Value Ref Range Status   01/19/2024 4.2 3.4 - 5.3 mmol/L Final   04/27/2022 3.7 3.5 - 5.0 mmol/L Final      Creatinine   Date Value Ref Range Status   01/19/2024 0.71 0.51 - 0.95 mg/dL Final        - Comprehensive metabolic panel (BMP + Alb, Alk Phos, ALT, AST, Total. Bili, TP)  - Comprehensive metabolic panel (BMP + Alb, Alk Phos, ALT, AST, Total. Bili, TP)  - losartan-hydrochlorothiazide (HYZAAR) 100-25 MG tablet  Dispense: 90 tablet; Refill: 4    Colon cancer screening  Due this summer  - Colonoscopy Screening  Referral    Mixed hyperlipidemia  Continue high dose statin   - atorvastatin (LIPITOR) 80 MG tablet  Dispense: 90 tablet; Refill: 4    Urinary urgency  - UA with Microscopic reflex to Culture - Clinic Collect  - UA Microscopic with Reflex to Culture  - Urine Culture    Acute cystitis without hematuria  - sulfamethoxazole-trimethoprim (BACTRIM) 400-80 MG tablet  Dispense: 10 tablet; Refill: 0        Follow-up Visit    Expected date:  May 19, 2024 (Approximate)      Follow Up Appointment Details:     Follow-up with whom?: PCP    Follow-Up for what?: Chronic Disease f/u    Chronic Disease f/u:  Diabetes  Hypertension  Hyperlipidemia       How?: In Person             Follow-up Visit   Expected date:  Jan 19, 2025 (Approximate)      Follow Up Appointment Details:     Follow-up with whom?: PCP    Follow-Up for what?: Adult Preventive    How?: In Person                     HPI:     Chief Complaint   Patient presents with    Physical       Zaina A Dinorahboldt is a 58 year old female and is here for a comprehensive health maintenance exam.     Other concerns today:   Diabetes- working on watching carbs and sugar   Doing chair exercise and outside walking when weather permits  Not checking sugars daily  Fasting 120-130s  Bedtime under 180     Got mammogram  Hearing recehck next month  Eye exam- saw a spot but recheck was okay  Dental checkup regularly- getting crown.      Still achy and stiff- tylenol as needed and topical aspercream   Knee replacement in the future once medicare   Has gotten shots in left hip and knees but never very helpful.   [facility]   Pain worse if she is in one position for 2 hours- sleeping     Urine urgency- maybe frequency if she drinks a lot.   Able to hold okay   Nocturia x2/night.      Declines vaccines recommended today     Old Records-1: Outside allergies, meds, problems and immunizations were reconciled as needed from [facility]  Lab tests reviewed-1: 2023    Review of Systems   Complete ROS including General, HEENT, CV, Pulmonary, GI, , Genital, Neuro, Psych, MSK, Heme, Endocrine all within normal except as noted in the HPI or below as documented by patient.       Prevention of Osteoporosis:calcium in diet, vitamin D supplement, walking  Last Dexa:na    Cancer Screening:  Hemoccults/Colonoscopy: due 2024  Mammogram:  normal 2023  Pap Smear:  normal 2021- repeat 2026  Lung Cancer: na      Wt Readings  from Last 3 Encounters:   24 106.6 kg (235 lb)   10/02/23 106.1 kg (234 lb)   23 108 kg (238 lb)         Complete physical exam including:  General, HEENT, Neck, breast, back, CV, Pulmonary, Abdominal, extremities, skin, neuro, psych, lymph, genital exams all within normal.    Abnormalities include:  all normal      SUBJECTIVE:   Zaina is a 58 year old, presenting for the following:  Physical        2024     8:09 AM   Additional Questions   Roomed by Kendall DE LA TORRE     Healthy Habits:     Getting at least 3 servings of Calcium per day:  Yes    Bi-annual eye exam:  Yes    Dental care twice a year:  Yes    Sleep apnea or symptoms of sleep apnea:  Sleep apnea    Diet:  Regular (no restrictions)    Frequency of exercise:  2-3 days/week    Duration of exercise:  15-30 minutes    Taking medications regularly:  Yes    Medication side effects:  None    Additional concerns today:  No      Today's PHQ-2 Score:       2024     1:58 PM   PHQ-2 (  [employer])   Q1: Little interest or pleasure in doing things 0   Q2: Feeling down, depressed or hopeless 0   PHQ-2 Score 0   Q1: Little interest or pleasure in doing things Not at all   Q2: Feeling down, depressed or hopeless Not at all   PHQ-2 Score 0     Via the Health Maintenance questionnaire, the patient has reported the following services have been completed -Foot Exam, this information has been sent to the abstraction team.      Social History     Tobacco Use    Smoking status: Former     Packs/day: 0.50     Years: 8.00     Additional pack years: 0.00     Total pack years: 4.00     Types: Cigarettes     Start date: 1983     Quit date: 2015     Years since quittin.1     Passive exposure: Past    Smokeless tobacco: Former     Quit date: 2015   Substance Use Topics    Alcohol use: Yes     Comment: a couple times a year             2024     1:58 PM   Alcohol Use   Prescreen: >3 drinks/day or >7 drinks/week? No     Reviewed orders with patient.   Reviewed health maintenance and updated orders accordingly - Yes    Breast Cancer Screenin/19/2022    10:44 AM   Breast CA Risk Assessment (FHS-7)   Do you have a family history of breast, colon, or ovarian cancer? No / Unknown       Mammogram Screening: Recommended mammography every 1-2 years with patient discussion and risk factor consideration  Pertinent mammograms are reviewed under the imaging tab.    History of abnormal Pap smear: NO - age 30-65 PAP every 5 years with negative HPV co-testing recommended      Latest Ref Rng & Units 9/10/2021    10:47 AM 2016    12:53 PM   PAP / HPV   PAP  Negative for Intraepithelial Lesion or Malignancy (NILM)  Negative for squamous intraepithelial lesion or malignancy  Electronically signed by Johnna Gonsales CT (ASCP) on 6/3/2016 at 11:08 AM      HPV 16 DNA Negative Negative     HPV 18 DNA Negative Negative     Other HR HPV Negative Negative       Reviewed and updated as needed this visit by clinical staff   Tobacco  Allergies  Meds  Problems  Med Hx  Surg Hx  Fam Hx  Soc   Hx        Reviewed and updated as needed this visit by Provider   Tobacco  Allergies  Meds  Problems  Med Hx  Surg Hx  Fam Hx  Soc   Hx Sexual Activity        Past Medical History:   Diagnosis Date    Arthritis     Diabetes (H)     Hypertension     Nicotine abuse 2015    Sleep apnea       Past Surgical History:   Procedure Laterality Date    COLONOSCOPY       OB History    Para Term  AB Living   0 0 0 0 0 0   SAB IAB Ectopic Multiple Live Births   0 0 0 0 0     Review of Systems   Constitutional:  Negative for chills and fever.   HENT:  Positive for hearing loss. Negative for congestion, ear pain and sore throat.    Eyes:  Negative for pain and visual disturbance.   Respiratory:  Negative for cough and shortness of breath.    Cardiovascular:  Negative for chest pain and palpitations.   Gastrointestinal:  Negative for abdominal pain, constipation,  "diarrhea and nausea.   Genitourinary:  Positive for frequency. Negative for dysuria, genital sores, hematuria, pelvic pain, urgency, vaginal bleeding and vaginal discharge.   Musculoskeletal:  Positive for arthralgias. Negative for joint swelling and myalgias.   Skin:  Negative for rash.   Neurological:  Negative for dizziness, weakness and headaches.   Psychiatric/Behavioral:  The patient is not nervous/anxious.        OBJECTIVE:   /76 (BP Location: Left arm, Patient Position: Sitting, Cuff Size: Thigh)   Pulse 70   Temp 97.9  F (36.6  C) (Oral)   Resp 18   Ht 1.665 m (5' 5.55\")   Wt 106.6 kg (235 lb)   SpO2 98%   BMI 38.45 kg/m     Estimated body mass index is 38.45 kg/m  as calculated from the following:    Height as of this encounter: 1.665 m (5' 5.55\").    Weight as of this encounter: 106.6 kg (235 lb).  Physical Exam      ASSESSMENT/PLAN:     Counseling    BMI  Estimated body mass index is 38.45 kg/m  as calculated from the following:    Height as of this encounter: 1.665 m (5' 5.55\").    Weight as of this encounter: 106.6 kg (235 lb).   Weight management plan: Discussed healthy diet and exercise guidelines      She reports that she quit smoking about 8 years ago. Her smoking use included cigarettes. She started smoking about 41 years ago. She has a 4 pack-year smoking history. She has been exposed to tobacco smoke. She quit smokeless tobacco use about 8 years ago.          Signed Electronically by: Madisyn Stacy MD    Prior to immunization administration, verified patients identity using patient s name and date of birth. Please see Immunization Activity for additional information.     Screening Questionnaire for Adult Immunization    Are you sick today?   No   Do you have allergies to medications, food, a vaccine component or latex?   No   Have you ever had a serious reaction after receiving a vaccination?   No   Do you have a long-term health problem with heart, lung, kidney, or metabolic " disease (e.g., diabetes), asthma, a blood disorder, no spleen, complement component deficiency, a cochlear implant, or a spinal fluid leak?  Are you on long-term aspirin therapy?   No   Do you have cancer, leukemia, HIV/AIDS, or any other immune system problem?   No   Do you have a parent, brother, or sister with an immune system problem?   No   In the past 3 months, have you taken medications that affect  your immune system, such as prednisone, other steroids, or anticancer drugs; drugs for the treatment of rheumatoid arthritis, Crohn s disease, or psoriasis; or have you had radiation treatments?   No   Have you had a seizure, or a brain or other nervous system problem?   No   During the past year, have you received a transfusion of blood or blood    products, or been given immune (gamma) globulin or antiviral drug?   No   For women: Are you pregnant or is there a chance you could become       pregnant during the next month?   No   Have you received any vaccinations in the past 4 weeks?   No     Immunization questionnaire answers were all negative.      Patient instructed to remain in clinic for 15 minutes afterwards, and to report any adverse reactions.     Screening performed by Kendall Tim MA on 1/19/2024 at 8:13 AM.        Answers submitted by the patient for this visit:  Annual Preventive Visit (Submitted on 1/18/2024)  Chief Complaint: Annual Exam:  Blood in stool: No  heartburn: No  peripheral edema: No  mood changes: No  Skin sensation changes: No  tenderness: No  breast mass: No  breast discharge: No

## 2024-01-20 LAB
ALBUMIN SERPL BCG-MCNC: 4.2 G/DL (ref 3.5–5.2)
ALP SERPL-CCNC: 78 U/L (ref 40–150)
ALT SERPL W P-5'-P-CCNC: 18 U/L (ref 0–50)
ANION GAP SERPL CALCULATED.3IONS-SCNC: 11 MMOL/L (ref 7–15)
AST SERPL W P-5'-P-CCNC: 19 U/L (ref 0–45)
BILIRUB SERPL-MCNC: 0.5 MG/DL
BUN SERPL-MCNC: 13.3 MG/DL (ref 8–23)
CALCIUM SERPL-MCNC: 9.6 MG/DL (ref 8.8–10.2)
CHLORIDE SERPL-SCNC: 103 MMOL/L (ref 98–107)
CHOLEST SERPL-MCNC: 150 MG/DL
CREAT SERPL-MCNC: 0.71 MG/DL (ref 0.51–0.95)
CREAT UR-MCNC: 86.1 MG/DL
DEPRECATED HCO3 PLAS-SCNC: 28 MMOL/L (ref 22–29)
EGFRCR SERPLBLD CKD-EPI 2021: >90 ML/MIN/1.73M2
FASTING STATUS PATIENT QL REPORTED: YES
GLUCOSE SERPL-MCNC: 129 MG/DL (ref 70–99)
HDLC SERPL-MCNC: 41 MG/DL
LDLC SERPL CALC-MCNC: 82 MG/DL
MICROALBUMIN UR-MCNC: 16.9 MG/L
MICROALBUMIN/CREAT UR: 19.63 MG/G CR (ref 0–25)
NONHDLC SERPL-MCNC: 109 MG/DL
POTASSIUM SERPL-SCNC: 4.2 MMOL/L (ref 3.4–5.3)
PROT SERPL-MCNC: 7.2 G/DL (ref 6.4–8.3)
SODIUM SERPL-SCNC: 142 MMOL/L (ref 135–145)
TRIGL SERPL-MCNC: 135 MG/DL

## 2024-01-21 ENCOUNTER — MYC MEDICAL ADVICE (OUTPATIENT)
Dept: FAMILY MEDICINE | Facility: CLINIC | Age: 59
End: 2024-01-21
Payer: COMMERCIAL

## 2024-01-21 DIAGNOSIS — N30.00 ACUTE CYSTITIS WITHOUT HEMATURIA: ICD-10-CM

## 2024-01-21 LAB — BACTERIA UR CULT: ABNORMAL

## 2024-01-21 RX ORDER — SULFAMETHOXAZOLE AND TRIMETHOPRIM 400; 80 MG/1; MG/1
1 TABLET ORAL 2 TIMES DAILY
Qty: 10 TABLET | Refills: 0 | Status: SHIPPED | OUTPATIENT
Start: 2024-01-21 | End: 2024-01-26

## 2024-01-26 ENCOUNTER — MYC MEDICAL ADVICE (OUTPATIENT)
Dept: FAMILY MEDICINE | Facility: CLINIC | Age: 59
End: 2024-01-26
Payer: COMMERCIAL

## 2024-01-26 RX ORDER — SULFAMETHOXAZOLE AND TRIMETHOPRIM 400; 80 MG/1; MG/1
1 TABLET ORAL 2 TIMES DAILY
Qty: 10 TABLET | Refills: 0 | Status: SHIPPED | OUTPATIENT
Start: 2024-01-26 | End: 2024-09-13

## 2024-02-02 ENCOUNTER — MYC MEDICAL ADVICE (OUTPATIENT)
Dept: FAMILY MEDICINE | Facility: CLINIC | Age: 59
End: 2024-02-02

## 2024-02-02 ENCOUNTER — LAB (OUTPATIENT)
Dept: LAB | Facility: CLINIC | Age: 59
End: 2024-02-02
Payer: COMMERCIAL

## 2024-02-02 ENCOUNTER — MYC MEDICAL ADVICE (OUTPATIENT)
Dept: FAMILY MEDICINE | Facility: CLINIC | Age: 59
End: 2024-02-02
Payer: COMMERCIAL

## 2024-02-02 DIAGNOSIS — R35.89 POLYURIA: ICD-10-CM

## 2024-02-02 DIAGNOSIS — R35.89 POLYURIA: Primary | ICD-10-CM

## 2024-02-02 LAB
ALBUMIN UR-MCNC: NEGATIVE MG/DL
APPEARANCE UR: CLEAR
BACTERIA #/AREA URNS HPF: ABNORMAL /HPF
BILIRUB UR QL STRIP: NEGATIVE
COLOR UR AUTO: YELLOW
GLUCOSE UR STRIP-MCNC: NEGATIVE MG/DL
HGB UR QL STRIP: NEGATIVE
KETONES UR STRIP-MCNC: NEGATIVE MG/DL
LEUKOCYTE ESTERASE UR QL STRIP: ABNORMAL
NITRATE UR QL: NEGATIVE
PH UR STRIP: 5.5 [PH] (ref 5–8)
RBC #/AREA URNS AUTO: ABNORMAL /HPF
SP GR UR STRIP: >=1.03 (ref 1–1.03)
SQUAMOUS #/AREA URNS AUTO: ABNORMAL /LPF
UROBILINOGEN UR STRIP-ACNC: 0.2 E.U./DL
WBC #/AREA URNS AUTO: ABNORMAL /HPF

## 2024-02-02 PROCEDURE — 81001 URINALYSIS AUTO W/SCOPE: CPT

## 2024-02-02 NOTE — TELEPHONE ENCOUNTER
Contacted patient and scheduled a lab only appointment for today, 2/2/24  No further action needed.    Ann Mark RN  [facility]    Morning Dr Stacy  I took a home Uti test this morning .. I had taken my last  my last antibotic pill wednesday morning and I just wanted to check that it's gone with a home test.  I feel like it's gone but just wanted to be sure.  I'm not sure how accurate the home test are AZO one.    The THALIA's changed to a pink color not the dark brown and the nitrates didn't change color.  Do you think they're accurate...?  Just wondering what the pink color on THALIA's mean...  Let me know your thoughts and if you think I'm good to go or should i come in for a lab only to check.... Ugh just want to know I'm ok  Thank you  Zaina

## 2024-05-11 ENCOUNTER — HEALTH MAINTENANCE LETTER (OUTPATIENT)
Age: 59
End: 2024-05-11

## 2024-05-24 ENCOUNTER — LAB (OUTPATIENT)
Dept: LAB | Facility: CLINIC | Age: 59
End: 2024-05-24
Payer: COMMERCIAL

## 2024-05-24 DIAGNOSIS — E11.9 TYPE 2 DIABETES MELLITUS WITHOUT COMPLICATION, WITHOUT LONG-TERM CURRENT USE OF INSULIN (H): Primary | ICD-10-CM

## 2024-05-24 LAB — HBA1C MFR BLD: 6.7 % (ref 0–5.6)

## 2024-05-24 PROCEDURE — 36415 COLL VENOUS BLD VENIPUNCTURE: CPT

## 2024-05-24 PROCEDURE — 83036 HEMOGLOBIN GLYCOSYLATED A1C: CPT

## 2024-06-17 PROBLEM — Z71.89 ADVANCED DIRECTIVES, COUNSELING/DISCUSSION: Status: RESOLVED | Noted: 2022-09-26 | Resolved: 2024-06-17

## 2024-06-25 ENCOUNTER — MYC MEDICAL ADVICE (OUTPATIENT)
Dept: FAMILY MEDICINE | Facility: CLINIC | Age: 59
End: 2024-06-25
Payer: COMMERCIAL

## 2024-06-25 DIAGNOSIS — Z12.11 COLON CANCER SCREENING: Primary | ICD-10-CM

## 2024-06-25 NOTE — TELEPHONE ENCOUNTER
LM for [facility] in [place] to contact pt to schedule an appt., I also called to inform pt that I LM for the colon clinic to call pt for scheduling. I will call them again tomorrow as my second attempt.

## 2024-06-26 NOTE — TELEPHONE ENCOUNTER
LM#2 for [facility] in [place] to contact pt to schedule an appt., I also called to inform pt that I LM for the colon clinic to call pt for scheduling. I will call them again tomorrow as my final attempt.

## 2024-06-27 NOTE — TELEPHONE ENCOUNTER
Final attempt, LM to [facility]. Pt has not received a call from them either. Pt says she is fine she can wait for a call back from them if not she will try to get it done next year. Completing task.

## 2024-08-28 ENCOUNTER — OFFICE VISIT (OUTPATIENT)
Dept: FAMILY MEDICINE | Facility: CLINIC | Age: 59
End: 2024-08-28
Payer: COMMERCIAL

## 2024-08-28 VITALS
TEMPERATURE: 98.7 F | RESPIRATION RATE: 18 BRPM | HEART RATE: 77 BPM | DIASTOLIC BLOOD PRESSURE: 79 MMHG | OXYGEN SATURATION: 98 % | SYSTOLIC BLOOD PRESSURE: 138 MMHG

## 2024-08-28 DIAGNOSIS — E11.9 TYPE 2 DIABETES MELLITUS WITHOUT COMPLICATION, WITHOUT LONG-TERM CURRENT USE OF INSULIN (H): ICD-10-CM

## 2024-08-28 DIAGNOSIS — L03.032 PARONYCHIA OF TOE, LEFT: Primary | ICD-10-CM

## 2024-08-28 PROCEDURE — 99213 OFFICE O/P EST LOW 20 MIN: CPT | Performed by: PHYSICIAN ASSISTANT

## 2024-08-28 RX ORDER — CEFADROXIL 500 MG/1
500 CAPSULE ORAL 2 TIMES DAILY
Qty: 14 CAPSULE | Refills: 0 | Status: SHIPPED | OUTPATIENT
Start: 2024-08-28 | End: 2024-09-04

## 2024-08-28 NOTE — PROGRESS NOTES
Patient presents with:  Toe Pain: [place] thought it was a blister that developed. Bottom of tow is all inflamed and red, blood coming from toenail.       Clinical Decision Making:  Patient is treated with cefadroxil for paronychia of the left toe.  It appears that the area has spontaneously drained.  There is no need for draining in the office. Expected course of resolution and indication for return was gone over and questions were answered to patient/parent's satisfaction before discharge.      ICD-10-CM    1. Paronychia of toe, left  L03.032 cefadroxil (DURICEF) 500 MG capsule          Patient Instructions     Hot packs to the area 3 times per day 20 minutes on each hour.  Do not fall asleep with the hot packs on the skin.  Take the antibiotic as written  Symptoms should not be getting worse over the next 24 hours after taking the antibiotic.  Should have start of improvement after 48 hours on the antibiotic  Use of probiotics to help prevent diarrhea.  Separate dosing of the antibiotic from the probiotic by 2 hours or they are not in the stomach at the same time.  Use of over-the-counter yogurt for helping with stomach upset and diarrhea.  Return to clinic if not getting good resolution or if new symptoms or concerns arise.      HPI:  Zaina Shaikh is a 58 year old female who has a past medical history for diabetes mellitus type 2 presents today for pain redness irritation and swelling on the left hallux.  Patient states that 1 week ago on Thursday she was [place] the [place] and had been walking around the fair for 8 hours.  Her sandal rubbed on her toe and caused a blister.  Subsequently the blister had popped and the toe had become red swollen tender and painful.  She noted that yesterday there is a small amount of serosanguineous fluid that had discharge from the toe.  She is now presenting to clinic for evaluation and treatment.  She does not have constitutional symptoms to include fever  chills night sweats or fatigue had no involvement anywhere else on the body at this time.    History obtained from chart review and the patient.    Problem List:  2024: Urinary urgency  2022: Advanced directives, counseling/discussion  2022: BP check  2021: Primary osteoarthritis of both hips  2021: Post-void dribbling  2021: Chronic left-sided low back pain with left-sided sciatica  2019: Family history of atherosclerosis  2018-10: DELL (obstructive sleep apnea)  2018-10: Class 2 severe obesity due to excess calories with serious   comorbidity and body mass index (BMI) of 38.0 to 38.9 in adult (H)  2017: Biceps tendinopathy, right  2017: Hot flashes  2016: Type 2 diabetes mellitus without complication (H)  Osteoarthrosis Of The Knee  Vitamin D Deficiency  Hyperlipidemia  Hearing Loss  Benign Essential Hypertension  Heartburn  Type 2 diabetes mellitus without complication, without long-term   current use of insulin (H)      Past Medical History:   Diagnosis Date    Arthritis     Diabetes (H)     Hypertension     Nicotine abuse 2015    Sleep apnea        Social History     Tobacco Use    Smoking status: Former     Current packs/day: 0.00     Average packs/day: 0.5 packs/day for 32.9 years (16.5 ttl pk-yrs)     Types: Cigarettes     Start date: 1983     Quit date: 2015     Years since quittin.7     Passive exposure: Past    Smokeless tobacco: Former     Quit date: 2015   Substance Use Topics    Alcohol use: Yes     Comment: a couple times a year       Review of Systems  As above in HPI otherwise negative.    Vitals:    24 1629   BP: 138/79   BP Location: Left arm   Patient Position: Sitting   Cuff Size: Adult Large   Pulse: 77   Resp: 18   Temp: 98.7  F (37.1  C)   TempSrc: Oral   SpO2: 98%       General: Patient is resting comfortably no acute distress is afebrile  HEENT: Head is normocephalic atraumatic   eyes are PERRL EOMI sclera anicteric   Skin: Without  rash non-diaphoretic  Musculoskeletal:  On the left foot left hallux there is surrounding erythema and warmth to touch consistent with a paronychia.  With direct palpation there is no drainage.    Physical Exam    At the end of the encounter, I discussed results, diagnosis, medications. Discussed red flags for immediate return to clinic/ER, as well as indications for follow up if no improvement. Patient understood and agreed to plan. Patient was stable for discharge.

## 2024-09-09 DIAGNOSIS — K21.9 GERD (GASTROESOPHAGEAL REFLUX DISEASE): ICD-10-CM

## 2024-09-09 DIAGNOSIS — E11.9 TYPE 2 DIABETES MELLITUS WITHOUT COMPLICATION, WITHOUT LONG-TERM CURRENT USE OF INSULIN (H): ICD-10-CM

## 2024-09-09 DIAGNOSIS — I10 ESSENTIAL HYPERTENSION, BENIGN: ICD-10-CM

## 2024-09-09 RX ORDER — AMLODIPINE BESYLATE 10 MG/1
10 TABLET ORAL DAILY
Qty: 90 TABLET | Refills: 2 | Status: SHIPPED | OUTPATIENT
Start: 2024-09-09

## 2024-09-09 NOTE — TELEPHONE ENCOUNTER
Clinic RN: Please contact patient because patient should have run out of this medication on MAY 2024. Confirm patient is taking this medication as prescribed. Document findings and route refill encounter to provider for approval or denial.

## 2024-09-13 NOTE — TELEPHONE ENCOUNTER
" -- test strips pended needed PCP approval due to gap in therapy     Incoming call from patient. With her and another women in the background yelling at writer. \"If you don't want to send my test strips then I won't check my blood sugars anymore!\"    Informed patient that we just needed an update as to how they are being used. \"I don't check my blood sugar everyday, you should know that by now!\"     Patient then hung-up.     Darya Bahena RN  [facility]     "

## 2024-09-15 ENCOUNTER — OFFICE VISIT (OUTPATIENT)
Dept: FAMILY MEDICINE | Facility: CLINIC | Age: 59
End: 2024-09-15
Payer: COMMERCIAL

## 2024-09-15 VITALS
OXYGEN SATURATION: 97 % | TEMPERATURE: 98.3 F | RESPIRATION RATE: 19 BRPM | HEART RATE: 80 BPM | BODY MASS INDEX: 37.93 KG/M2 | SYSTOLIC BLOOD PRESSURE: 143 MMHG | DIASTOLIC BLOOD PRESSURE: 69 MMHG | WEIGHT: 235 LBS

## 2024-09-15 DIAGNOSIS — Z71.1 FEARED COMPLAINT WITHOUT DIAGNOSIS: Primary | ICD-10-CM

## 2024-09-15 PROCEDURE — 99213 OFFICE O/P EST LOW 20 MIN: CPT | Performed by: PHYSICIAN ASSISTANT

## 2024-09-16 ENCOUNTER — ANESTHESIA EVENT (OUTPATIENT)
Dept: SURGERY | Facility: AMBULATORY SURGERY CENTER | Age: 59
End: 2024-09-16
Payer: COMMERCIAL

## 2024-09-17 ENCOUNTER — ANESTHESIA (OUTPATIENT)
Dept: SURGERY | Facility: AMBULATORY SURGERY CENTER | Age: 59
End: 2024-09-17
Payer: COMMERCIAL

## 2024-09-17 ENCOUNTER — HOSPITAL ENCOUNTER (OUTPATIENT)
Facility: AMBULATORY SURGERY CENTER | Age: 59
Discharge: HOME | End: 2024-09-17
Attending: COLON & RECTAL SURGERY
Payer: COMMERCIAL

## 2024-09-17 VITALS
SYSTOLIC BLOOD PRESSURE: 169 MMHG | OXYGEN SATURATION: 96 % | DIASTOLIC BLOOD PRESSURE: 81 MMHG | WEIGHT: 234 LBS | HEIGHT: 66 IN | RESPIRATION RATE: 14 BRPM | BODY MASS INDEX: 37.61 KG/M2 | TEMPERATURE: 97.1 F

## 2024-09-17 LAB — COLONOSCOPY: NORMAL

## 2024-09-17 RX ORDER — PROPOFOL 10 MG/ML
INJECTION, EMULSION INTRAVENOUS CONTINUOUS PRN
Status: DISCONTINUED | OUTPATIENT
Start: 2024-09-17 | End: 2024-09-17

## 2024-09-17 RX ORDER — ONDANSETRON 2 MG/ML
INJECTION INTRAMUSCULAR; INTRAVENOUS PRN
Status: DISCONTINUED | OUTPATIENT
Start: 2024-09-17 | End: 2024-09-17

## 2024-09-17 RX ORDER — OXYCODONE HYDROCHLORIDE 10 MG/1
10 TABLET ORAL
Status: DISCONTINUED | OUTPATIENT
Start: 2024-09-17 | End: 2024-09-18 | Stop reason: HOSPADM

## 2024-09-17 RX ORDER — DEXAMETHASONE SODIUM PHOSPHATE 4 MG/ML
4 INJECTION, SOLUTION INTRA-ARTICULAR; INTRALESIONAL; INTRAMUSCULAR; INTRAVENOUS; SOFT TISSUE
Status: DISCONTINUED | OUTPATIENT
Start: 2024-09-17 | End: 2024-09-18 | Stop reason: HOSPADM

## 2024-09-17 RX ORDER — NALOXONE HYDROCHLORIDE 0.4 MG/ML
0.1 INJECTION, SOLUTION INTRAMUSCULAR; INTRAVENOUS; SUBCUTANEOUS
Status: DISCONTINUED | OUTPATIENT
Start: 2024-09-17 | End: 2024-09-18 | Stop reason: HOSPADM

## 2024-09-17 RX ORDER — PROPOFOL 10 MG/ML
INJECTION, EMULSION INTRAVENOUS PRN
Status: DISCONTINUED | OUTPATIENT
Start: 2024-09-17 | End: 2024-09-17

## 2024-09-17 RX ORDER — ONDANSETRON 4 MG/1
4 TABLET, ORALLY DISINTEGRATING ORAL EVERY 30 MIN PRN
Status: DISCONTINUED | OUTPATIENT
Start: 2024-09-17 | End: 2024-09-18 | Stop reason: HOSPADM

## 2024-09-17 RX ORDER — SODIUM CHLORIDE, SODIUM LACTATE, POTASSIUM CHLORIDE, CALCIUM CHLORIDE 600; 310; 30; 20 MG/100ML; MG/100ML; MG/100ML; MG/100ML
INJECTION, SOLUTION INTRAVENOUS CONTINUOUS
Status: DISCONTINUED | OUTPATIENT
Start: 2024-09-17 | End: 2024-09-18 | Stop reason: HOSPADM

## 2024-09-17 RX ORDER — OXYCODONE HYDROCHLORIDE 5 MG/1
5 TABLET ORAL
Status: DISCONTINUED | OUTPATIENT
Start: 2024-09-17 | End: 2024-09-18 | Stop reason: HOSPADM

## 2024-09-17 RX ORDER — LIDOCAINE HYDROCHLORIDE 20 MG/ML
INJECTION, SOLUTION INFILTRATION; PERINEURAL PRN
Status: DISCONTINUED | OUTPATIENT
Start: 2024-09-17 | End: 2024-09-17

## 2024-09-17 RX ORDER — ONDANSETRON 2 MG/ML
4 INJECTION INTRAMUSCULAR; INTRAVENOUS EVERY 30 MIN PRN
Status: DISCONTINUED | OUTPATIENT
Start: 2024-09-17 | End: 2024-09-18 | Stop reason: HOSPADM

## 2024-09-17 RX ORDER — LIDOCAINE 40 MG/G
CREAM TOPICAL
Status: DISCONTINUED | OUTPATIENT
Start: 2024-09-17 | End: 2024-09-18 | Stop reason: HOSPADM

## 2024-09-17 RX ADMIN — PROPOFOL 30 MG: 10 INJECTION, EMULSION INTRAVENOUS at 07:49

## 2024-09-17 RX ADMIN — LIDOCAINE HYDROCHLORIDE 2.5 ML: 20 INJECTION, SOLUTION INFILTRATION; PERINEURAL at 07:39

## 2024-09-17 RX ADMIN — PROPOFOL 180 MCG/KG/MIN: 10 INJECTION, EMULSION INTRAVENOUS at 07:39

## 2024-09-17 RX ADMIN — ONDANSETRON 4 MG: 2 INJECTION INTRAMUSCULAR; INTRAVENOUS at 07:40

## 2024-09-17 RX ADMIN — SODIUM CHLORIDE, SODIUM LACTATE, POTASSIUM CHLORIDE, CALCIUM CHLORIDE: 600; 310; 30; 20 INJECTION, SOLUTION INTRAVENOUS at 06:54

## 2024-09-17 RX ADMIN — PROPOFOL 30 MG: 10 INJECTION, EMULSION INTRAVENOUS at 07:58

## 2024-09-17 RX ADMIN — PROPOFOL 30 MG: 10 INJECTION, EMULSION INTRAVENOUS at 07:39

## 2024-09-17 NOTE — ANESTHESIA POSTPROCEDURE EVALUATION
Patient: Zaina BRUNER Esboldt    Procedure: Procedure(s):  COLONOSCOPY       Anesthesia Type:  MAC    Note:  Disposition: Outpatient   Postop Pain Control: Uneventful            Sign Out: Well controlled pain   PONV: No   Neuro/Psych: Uneventful            Sign Out: Acceptable/Baseline neuro status   Airway/Respiratory: Uneventful            Sign Out: Acceptable/Baseline resp. status   CV/Hemodynamics: Uneventful            Sign Out: Acceptable CV status; No obvious hypovolemia; No obvious fluid overload   Other NRE:    DID A NON-ROUTINE EVENT OCCUR?        Last vitals:  Vitals Value Taken Time   /63 09/17/24 0930   Temp 97.1  F (36.2  C) 09/17/24 0806   Pulse 65 09/17/24 0936   Resp 14 09/17/24 0806   SpO2 90 % 09/17/24 0936   Vitals shown include unfiled device data.    Electronically Signed By: Fuad Baca MD  September 17, 2024  10:51 AM

## 2024-09-17 NOTE — H&P
[facility]   H&P  History of Present Illness:     Here for screening colonoscop    Past Medical History:   Diagnosis Date    Arthritis     Diabetes (H)     Hypertension     Nicotine abuse 2015    Sleep apnea        Past Surgical History:   Procedure Laterality Date    COLONOSCOPY         Family History   Problem Relation Age of Onset    Arthritis Mother     Heart Disease Mother     Diabetes Mother     Hyperlipidemia Mother     Hypertension Mother     Nephrolithiasis Mother     Osteoporosis Mother     Heart Disease Father         Acute Anterolateral Myocardial Infarction    Diabetes Father     Hyperlipidemia Father     Coronary Artery Disease Father     Diabetes Maternal Grandmother     Colon Cancer Paternal Grandmother 80        Adenocarcinoma of the large intestine    Coronary Artery Disease Paternal Grandfather     Diabetes Brother     Hypertension Brother     Coronary Artery Disease Brother     Sleep Apnea Brother     No Known Problems Brother     Diabetes Sister     Cerebrovascular Disease Sister     Cerebrovascular Disease Sister     No Known Problems Sister     Breast Cancer No family hx of     Malignant Hyperthermia No family hx of     Anesthesia Reaction No family hx of        Social History     Socioeconomic History    Marital status: Single     Spouse name: Not on file    Number of children: 0    Years of education: Not on file    Highest education level: Not on file   Occupational History    Occupation:    Tobacco Use    Smoking status: Former     Current packs/day: 0.00     Average packs/day: 0.5 packs/day for 32.9 years (16.5 ttl pk-yrs)     Types: Cigarettes     Start date: 1983     Quit date: 2015     Years since quittin.8     Passive exposure: Past    Smokeless tobacco: Former     Quit date: 2015   Vaping Use    Vaping status: Never Used   Substance and Sexual Activity    Alcohol use: Yes     Comment: a couple times a year    Drug use: No     Sexual activity: Not Currently     Partners: Male     Birth control/protection: Post-menopausal   Other Topics Concern    Parent/sibling w/ CABG, MI or angioplasty before 65F 55M? Yes     Comment: brother did  at age 50   Social History Narrative    [place] [facility] [place] [facility]  .       Social Determinants of Health     Financial Resource Strain: Low Risk  (2024)    Financial Resource Strain     Within the past 12 months, have you or your family members you live with been unable to get utilities (heat, electricity) when it was really needed?: No   Food Insecurity: Low Risk  (2024)    Food Insecurity     Within the past 12 months, did you worry that your food would run out before you got money to buy more?: No     Within the past 12 months, did the food you bought just not last and you didn t have money to get more?: Patient declined   Transportation Needs: Low Risk  (2024)    Transportation Needs     Within the past 12 months, has lack of transportation kept you from medical appointments, getting your medicines, non-medical meetings or appointments, work, or from getting things that you need?: No   Physical Activity: Not on file   Stress: Not on file   Social Connections: Not on file   Interpersonal Safety: Low Risk  (2024)    Interpersonal Safety     Do you feel physically and emotionally safe where you currently live?: Yes     Within the past 12 months, have you been hit, slapped, kicked or otherwise physically hurt by someone?: No     Within the past 12 months, have you been humiliated or emotionally abused in other ways by your partner or ex-partner?: No   Housing Stability: Low Risk  (2024)    Housing Stability     Do you have housing? : Yes     Are you worried about losing your housing?: No         Review of Systems: A full 10 point review of systems was taken and is negative aside from what is noted above in the HPI.    Physical Exam:  /62   Temp 97  F (36.1  C)  "(Temporal)   Resp 16   Ht 1.676 m (5' 6\")   Wt 106.1 kg (234 lb)   SpO2 95%   BMI 37.77 kg/m      General: NAD, alert, cooperative  Head: normocephalic, without abnormality / atraumatic  Respiratory: non-labored breathing, CTAB  CV: RRR  Abdomen: soft, non-tender, non-distended.  No guarding or rebound   Skin: no rashes or lesions  Musculoskeletal: moves all four extremities equally; no calf edema or tenderness  Psychological: alert and oriented, answers questions appropriately  Neurological: cranial nerves grossly intact    Assessment/Plan:   Or for colonoscopy with alexy Hines MD  [facility]  [phone]    "

## 2024-09-17 NOTE — DISCHARGE INSTRUCTIONS
If you have any questions or concerns please call Dr Hines's office at [phone]  Call if you have ongoing or worsening abdominal pain  Severe nausea and vomiting  Fevers  NO driving or alcohol the rest of the day due to sedation  Your repeat colonoscopy should be in 10 years      If you have any questions or concerns regarding your procedure, please contact Dr. Hines, her office number is [phone].   [facility]   Adult Discharge Orders & Instructions     For 24 hours after surgery    Get plenty of rest.  A responsible adult must stay with you for at least 24 hours after you leave the hospital.   Do not drive or use heavy equipment.  If you have weakness or tingling, don't drive or use heavy equipment until this feeling goes away.  Do not drink alcohol.  Avoid strenuous or risky activities.  Ask for help when climbing stairs.   You may feel lightheaded.  IF so, sit for a few minutes before standing.  Have someone help you get up.   If you have nausea (feel sick to your stomach): Drink only clear liquids such as apple juice, ginger ale, broth or 7-Up.  Rest may also help.  Be sure to drink enough fluids.  Move to a regular diet as you feel able.  You may have a slight fever. Call the doctor if your fever is over 100 F (37.7 C) (taken under the tongue) or lasts longer than 24 hours.  You may have a dry mouth, a sore throat, muscle aches or trouble sleeping.  These should go away after 24 hours.  Do not make important or legal decisions.   Call your doctor for any of the followin.  Signs of infection (fever, growing tenderness at the surgery site, a large amount of drainage or bleeding, severe pain, foul-smelling drainage, redness, swelling).    2. It has been over 8 to 10 hours since surgery and you are still not able to urinate (pass water).    3.  Headache for over 24 hours.   Discharge Instructions:    Take you medications as directed and follow up with you primary provider as  scheduled.   You should expect to pass air from your rectum after the procedure.   Follow these care guidelines during your recovery for the next 24 hours.   If you have any questions or concerns please contact the provider that performed your procedure.     You were given medicine for sedation:  You have been given medicines during your procedure that might make you sleepy and weak. To prevent problems:    *Rest for the rest of the day after you are home. You should be back to your normal activity tomorrow.  *For the next 24 hours:   -Do not drink alcoholic beverages.   -Do not make any important decisions or sign any legal forms.   -Do not work around machinery or power equipment.     The medicines used for sedation may make you feel nauseated.   *Start with clear liquids, such as tea, jell-o, broth and ginger ale. As you feel better you may add soft foods such as pudding and ice cream.   *When you no longer feel nauseated, you may try your normal diet.     You should be back to eating your normal after 24 hours.     Call if you have any of these problems:  *Fever of 101 degree F or 38 degrees C  *Bleeding from the rectum  *Black stool or blood in your bowel movements  *Nausea with vomiting that does not ease after a few hours.  *Abdominal pain or bloating  *Fainting      .col

## 2024-09-17 NOTE — ANESTHESIA PREPROCEDURE EVALUATION
Anesthesia Pre-Procedure Evaluation    Patient: Zaina Shaikh   MRN: [id number] : 1965        Procedure : Procedure(s):  COLONOSCOPY          Past Medical History:   Diagnosis Date     Arthritis      Diabetes (H)      Hypertension      Nicotine abuse 2015     Sleep apnea       Past Surgical History:   Procedure Laterality Date     COLONOSCOPY        Allergies   Allergen Reactions     Lisinopril Cough      Social History     Tobacco Use     Smoking status: Former     Current packs/day: 0.00     Average packs/day: 0.5 packs/day for 32.9 years (16.5 ttl pk-yrs)     Types: Cigarettes     Start date: 1983     Quit date: 2015     Years since quittin.8     Passive exposure: Past     Smokeless tobacco: Former     Quit date: 2015   Substance Use Topics     Alcohol use: Yes     Comment: a couple times a year      Wt Readings from Last 1 Encounters:   24 106.1 kg (234 lb)        Anesthesia Evaluation            ROS/MED HX  ENT/Pulmonary:     (+) sleep apnea, moderate,                                       Neurologic:  - neg neurologic ROS     Cardiovascular:     (+)  hypertension- -   -  - -                                      METS/Exercise Tolerance:     Hematologic:  - neg hematologic  ROS     Musculoskeletal:  - neg musculoskeletal ROS     GI/Hepatic:  - neg GI/hepatic ROS     Renal/Genitourinary:  - neg Renal ROS     Endo:  - neg endo ROS   (+)  type II DM,             Obesity,       Psychiatric/Substance Use:  - neg psychiatric ROS     Infectious Disease:  - neg infectious disease ROS     Malignancy:  - neg malignancy ROS     Other:  - neg other ROS          Physical Exam    Airway  airway exam normal      Mallampati: II       Respiratory Devices and Support         Dental  no notable dental history         Cardiovascular   cardiovascular exam normal       Rhythm and rate: regular and normal     Pulmonary   pulmonary exam normal        breath sounds clear to auscultation  "        OUTSIDE LABS:  CBC:   Lab Results   Component Value Date    WBC 6.2 09/03/2020    HGB 13.1 09/03/2020    HCT 40.0 09/03/2020     09/03/2020     BMP:   Lab Results   Component Value Date     01/19/2024     07/28/2023    POTASSIUM 4.2 01/19/2024    POTASSIUM 3.8 07/28/2023    CHLORIDE 103 01/19/2024    CHLORIDE 104 07/28/2023    CO2 28 01/19/2024    CO2 25 07/28/2023    BUN 13.3 01/19/2024    BUN 12.5 07/28/2023    CR 0.71 01/19/2024    CR 0.68 07/28/2023     (H) 01/19/2024     (H) 07/28/2023     COAGS: No results found for: \"PTT\", \"INR\", \"FIBR\"  POC: No results found for: \"BGM\", \"HCG\", \"HCGS\"  HEPATIC:   Lab Results   Component Value Date    ALBUMIN 4.2 01/19/2024    PROTTOTAL 7.2 01/19/2024    ALT 18 01/19/2024    AST 19 01/19/2024    ALKPHOS 78 01/19/2024    BILITOTAL 0.5 01/19/2024     OTHER:   Lab Results   Component Value Date    A1C 6.7 (H) 05/24/2024    ROGER 9.6 01/19/2024    TSH 1.73 09/10/2021       Anesthesia Plan    ASA Status:  3       Anesthesia Type: MAC.   Induction: Intravenous, Propofol.   Maintenance: TIVA.        Consents    Anesthesia Plan(s) and associated risks, benefits, and realistic alternatives discussed. Questions answered and patient/representative(s) expressed understanding.     - Discussed:     - Discussed with:  Patient      - Extended Intubation/Ventilatory Support Discussed: No.      - Patient is DNR/DNI Status: No     Use of blood products discussed: No .     Postoperative Care       PONV prophylaxis: Ondansetron (or other 5HT-3), Dexamethasone or Solumedrol     Comments:    Other Comments: Propofol infusion           Fuad Baca MD    I have reviewed the pertinent notes and labs in the chart from the past 30 days and (re)examined the patient.  Any updates or changes from those notes are reflected in this note.             # Drug Induced Platelet Defect: home medication list includes an antiplatelet medication  # DMII: A1C = N/A within " "past 6 months  # Obesity: Estimated body mass index is 37.77 kg/m  as calculated from the following:    Height as of this encounter: 1.676 m (5' 6\").    Weight as of this encounter: 106.1 kg (234 lb).      "

## 2024-09-17 NOTE — ANESTHESIA CARE TRANSFER NOTE
Patient: Zaina Shaikh    Procedure: Procedure(s):  COLONOSCOPY       Diagnosis: Screen for colon cancer [Z12.11]  Diagnosis Additional Information: No value filed.    Anesthesia Type:   MAC     Note:    Oropharynx: oropharynx clear of all foreign objects and spontaneously breathing  Level of Consciousness: awake  Oxygen Supplementation: room air    Independent Airway: airway patency satisfactory and stable  Dentition: dentition unchanged  Vital Signs Stable: post-procedure vital signs reviewed and stable  Report to RN Given: handoff report given  Patient transferred to: Phase II    Handoff Report: Identifed the Patient, Identified the Reponsible Provider, Reviewed the pertinent medical history, Discussed the surgical course, Reviewed Intra-OP anesthesia mangement and issues during anesthesia, Set expectations for post-procedure period and Allowed opportunity for questions and acknowledgement of understanding  Vitals:  Vitals Value Taken Time   /77 09/17/24 0815   Temp 97.1  F (36.2  C) 09/17/24 0806   Pulse 57 09/17/24 0817   Resp 14 09/17/24 0806   SpO2 97 % 09/17/24 0817   Vitals shown include unfiled device data.    Electronically Signed By: MOUNIKA Cooper CRNA  September 17, 2024  8:20 AM

## 2024-09-23 DIAGNOSIS — I10 ESSENTIAL HYPERTENSION, BENIGN: ICD-10-CM

## 2024-09-24 RX ORDER — AMLODIPINE BESYLATE 10 MG/1
10 TABLET ORAL DAILY
Qty: 90 TABLET | Refills: 2 | OUTPATIENT
Start: 2024-09-24

## 2024-09-28 ENCOUNTER — HEALTH MAINTENANCE LETTER (OUTPATIENT)
Age: 59
End: 2024-09-28

## 2024-10-02 ENCOUNTER — ANCILLARY PROCEDURE (OUTPATIENT)
Dept: MAMMOGRAPHY | Facility: HOSPITAL | Age: 59
End: 2024-10-02
Payer: COMMERCIAL

## 2024-10-02 DIAGNOSIS — Z12.31 VISIT FOR SCREENING MAMMOGRAM: ICD-10-CM

## 2024-10-02 PROCEDURE — 77063 BREAST TOMOSYNTHESIS BI: CPT

## 2024-10-11 ENCOUNTER — LAB (OUTPATIENT)
Dept: LAB | Facility: CLINIC | Age: 59
End: 2024-10-11
Payer: COMMERCIAL

## 2024-10-11 DIAGNOSIS — E11.9 TYPE 2 DIABETES MELLITUS WITHOUT COMPLICATION, WITHOUT LONG-TERM CURRENT USE OF INSULIN (H): ICD-10-CM

## 2024-10-11 DIAGNOSIS — E11.9 TYPE 2 DIABETES MELLITUS WITHOUT COMPLICATION, WITHOUT LONG-TERM CURRENT USE OF INSULIN (H): Primary | ICD-10-CM

## 2024-10-11 LAB
ALBUMIN SERPL BCG-MCNC: 4 G/DL (ref 3.5–5.2)
ALP SERPL-CCNC: 79 U/L (ref 40–150)
ALT SERPL W P-5'-P-CCNC: 19 U/L (ref 0–50)
ANION GAP SERPL CALCULATED.3IONS-SCNC: 12 MMOL/L (ref 7–15)
AST SERPL W P-5'-P-CCNC: 19 U/L (ref 0–45)
BILIRUB SERPL-MCNC: 0.4 MG/DL
BUN SERPL-MCNC: 15.4 MG/DL (ref 8–23)
CALCIUM SERPL-MCNC: 9.3 MG/DL (ref 8.8–10.4)
CHLORIDE SERPL-SCNC: 103 MMOL/L (ref 98–107)
CREAT SERPL-MCNC: 0.73 MG/DL (ref 0.51–0.95)
EGFRCR SERPLBLD CKD-EPI 2021: >90 ML/MIN/1.73M2
EST. AVERAGE GLUCOSE BLD GHB EST-MCNC: 148 MG/DL
GLUCOSE SERPL-MCNC: 138 MG/DL (ref 70–99)
HBA1C MFR BLD: 6.8 % (ref 0–5.6)
HCO3 SERPL-SCNC: 26 MMOL/L (ref 22–29)
POTASSIUM SERPL-SCNC: 3.8 MMOL/L (ref 3.4–5.3)
PROT SERPL-MCNC: 7 G/DL (ref 6.4–8.3)
SODIUM SERPL-SCNC: 141 MMOL/L (ref 135–145)

## 2024-10-11 PROCEDURE — 36415 COLL VENOUS BLD VENIPUNCTURE: CPT

## 2024-10-11 PROCEDURE — 83036 HEMOGLOBIN GLYCOSYLATED A1C: CPT

## 2024-10-11 PROCEDURE — 80053 COMPREHEN METABOLIC PANEL: CPT

## 2025-01-18 ENCOUNTER — HEALTH MAINTENANCE LETTER (OUTPATIENT)
Age: 60
End: 2025-01-18

## 2025-03-25 DIAGNOSIS — I10 ESSENTIAL HYPERTENSION, BENIGN: ICD-10-CM

## 2025-03-25 DIAGNOSIS — E78.2 MIXED HYPERLIPIDEMIA: ICD-10-CM

## 2025-03-25 DIAGNOSIS — E11.9 TYPE 2 DIABETES MELLITUS WITHOUT COMPLICATION, WITHOUT LONG-TERM CURRENT USE OF INSULIN (H): ICD-10-CM

## 2025-03-27 RX ORDER — ATORVASTATIN CALCIUM 80 MG/1
80 TABLET, FILM COATED ORAL DAILY
Qty: 90 TABLET | Refills: 4 | Status: SHIPPED | OUTPATIENT
Start: 2025-03-27

## 2025-03-27 RX ORDER — LOSARTAN POTASSIUM AND HYDROCHLOROTHIAZIDE 25; 100 MG/1; MG/1
1 TABLET ORAL DAILY
Qty: 90 TABLET | Refills: 4 | Status: SHIPPED | OUTPATIENT
Start: 2025-03-27

## 2025-03-28 SDOH — HEALTH STABILITY: PHYSICAL HEALTH: ON AVERAGE, HOW MANY DAYS PER WEEK DO YOU ENGAGE IN MODERATE TO STRENUOUS EXERCISE (LIKE A BRISK WALK)?: 3 DAYS

## 2025-03-28 SDOH — HEALTH STABILITY: PHYSICAL HEALTH: ON AVERAGE, HOW MANY MINUTES DO YOU ENGAGE IN EXERCISE AT THIS LEVEL?: 10 MIN

## 2025-03-28 ASSESSMENT — SOCIAL DETERMINANTS OF HEALTH (SDOH): HOW OFTEN DO YOU GET TOGETHER WITH FRIENDS OR RELATIVES?: MORE THAN THREE TIMES A WEEK

## 2025-04-02 ENCOUNTER — OFFICE VISIT (OUTPATIENT)
Dept: FAMILY MEDICINE | Facility: CLINIC | Age: 60
End: 2025-04-02
Payer: COMMERCIAL

## 2025-04-02 VITALS
HEART RATE: 72 BPM | TEMPERATURE: 97.3 F | DIASTOLIC BLOOD PRESSURE: 78 MMHG | BODY MASS INDEX: 39.65 KG/M2 | HEIGHT: 65 IN | OXYGEN SATURATION: 99 % | RESPIRATION RATE: 18 BRPM | SYSTOLIC BLOOD PRESSURE: 145 MMHG | WEIGHT: 238 LBS

## 2025-04-02 DIAGNOSIS — E78.2 MIXED HYPERLIPIDEMIA: ICD-10-CM

## 2025-04-02 DIAGNOSIS — E66.812 CLASS 2 SEVERE OBESITY DUE TO EXCESS CALORIES WITH SERIOUS COMORBIDITY AND BODY MASS INDEX (BMI) OF 39.0 TO 39.9 IN ADULT (H): ICD-10-CM

## 2025-04-02 DIAGNOSIS — R12 HEARTBURN: ICD-10-CM

## 2025-04-02 DIAGNOSIS — I10 ESSENTIAL HYPERTENSION, BENIGN: ICD-10-CM

## 2025-04-02 DIAGNOSIS — E66.01 CLASS 2 SEVERE OBESITY DUE TO EXCESS CALORIES WITH SERIOUS COMORBIDITY AND BODY MASS INDEX (BMI) OF 39.0 TO 39.9 IN ADULT (H): ICD-10-CM

## 2025-04-02 DIAGNOSIS — E11.9 TYPE 2 DIABETES MELLITUS WITHOUT COMPLICATION, WITHOUT LONG-TERM CURRENT USE OF INSULIN (H): Primary | ICD-10-CM

## 2025-04-02 DIAGNOSIS — Z00.00 ROUTINE GENERAL MEDICAL EXAMINATION AT A HEALTH CARE FACILITY: ICD-10-CM

## 2025-04-02 PROBLEM — Z71.89 ADVANCED DIRECTIVES, COUNSELING/DISCUSSION: Status: ACTIVE | Noted: 2022-09-26

## 2025-04-02 LAB
ALBUMIN SERPL BCG-MCNC: 4.2 G/DL (ref 3.5–5.2)
ALP SERPL-CCNC: 77 U/L (ref 40–150)
ALT SERPL W P-5'-P-CCNC: 24 U/L (ref 0–50)
ANION GAP SERPL CALCULATED.3IONS-SCNC: 13 MMOL/L (ref 7–15)
AST SERPL W P-5'-P-CCNC: 25 U/L (ref 0–45)
BILIRUB SERPL-MCNC: 0.6 MG/DL
BUN SERPL-MCNC: 14.8 MG/DL (ref 8–23)
CALCIUM SERPL-MCNC: 9.7 MG/DL (ref 8.8–10.4)
CHLORIDE SERPL-SCNC: 103 MMOL/L (ref 98–107)
CHOLEST SERPL-MCNC: 176 MG/DL
CREAT SERPL-MCNC: 0.59 MG/DL (ref 0.51–0.95)
CREAT UR-MCNC: 48.3 MG/DL
EGFRCR SERPLBLD CKD-EPI 2021: >90 ML/MIN/1.73M2
ERYTHROCYTE [DISTWIDTH] IN BLOOD BY AUTOMATED COUNT: 12.9 % (ref 10–15)
EST. AVERAGE GLUCOSE BLD GHB EST-MCNC: 148 MG/DL
FASTING STATUS PATIENT QL REPORTED: YES
FASTING STATUS PATIENT QL REPORTED: YES
GLUCOSE SERPL-MCNC: 115 MG/DL (ref 70–99)
HBA1C MFR BLD: 6.8 % (ref 0–5.6)
HCO3 SERPL-SCNC: 25 MMOL/L (ref 22–29)
HCT VFR BLD AUTO: 42.7 % (ref 35–47)
HDLC SERPL-MCNC: 41 MG/DL
HGB BLD-MCNC: 14.2 G/DL (ref 11.7–15.7)
LDLC SERPL CALC-MCNC: 105 MG/DL
MCH RBC QN AUTO: 29.7 PG (ref 26.5–33)
MCHC RBC AUTO-ENTMCNC: 33.3 G/DL (ref 31.5–36.5)
MCV RBC AUTO: 89 FL (ref 78–100)
MICROALBUMIN UR-MCNC: <12 MG/L
MICROALBUMIN/CREAT UR: NORMAL MG/G{CREAT}
NONHDLC SERPL-MCNC: 135 MG/DL
PLATELET # BLD AUTO: 260 10E3/UL (ref 150–450)
POTASSIUM SERPL-SCNC: 3.8 MMOL/L (ref 3.4–5.3)
PROT SERPL-MCNC: 7.2 G/DL (ref 6.4–8.3)
RBC # BLD AUTO: 4.78 10E6/UL (ref 3.8–5.2)
SODIUM SERPL-SCNC: 141 MMOL/L (ref 135–145)
TRIGL SERPL-MCNC: 149 MG/DL
TSH SERPL DL<=0.005 MIU/L-ACNC: 1.65 UIU/ML (ref 0.3–4.2)
VIT B12 SERPL-MCNC: 417 PG/ML (ref 232–1245)
WBC # BLD AUTO: 6.8 10E3/UL (ref 4–11)

## 2025-04-02 PROCEDURE — 80061 LIPID PANEL: CPT | Performed by: FAMILY MEDICINE

## 2025-04-02 PROCEDURE — 84443 ASSAY THYROID STIM HORMONE: CPT | Performed by: FAMILY MEDICINE

## 2025-04-02 PROCEDURE — 83036 HEMOGLOBIN GLYCOSYLATED A1C: CPT | Performed by: FAMILY MEDICINE

## 2025-04-02 PROCEDURE — 82570 ASSAY OF URINE CREATININE: CPT | Performed by: FAMILY MEDICINE

## 2025-04-02 PROCEDURE — 82043 UR ALBUMIN QUANTITATIVE: CPT | Performed by: FAMILY MEDICINE

## 2025-04-02 PROCEDURE — 80053 COMPREHEN METABOLIC PANEL: CPT | Performed by: FAMILY MEDICINE

## 2025-04-02 PROCEDURE — 85027 COMPLETE CBC AUTOMATED: CPT | Performed by: FAMILY MEDICINE

## 2025-04-02 PROCEDURE — 36415 COLL VENOUS BLD VENIPUNCTURE: CPT | Performed by: FAMILY MEDICINE

## 2025-04-02 PROCEDURE — 82607 VITAMIN B-12: CPT | Performed by: FAMILY MEDICINE

## 2025-04-02 NOTE — PATIENT INSTRUCTIONS
Schedule eye exam     Can try omeprazole heartburn med every other day to see if this helps.      Patient Education   Preventive Care Advice   This is general advice given by our system to help you stay healthy. However, your care team may have specific advice just for you. Please talk to your care team about your preventive care needs.  Nutrition  Eat 5 or more servings of fruits and vegetables each day.  Try wheat bread, brown rice and whole grain pasta (instead of white bread, rice, and pasta).  Get enough calcium and vitamin D. Check the label on foods and aim for 100% of the RDA (recommended daily allowance).  Lifestyle  Exercise at least 150 minutes each week  (30 minutes a day, 5 days a week).  Do muscle strengthening activities 2 days a week. These help control your weight and prevent disease.  No smoking.  Wear sunscreen to prevent skin cancer.  Have a dental exam and cleaning every 6 months.  Yearly exams  See your health care team every year to talk about:  Any changes in your health.  Any medicines your care team has prescribed.  Preventive care, family planning, and ways to prevent chronic diseases.  Shots (vaccines)   HPV shots (up to age 26), if you've never had them before.  Hepatitis B shots (up to age 59), if you've never had them before.  COVID-19 shot: Get this shot when it's due.  Flu shot: Get a flu shot every year.  Tetanus shot: Get a tetanus shot every 10 years.  Pneumococcal, hepatitis A, and RSV shots: Ask your care team if you need these based on your risk.  Shingles shot (for age 50 and up)  General health tests  Diabetes screening:  Starting at age 35, Get screened for diabetes at least every 3 years.  If you are younger than age 35, ask your care team if you should be screened for diabetes.  Cholesterol test: At age 39, start having a cholesterol test every 5 years, or more often if advised.  Bone density scan (DEXA): At age 50, ask your care team if you should have this scan for  osteoporosis (brittle bones).  Hepatitis C: Get tested at least once in your life.  STIs (sexually transmitted infections)  Before age 24: Ask your care team if you should be screened for STIs.  After age 24: Get screened for STIs if you're at risk. You are at risk for STIs (including HIV) if:  You are sexually active with more than one person.  You don't use condoms every time.  You or a partner was diagnosed with a sexually transmitted infection.  If you are at risk for HIV, ask about PrEP medicine to prevent HIV.  Get tested for HIV at least once in your life, whether you are at risk for HIV or not.  Cancer screening tests  Cervical cancer screening: If you have a cervix, begin getting regular cervical cancer screening tests starting at age 21.  Breast cancer scan (mammogram): If you've ever had breasts, begin having regular mammograms starting at age 40. This is a scan to check for breast cancer.  Colon cancer screening: It is important to start screening for colon cancer at age 45.  Have a colonoscopy test every 10 years (or more often if you're at risk) Or, ask your provider about stool tests like a FIT test every year or Cologuard test every 3 years.  To learn more about your testing options, visit:   .  For help making a decision, visit:   https://bit.ly/bw06407.  Prostate cancer screening test: If you have a prostate, ask your care team if a prostate cancer screening test (PSA) at age 55 is right for you.  Lung cancer screening: If you are a current or former smoker ages 50 to 80, ask your care team if ongoing lung cancer screenings are right for you.  For informational purposes only. Not to replace the advice of your health care provider. Copyright   2023 [employer]. All rights reserved. Clinically reviewed by the [facility]. [place] 056265 - REV 01/24.  Learning About Stress  What is stress?     Stress is your body's response to a hard situation. Your body can  have a physical, emotional, or mental response. Stress is a fact of life for most people, and it affects everyone differently. What causes stress for you may not be stressful for someone else.  A lot of things can cause stress. You may feel stress when you go on a job interview, take a test, or run a race. This kind of short-term stress is normal and even useful. It can help you if you need to work hard or react quickly. For example, stress can help you finish an important job on time.  Long-term stress is caused by ongoing stressful situations or events. Examples of long-term stress include long-term health problems, ongoing problems at work, or conflicts in your family. Long-term stress can harm your health.  How does stress affect your health?  When you are stressed, your body responds as though you are in danger. It makes hormones that speed up your heart, make you breathe faster, and give you a burst of energy. This is called the fight-or-flight stress response. If the stress is over quickly, your body goes back to normal and no harm is done.  But if stress happens too often or lasts too long, it can have bad effects. Long-term stress can make you more likely to get sick, and it can make symptoms of some diseases worse. If you tense up when you are stressed, you may develop neck, shoulder, or low back pain. Stress is linked to high blood pressure and heart disease.  Stress also harms your emotional health. It can make you mejias, tense, or depressed. Your relationships may suffer, and you may not do well at work or school.  What can you do to manage stress?  You can try these things to help manage stress:   Do something active. Exercise or activity can help reduce stress. Walking is a great way to get started. Even everyday activities such as housecleaning or yard work can help.  Try yoga or binu chi. These techniques combine exercise and meditation. You may need some training at first to learn them.  Do  "something you enjoy. For example, listen to music or go to a movie. Practice your hobby or do volunteer work.  Meditate. This can help you relax, because you are not worrying about what happened before or what may happen in the future.  Do guided imagery. Imagine yourself in any setting that helps you feel calm. You can use online videos, books, or a teacher to guide you.  Do breathing exercises. For example:  From a standing position, bend forward from the waist with your knees slightly bent. Let your arms dangle close to the floor.  Breathe in slowly and deeply as you return to a standing position. Roll up slowly and lift your head last.  Hold your breath for just a few seconds in the standing position.  Breathe out slowly and bend forward from the waist.  Let your feelings out. Talk, laugh, cry, and express anger when you need to. Talking with supportive friends or family, a counselor, or a tapan leader about your feelings is a healthy way to relieve stress. Avoid discussing your feelings with people who make you feel worse.  Write. It may help to write about things that are bothering you. This helps you find out how much stress you feel and what is causing it. When you know this, you can find better ways to cope.  What can you do to prevent stress?  You might try some of these things to help prevent stress:  Manage your time. This helps you find time to do the things you want and need to do.  Get enough sleep. Your body recovers from the stresses of the day while you are sleeping.  Get support. Your family, friends, and community can make a difference in how you experience stress.  Limit your news feed. Avoid or limit time on social media or news that may make you feel stressed.  Do something active. Exercise or activity can help reduce stress. Walking is a great way to get started.  Where can you learn more?  Go to https://www.healthwise.net/patiented  Enter N032 in the search box to learn more about \"Learning " "About Stress.\"  Current as of: October 24, 2024  Content Version: 14.4    4570-9428 [employer].   Care instructions adapted under license by your healthcare professional. If you have questions about a medical condition or this instruction, always ask your healthcare professional. [employer] disclaims any warranty or liability for your use of this information.       "

## 2025-04-02 NOTE — PROGRESS NOTES
Prior to immunization administration, verified patients identity using patient s name and date of birth. Please see Immunization Activity for additional information.     Screening Questionnaire for Adult Immunization    Are you sick today?   No   Do you have allergies to medications, food, a vaccine component or latex?   Yes   Have you ever had a serious reaction after receiving a vaccination?   No   Do you have a long-term health problem with heart, lung, kidney, or metabolic disease (e.g., diabetes), asthma, a blood disorder, no spleen, complement component deficiency, a cochlear implant, or a spinal fluid leak?  Are you on long-term aspirin therapy?   Yes   Do you have cancer, leukemia, HIV/AIDS, or any other immune system problem?   No   Do you have a parent, brother, or sister with an immune system problem?   No   In the past 3 months, have you taken medications that affect  your immune system, such as prednisone, other steroids, or anticancer drugs; drugs for the treatment of rheumatoid arthritis, Crohn s disease, or psoriasis; or have you had radiation treatments?   No   Have you had a seizure, or a brain or other nervous system problem?   No   During the past year, have you received a transfusion of blood or blood    products, or been given immune (gamma) globulin or antiviral drug?   No   For women: Are you pregnant or is there a chance you could become       pregnant during the next month?   No   Have you received any vaccinations in the past 4 weeks?   No     Immunization questionnaire was positive for at least one answer.  Notified provider.      Patient instructed to remain in clinic for 15 minutes afterwards, and to report any adverse reactions.     Screening performed by Amy Rossi MA on 4/2/2025 at 8:31 AM.

## 2025-04-02 NOTE — PROGRESS NOTES
Preventive Care Visit  [facility]  Madisyn Stacy MD, Family Medicine  Apr 2, 2025      Assessment & Plan     Type 2 diabetes mellitus without complication, without long-term current use of insulin (H)   Controlled.  Addressed smoking status and aspirin therapy.  Recommended annual eye exam and dental cares. Reviewed foot cares and foot exam.  Blood pressure and lipid management reviewed today.  Vaccines reviewed and updated.  Plan for glucose management includes ongoing focus on healthy diabetic diet and increased activity, continue off medications- consider sglt2 next.  Labs ordered as below including:     Hemoglobin A1C   Date Value Ref Range Status   04/02/2025 6.8 (H) 0.0 - 5.6 % Final     Comment:     Normal <5.7%   Prediabetes 5.7-6.4%    Diabetes 6.5% or higher     Note: Adopted from ADA consensus guidelines.   10/11/2024 6.8 (H) 0.0 - 5.6 % Final     Comment:     Normal <5.7%   Prediabetes 5.7-6.4%    Diabetes 6.5% or higher     Note: Adopted from ADA consensus guidelines.   05/24/2024 6.7 (H) 0.0 - 5.6 % Final     Comment:     Normal <5.7%   Prediabetes 5.7-6.4%    Diabetes 6.5% or higher     Note: Adopted from ADA consensus guidelines.    ,   Lab Results   Component Value Date     (H) 04/02/2025   ,   Creatinine   Date Value Ref Range Status   04/02/2025 0.59 0.51 - 0.95 mg/dL Final        - HEMOGLOBIN A1C  - Lipid panel reflex to direct LDL Non-fasting  - Albumin Random Urine Quantitative with Creat Ratio  - Comprehensive metabolic panel (BMP + Alb, Alk Phos, ALT, AST, Total. Bili, TP)  - Albumin Random Urine Quantitative with Creat Ratio  - HEMOGLOBIN A1C  - Lipid panel reflex to direct LDL Non-fasting  - Comprehensive metabolic panel (BMP + Alb, Alk Phos, ALT, AST, Total. Bili, TP)    Routine general medical examination at a health care facility  - Pneumococcal 20 Valent Conjugate (PCV20)  - REVIEW OF HEALTH MAINTENANCE PROTOCOL ORDERS  - TSH with free T4 reflex  -  "Vitamin B12  - CBC with platelets  - TSH with free T4 reflex  - Vitamin B12  - CBC with platelets    Class 2 severe obesity due to excess calories with serious comorbidity and body mass index (BMI) of 39.0 to 39.9 in adult (H)  Reviewed LSM- declining glp-1     Benign Essential Hypertension  BP Readings from Last 3 Encounters:   04/02/25 (!) 145/78   09/17/24 (!) 169/81   09/15/24 (!) 143/69        Uncontrolled today.  Plan for bloodpressure management includes ongoing focus on healthy DASH type diet and increased activity, encouraged to avoid tobacco products and limit alcohol use, stress reduction, continue amlodipine 10mg daily and losartan/jcii705/25mg daily.  Add spironolactone next.  Notes bp at home is always better. Will have pt bring in home bp cuff and check with RN bp visit.  Labwork and meds ordered and reviewed as below  Potassium   Date Value Ref Range Status   04/02/2025 3.8 3.4 - 5.3 mmol/L Final   04/27/2022 3.7 3.5 - 5.0 mmol/L Final      Creatinine   Date Value Ref Range Status   04/02/2025 0.59 0.51 - 0.95 mg/dL Final          Heartburn  Stable with ppi daily- consider taking every other day to see if symptoms still well controlled.      Mixed hyperlipidemia  On atorvastatin 80mg daily with increased lipids.  Will focus on diet and exercise again and will recheck at next visit.        Patient has been advised of split billing requirements and indicates understanding: Yes        BMI  Estimated body mass index is 39.18 kg/m  as calculated from the following:    Height as of this encounter: 1.66 m (5' 5.35\").    Weight as of this encounter: 108 kg (238 lb).   Weight management plan: Discussed healthy diet and exercise guidelines    Counseling  Appropriate preventive services were addressed with this patient via screening, questionnaire, or discussion as appropriate for fall prevention, nutrition, physical activity, Tobacco-use cessation, social engagement, weight loss and cognition.  Checklist " reviewing preventive services available has been given to the patient.  Reviewed patient's diet, addressing concerns and/or questions.   She is at risk for lack of exercise and has been provided with information to increase physical activity for the benefit of her well-being.   She is at risk for psychosocial distress and has been provided with information to reduce risk.           Peña Mahajan is a 59 year old, presenting for the following:  Physical        4/2/2025     8:27 AM   Additional Questions   Roomed by hser   Accompanied by self          HPI  Very stressed this year- boyfriend had amputation due to uncontrolled diabetes. New boss at work is very stressful for her.      Lost toenail after walking [place] for 9 hours.   Both feet are numb and tingling.    Diabetes- doesn't want to do ozempic    Fasting sugars 130-160s    Goes to [facility]- due this spring.      Bp at home 130/70s      Heartburn doing well on PPI- if she misses will get heartburn   Mild nausea with certain foods.      Sets up pill box.      Declines covid and flu shots this year     Joint pain- vitafusion for immune/bone/heart/eye     Fasting today     Advance Care Planning  Patient does not have a Health Care Directive: Discussed advance care planning with patient; information given to patient to review.      3/28/2025   General Health   How would you rate your overall physical health? (!) FAIR   Feel stress (tense, anxious, or unable to sleep) To some extent   (!) STRESS CONCERN      3/28/2025   Nutrition   Three or more servings of calcium each day? (!) NO   Diet: Carbohydrate counting   How many servings of fruit and vegetables per day? (!) 2-3   How many sweetened beverages each day? 0-1         3/28/2025   Exercise   Days per week of moderate/strenous exercise 3 days   Average minutes spent exercising at this level 10 min         3/28/2025   Social Factors   Frequency of gathering with friends or relatives More than three  times a week   Worry food won't last until get money to buy more Patient declined   Food not last or not have enough money for food? Patient declined   Do you have housing? (Housing is defined as stable permanent housing and does not include staying ouside in a car, in a tent, in an abandoned building, in an overnight shelter, or couch-surfing.) No   Are you worried about losing your housing? Patient declined   Lack of transportation? No   Unable to get utilities (heat,electricity)? No   Want help with housing or utility concern? No   (!) HOUSING CONCERN PRESENT      3/28/2025   Fall Risk   Fallen 2 or more times in the past year? No   Trouble with walking or balance? No          3/28/2025   Dental   Dentist two times every year? Yes           Today's PHQ-2 Score:       2025     8:23 AM   PHQ-2 (  [employer])   Q1: Little interest or pleasure in doing things 0   Q2: Feeling down, depressed or hopeless 0   PHQ-2 Score 0    Q1: Little interest or pleasure in doing things Not at all   Q2: Feeling down, depressed or hopeless Not at all   PHQ-2 Score 0       Patient-reported           3/28/2025   Substance Use   Alcohol more than 3/day or more than 7/wk No   Do you use any other substances recreationally? No     Social History     Tobacco Use    Smoking status: Former     Current packs/day: 0.00     Average packs/day: 0.5 packs/day for 32.9 years (16.5 ttl pk-yrs)     Types: Cigarettes     Start date: 1983     Quit date: 2015     Years since quittin.3     Passive exposure: Past    Smokeless tobacco: Former     Quit date: 2015   Vaping Use    Vaping status: Never Used   Substance Use Topics    Alcohol use: Yes     Comment: a couple times a year    Drug use: No           10/2/2024   LAST FHS-7 RESULTS   1st degree relative breast or ovarian cancer No   Any relative bilateral breast cancer No   Any male have breast cancer No   Any ONE woman have BOTH breast AND ovarian cancer No   Any woman with  "breast cancer before 50yrs No   2 or more relatives with breast AND/OR ovarian cancer No   2 or more relatives with breast AND/OR bowel cancer No        Mammogram Screening - Mammogram every 1-2 years updated in Health Maintenance based on mutual decision making        3/28/2025   STI Screening   New sexual partner(s) since last STI/HIV test? No     History of abnormal Pap smear: No - age 30- 64 PAP with HPV every 5 years recommended        Latest Ref Rng & Units 9/10/2021    10:47 AM 5/26/2016    12:53 PM   PAP / HPV   PAP  Negative for Intraepithelial Lesion or Malignancy (NILM)  Negative for squamous intraepithelial lesion or malignancy  Electronically signed by Johnna Gonsales CT (ASCP) on 6/3/2016 at 11:08 AM      HPV 16 DNA Negative Negative     HPV 18 DNA Negative Negative     Other HR HPV Negative Negative       ASCVD Risk   The 10-year ASCVD risk score (Geri RAO, et al., 2019) is: 12.8%    Values used to calculate the score:      Age: 59 years      Sex: Female      Is Non- : No      Diabetic: Yes      Tobacco smoker: No      Systolic Blood Pressure: 145 mmHg      Is BP treated: Yes      HDL Cholesterol: 41 mg/dL      Total Cholesterol: 176 mg/dL           Reviewed and updated as needed this visit by Provider   Tobacco  Allergies  Meds  Problems  Med Hx  Surg Hx  Fam Hx                     Objective    Exam  BP (!) 145/78   Pulse 72   Temp 97.3  F (36.3  C) (Temporal)   Resp 18   Ht 1.66 m (5' 5.35\")   Wt 108 kg (238 lb)   LMP  (LMP Unknown)   SpO2 99%   BMI 39.18 kg/m     Estimated body mass index is 39.18 kg/m  as calculated from the following:    Height as of this encounter: 1.66 m (5' 5.35\").    Weight as of this encounter: 108 kg (238 lb).    Physical Exam  Complete 10 point ROS completed today as part of the exam and patient denies any symptoms as reviewed in HPI     Wt Readings from Last 3 Encounters:   04/02/25 108 kg (238 lb)   09/13/24 106.1 kg " (234 lb)   09/15/24 106.6 kg (235 lb)       No LMP recorded (lmp unknown). Patient is postmenopausal.    All normal as below except abnormalities include: grossly normal exam.    General is a  59 year old sitting comfortably in no apparent distress   HEENT:  TM are clear bilaterally.  Eye exams within normal   Neck: Supple without lymphadenopathy or thyromegally  CV: Regular rate and rhythm S1S2 without rubs, murmurs or gallops,   Lungs: Clear to auscultation bilaterally  Abd:  +BS, soft NT/ND,  No masses or organomegally,   Extremities: Warm, No Edema, 2+ Pedal and radial pulses bilaterally  Skin: No lesions or rashes noted  Neuro: Able to ambulate around the exam room with equal movement, strength and normal coordination of the upper and lower extremeties symmetrically  Pelvic: Deferred as pt is asymptomatic and not due for screening   Diabetic Foot exam.  Normal inspection.  Callous formation is present bilaterally.  2+ pedal pulses bilaterally.  Sensation is intact to 10g monofilament.  No skin breakdown or ulceration noted.          History summarized from1-2:checkup on toenail 2024  reviewed   Old Records-1: Outside allergies, meds, problems and immunizations were reconciled as needed from [facility]  Radiology tests reviewed-1: mammo 2024 wnl   Lab tests reviewed-1: 2024   Medicine tests reviewed-1: colonoscopy 2024 wnl      Follow-up Visit   Expected date:  Oct 02, 2025 (Approximate)      Follow Up Appointment Details:     Follow-up with whom?: Me    Follow-Up for what?: Chronic Disease f/u    Chronic Disease f/u:  Diabetes  Hypertension  Hyperlipidemia  Anxiety       How?: In Person             Follow-up Visit   Expected date:  Apr 02, 2026 (Approximate)      Follow Up Appointment Details:     Follow-up with whom?: PCP    Follow-Up for what?: Adult Preventive    How?: In Person                 Madisyn Stacy MD         Signed Electronically by: Madisyn Stacy MD

## 2025-06-11 DIAGNOSIS — I10 ESSENTIAL HYPERTENSION, BENIGN: ICD-10-CM

## 2025-06-11 RX ORDER — AMLODIPINE BESYLATE 10 MG/1
10 TABLET ORAL DAILY
Qty: 90 TABLET | Refills: 0 | Status: SHIPPED | OUTPATIENT
Start: 2025-06-11

## 2025-06-16 DIAGNOSIS — K21.9 GERD (GASTROESOPHAGEAL REFLUX DISEASE): ICD-10-CM

## 2025-06-17 RX ORDER — OMEPRAZOLE 20 MG/1
20 CAPSULE, DELAYED RELEASE ORAL DAILY
Qty: 90 CAPSULE | Refills: 2 | Status: SHIPPED | OUTPATIENT
Start: 2025-06-17

## 2025-07-19 ENCOUNTER — HEALTH MAINTENANCE LETTER (OUTPATIENT)
Age: 60
End: 2025-07-19

## 2025-09-03 ENCOUNTER — PATIENT OUTREACH (OUTPATIENT)
Dept: CARE COORDINATION | Facility: CLINIC | Age: 60
End: 2025-09-03
Payer: COMMERCIAL